# Patient Record
Sex: MALE | NOT HISPANIC OR LATINO | Employment: FULL TIME | URBAN - METROPOLITAN AREA
[De-identification: names, ages, dates, MRNs, and addresses within clinical notes are randomized per-mention and may not be internally consistent; named-entity substitution may affect disease eponyms.]

---

## 2018-01-15 NOTE — PROGRESS NOTES
Assessment    1  Encounter for preventive health examination (V70 0) (Z00 00)   2  Nocturia (788 43) (R35 1)   3  Urinary hesitancy (788 64) (R39 11)   4  Tinea cruris (110 3) (B35 6)    Plan  Health Maintenance    · Always use a seat belt and shoulder strap when riding or driving a motor vehicle ;  Status:Complete;   Done: [id number]   · Begin or continue regular aerobic exercise  Gradually work up to at least 3 sessions of 30  minutes of exercise a week ; Status:Complete;   Done: 59OBJ0734   · Brush your teeth freq1 and floss at least once a day ; Status:Complete;   Done:  [id number]   · Decreasing the stress in your life may help your condition improve ; Status:Complete;    Done: 07WHI8410   · Diets that are low in carbohydrates and high in protein are very popular for weight loss ;  Status:Complete;   Done: 81HFS0177   · Eat a low fat and low cholesterol diet ; Status:Complete;   Done: 67MGK9188   · Use a sun block product with an SPF of 15 or more ; Status:Complete;   Done:  32DIU4137   · Vitamins can help you get daily requirements that your diet may not be giving you ;  Status:Complete;   Done: 01PCR0967   · We recommend routine visits to a dentist ; Status:Complete;   Done: 57EXF8221   · Call [phone] if: You have any warning signs of skin cancer ; Status:Complete;    Done: 41HVO8920   · Call 911 if: You experience a new kind of chest pain (angina) or pressure ;  Status:Complete;   Done: 13IDJ5119   · Stop: Fluzone Intramuscular Suspension  Health Maintenance, Need for lipid screening    · (1) LIPID PANEL, FASTING; Status:Active; Requested for:31Mar2016; Health Maintenance, Screening for diabetes mellitus    · (1) COMPREHENSIVE METABOLIC PANEL; Status:Active; Requested for:31Mar2016; Health Maintenance, Screening for thyroid disorder    · (1) TSH; Status:Active;  Requested for:31Mar2016;   Nocturia, Urinary hesitancy    · *1 - [facility] Physician Referral  Consult  Status: Active Requested  for: [id number]  Care Summary provided  : Yes   · (1) PSA (SCREEN) (Dx V76 44 Screen for Prostate Cancer); Status:Active; Requested  for:31Mar2016;   Screening for thyroid disorder, Tinea cruris    · Clotrimazole 1 % External Cream; apply small amount to effected area BID for 10  days    Discussion/Summary  Impression: health maintenance visit  Currently, he eats a healthy diet and has an adequate exercise regimen  Prostate cancer screening: PSA was ordered  Colorectal cancer screening: the risks and benefits of colorectal cancer screening were discussed and colorectal cancer screening is current  Screening lab work includes glucose and lipid profile  The immunizations are up to date  He was advised to be evaluated by an optometrist and a dentist  Advice and education were given regarding nutrition, aerobic exercise, calcium supplements, vitamin D supplements, alcohol use, sunscreen use, self skin examination and seat belt use  Patient discussion: discussed with the patient  Chief Complaint  PHYSICAL      History of Present Illness  HM, Adult Male: The patient is being seen for a health maintenance evaluation  The last health maintenance visit was 2 year(s) ago  Social History: Household members include spouse  He is   Work status: working full time and occupation: [employer]  The patient has never smoked cigarettes  He reports never drinking alcohol  He has never used illicit drugs  General Health: The patient's health since the last visit is described as good  He has regular dental visits  Immunizations status: up to date  Lifestyle:  He consumes a diverse and healthy diet  He does not have any weight concerns  He exercises regularly  He does not use tobacco  He denies alcohol use  He denies drug use  Reproductive health:  the patient is sexually active  He denies erectile dysfunction  Screening: Prostate cancer screening includes no previous prostate-specific antigen testing   Testicular cancer screening includes no previous evaluation  Colorectal cancer screening includes a colonoscopy within the past ten years  Metabolic screening includes lipid profile performed 2014, glucose screening performed 2014, thyroid function test performed 2014 and no previous DEXA  Cardiovascular risk factors: family history of cardiovascular disease, but no hypertension, no diabetes, no high LDL cholesterol, no low HDL cholesterol, no stress, no obesity, no tobacco use, no illicit drug use and no sedentary lifestyle  General health risks: no elevated prostate-specific antigen, no undescended testis, no family history of prostate cancer, no previous colon polyps, no inflammatory bowel disease, no osteoporosis risk factors, no asbestos exposure, no radiation exposure and no occupational exposure to  Safety elements used: seat belt, safe driving habits, sunscreen, smoke detector and carbon monoxide detector  HPI: Patient is here for routine checkup Patient only concerns are a change in urination where he gets up 2 times per night and has a slightly decreased stream Pateint is also having a rash in his groin that is itchy and red Patient has no other issues Patient is up to date with colonoscopy      Review of Systems    Constitutional: no fever, not feeling poorly, no chills and not feeling tired  Eyes: no eye pain and no eyesight problems  ENT: no complaints of earache, no hearing loss, no nosebleeds, no nasal discharge, no sore throat, no hoarseness  Cardiovascular: no chest pain, no intermittent leg claudication, no palpitations and no extremity edema  Respiratory: No complaints of shortness of breath, no wheezing, no cough, no SOB on exertion, no orthopnea or PND  Gastrointestinal: No complaints of abdominal pain, no constipation, no nausea or vomiting, no diarrhea or bloody stools     Genitourinary: nocturia, but no dysuria and no incontinence    The patient presents with complaints of gradual onset of intermittent episodes of mild urinary hesitancy  Episodes started about 1 year ago  He is currently experiencing urinary hesitancy  Symptoms are improved by increased fluids, but not by medication, medication avoidance, urethral dilation, stress reduction and adequate rest  Symptoms are not made worse by decongestants, antihistamines, sedative/hypnotics, anti-parkinson medications, constipation and caffeine use  Symptoms are worsening  Risk Factors: no urethral stricture / stenosis, no recurrent UTIs, no STDs, no renal calculi, no pelvic fracture, no spinal cord injury and no previous bladder surgery  Pertinent Medical History: no stress urinary incontinence, no urge urinary incontinence, no renal insufficiency, no diabetes and no parkinson disease  Musculoskeletal: no arthralgias and no myalgias  Integumentary: no rashes  Neurological: No compliants of headache, no confusion, no convulsions, no numbness or tingling, no dizziness or fainting, no limb weakness, no difficulty walking  Psychiatric: no anxiety, no sleep disturbances and no depression  Active Problems    1  Colonoscopy (Fiberoptic) Screening   2  Need for influenza vaccination (V04 81) (Z23)    Past Medical History    · History of hyperglycemia (V12 29) (Z86 39)   · History of Right knee pain (719 46) (M25 561)    Surgical History    · History of Complete Colonoscopy    Family History    · Family history of dementia (V17 2) (Z81 8)    · Family history of Stroke Syndrome (V17 1)    · Family history of No Significant Family History   · Family history of No Significant Family History    · Family history of No Significant Family History   · Family history of No Significant Family History   · Family history of No Significant Family History   · Family history of No Significant Family History    Social History    · Being A Social Drinker   · Never A Smoker    Allergies    1   No Known Drug Allergies    Vitals   Recorded: 50PAQ6621 12:51PM Systolic 828   Diastolic 78   Height 5 ft 8 in   Weight 191 lb 8 oz   BMI Calculated 29 12   BSA Calculated 2 01     Physical Exam    Constitutional   General appearance: No acute distress, well appearing and well nourished  Eyes   Conjunctiva and lids: No erythema, swelling or discharge  Pupils and irises: Equal, round, reactive to light  Ears, Nose, Mouth, and Throat   External inspection of ears and nose: Normal     Otoscopic examination: Tympanic membranes translucent with normal light reflex  Canals patent without erythema  Hearing: Normal     Nasal mucosa, septum, and turbinates: Normal without edema or erythema  Lips, teeth, and gums: Normal, good dentition  Oropharynx: Normal with no erythema, edema, exudate or lesions  Neck   Neck: Supple, symmetric, trachea midline, no masses  Thyroid: Normal, no thyromegaly  Pulmonary   Respiratory effort: No increased work of breathing or signs of respiratory distress  Auscultation of lungs: Clear to auscultation  Cardiovascular   Palpation of heart: Normal PMI, no thrills  Auscultation of heart: Normal rate and rhythm, normal S1 and S2, no murmurs  Carotid pulses: 2+ bilaterally  Femoral pulses: 2+ bilaterally  Pedal pulses: 2+ bilaterally  Examination of extremities for edema and/or varicosities: Normal     Abdomen   Abdomen: Non-tender, no masses  Liver and spleen: No hepatomegaly or splenomegaly  Lymphatic   Palpation of lymph nodes in neck: No lymphadenopathy  Musculoskeletal   Gait and station: Normal     Muscle strength/tone: Normal     Skin   Skin and subcutaneous tissue: Abnormal   erythematous rash in the groin that has satellite lesions  Palpation of skin and subcutaneous tissue: Normal turgor  Neurologic   Cranial nerves: Cranial nerves 2-12 intact  Sensation: No sensory loss      Psychiatric   Judgment and insight: Normal     Orientation to person, place and time: Normal     Recent and remote memory: Intact      Mood and affect: Normal        Signatures   Electronically signed by : Michael Araya DO; Mar 31 2016  1:38PM EST                       (Author)

## 2018-01-16 NOTE — RESULT NOTES
Verified Results  (1) PSA (SCREEN) (Dx V76 44 Screen for Prostate Cancer) 02Apr2016 09:33AM Viraj Morgan   REPORT COMMENT:  FASTING:YES     Test Name Result Flag Reference   PSA, TOTAL 3 0 ng/mL  < OR = 4 0   This test was performed using the Siemens  chemiluminescent method  Values obtained from  different assay methods cannot be used  interchangeably  PSA levels, regardless of  value, should not be interpreted as absolute  evidence of the presence or absence of disease  (1) COMPREHENSIVE METABOLIC PANEL [id number] 67:07MK Viraj Morgan     Test Name Result Flag Reference   GLUCOSE 100 mg/dL H 65-99   Fasting reference interval   UREA NITROGEN (BUN) 16 mg/dL  7-25   CREATININE 1 21 mg/dL  0 70-1 33   For patients >52years of age, the reference limit  for Creatinine is approximately 13% higher for people  identified as -American  eGFR NON-AFR  AMERICAN 65 mL/min/1 73m2  > OR = 60   eGFR AFRICAN AMERICAN 75 mL/min/1 73m2  > OR = 60   BUN/CREATININE RATIO   7-10   NOT APPLICABLE (calc)   SODIUM 138 mmol/L  135-146   POTASSIUM 4 5 mmol/L  3 5-5 3   CHLORIDE 107 mmol/L     CARBON DIOXIDE 21 mmol/L  19-30   CALCIUM 8 8 mg/dL  8 6-10 3   PROTEIN, TOTAL 6 9 g/dL  6 1-8 1   ALBUMIN 3 8 g/dL  3 6-5 1   GLOBULIN 3 1 g/dL (calc)  1 9-3 7   ALBUMIN/GLOBULIN RATIO 1 2 (calc)  1 0-2 5   BILIRUBIN, TOTAL 0 6 mg/dL  0 2-1 2   ALKALINE PHOSPHATASE 51 U/L     AST 17 U/L  10-35   ALT 14 U/L  9-46     (1) LIPID PANEL, FASTING 02Apr2016 09:33AM Viraj Morgan     Test Name Result Flag Reference   CHOLESTEROL, TOTAL 173 mg/dL  125-200   HDL CHOLESTEROL 43 mg/dL  > OR = 40   TRIGLICERIDES 95 mg/dL  <615   LDL-CHOLESTEROL 111 mg/dL (calc)  <130   Desirable range <100 mg/dL for patients with CHD or  diabetes and <70 mg/dL for diabetic patients with  known heart disease     CHOL/HDLC RATIO 4 0 (calc)  < OR = 5 0   NON HDL CHOLESTEROL 130 mg/dL (calc)     Target for non-HDL cholesterol is 30 mg/dL higher than   LDL cholesterol target       (Q) TSH, 3RD GENERATION 02Apr2016 09:33AM Kofi Suazo     Test Name Result Flag Reference   TSH 1 18 mIU/L  0 40-4 50

## 2019-09-23 ENCOUNTER — OFFICE VISIT (OUTPATIENT)
Dept: FAMILY MEDICINE CLINIC | Facility: CLINIC | Age: 64
End: 2019-09-23
Payer: COMMERCIAL

## 2019-09-23 VITALS
SYSTOLIC BLOOD PRESSURE: 150 MMHG | DIASTOLIC BLOOD PRESSURE: 70 MMHG | WEIGHT: 191 LBS | BODY MASS INDEX: 30.7 KG/M2 | HEIGHT: 66 IN

## 2019-09-23 DIAGNOSIS — R35.1 NOCTURIA: ICD-10-CM

## 2019-09-23 DIAGNOSIS — E66.9 OBESITY (BMI 30.0-34.9): ICD-10-CM

## 2019-09-23 DIAGNOSIS — Z13.1 SCREENING FOR DIABETES MELLITUS: ICD-10-CM

## 2019-09-23 DIAGNOSIS — R03.0 ELEVATED BLOOD PRESSURE READING: ICD-10-CM

## 2019-09-23 DIAGNOSIS — Z13.220 SCREENING, LIPID: ICD-10-CM

## 2019-09-23 DIAGNOSIS — G47.8 UNREFRESHED BY SLEEP: ICD-10-CM

## 2019-09-23 DIAGNOSIS — Z00.00 ANNUAL PHYSICAL EXAM: Primary | ICD-10-CM

## 2019-09-23 DIAGNOSIS — Z12.5 SCREENING FOR PROSTATE CANCER: ICD-10-CM

## 2019-09-23 DIAGNOSIS — R73.01 FASTING HYPERGLYCEMIA: ICD-10-CM

## 2019-09-23 PROBLEM — E66.811 OBESITY (BMI 30.0-34.9): Status: ACTIVE | Noted: 2019-09-23

## 2019-09-23 PROCEDURE — 99396 PREV VISIT EST AGE 40-64: CPT | Performed by: FAMILY MEDICINE

## 2019-09-23 NOTE — PATIENT INSTRUCTIONS
Wellness Visit for Adults   AMBULATORY CARE:   A wellness visit  is when you see your healthcare provider to get screened for health problems  You can also get advice on how to stay healthy  Write down your questions so you remember to ask them  Ask your healthcare provider how often you should have a wellness visit  What happens at a wellness visit:  Your healthcare provider will ask about your health, and your family history of health problems  This includes high blood pressure, heart disease, and cancer  He or she will ask if you have symptoms that concern you, if you smoke, and about your mood  You may also be asked about your intake of medicines, supplements, food, and alcohol  Any of the following may be done:  · Your weight  will be checked  Your height may also be checked so your body mass index (BMI) can be calculated  Your BMI shows if you are at a healthy weight  · Your blood pressure  and heart rate will be checked  Your temperature may also be checked  · Blood and urine tests  may be done  Blood tests may be done to check your cholesterol levels  Abnormal cholesterol levels increase your risk for heart disease and stroke  You may also need a blood or urine test to check for diabetes if you are at increased risk  Urine tests may be done to look for signs of an infection or kidney disease  · A physical exam  includes checking your heartbeat and lungs with a stethoscope  Your healthcare provider may also check your skin to look for sun damage  · Screening tests  may be recommended  A screening test is done to check for diseases that may not cause symptoms  The screening tests you may need depend on your age, gender, family history, and lifestyle habits  For example, colorectal screening may be recommended if you are 48years old or older  Screening tests you need if you are a woman:   · A Pap smear  is used to screen for cervical cancer   Pap smears are usually done every 3 to 5 years depending on your age  You may need them more often if you have had abnormal Pap smear test results in the past  Ask your healthcare provider how often you should have a Pap smear  · A mammogram  is an x-ray of your breasts to screen for breast cancer  Experts recommend mammograms every 2 years starting at age 48 years  You may need a mammogram at age 52 years or younger if you have an increased risk for breast cancer  Talk to your healthcare provider about when you should start having mammograms and how often you need them  Vaccines you may need:   · Get an influenza vaccine  every year  The influenza vaccine protects you from the flu  Several types of viruses cause the flu  The viruses change over time, so new vaccines are made each year  · Get a tetanus-diphtheria (Td) booster vaccine  every 10 years  This vaccine protects you against tetanus and diphtheria  Tetanus is a severe infection that may cause painful muscle spasms and lockjaw  Diphtheria is a severe bacterial infection that causes a thick covering in the back of your mouth and throat  · Get a human papillomavirus (HPV) vaccine  if you are female and aged 23 to 32 or male 23 to 24 and never received it  This vaccine protects you from HPV infection  HPV is the most common infection spread by sexual contact  HPV may also cause vaginal, penile, and anal cancers  · Get a pneumococcal vaccine  if you are aged 72 years or older  The pneumococcal vaccine is an injection given to protect you from pneumococcal disease  Pneumococcal disease is an infection caused by pneumococcal bacteria  The infection may cause pneumonia, meningitis, or an ear infection  · Get a shingles vaccine  if you are aged 61 or older, even if you have had shingles before  The shingles vaccine is an injection to protect you from the varicella-zoster virus  This is the same virus that causes chickenpox   Shingles is a painful rash that develops in people who had chickenpox or have been exposed to the virus  How to eat healthy:  My Plate is a model for planning healthy meals  It shows the types and amounts of foods that should go on your plate  Fruits and vegetables make up about half of your plate, and grains and protein make up the other half  A serving of dairy is included on the side of your plate  The amount of calories and serving sizes you need depends on your age, gender, weight, and height  Examples of healthy foods are listed below:  · Eat a variety of vegetables  such as dark green, red, and orange vegetables  You can also include canned vegetables low in sodium (salt) and frozen vegetables without added butter or sauces  · Eat a variety of fresh fruits , canned fruit in 100% juice, frozen fruit, and dried fruit  · Include whole grains  At least half of the grains you eat should be whole grains  Examples include whole-wheat bread, wheat pasta, brown rice, and whole-grain cereals such as oatmeal     · Eat a variety of protein foods such as seafood (fish and shellfish), lean meat, and poultry without skin (turkey and chicken)  Examples of lean meats include pork leg, shoulder, or tenderloin, and beef round, sirloin, tenderloin, and extra lean ground beef  Other protein foods include eggs and egg substitutes, beans, peas, soy products, nuts, and seeds  · Choose low-fat dairy products such as skim or 1% milk or low-fat yogurt, cheese, and cottage cheese  · Limit unhealthy fats  such as butter, hard margarine, and shortening  Exercise:  Exercise at least 30 minutes per day on most days of the week  Some examples of exercise include walking, biking, dancing, and swimming  You can also fit in more physical activity by taking the stairs instead of the elevator or parking farther away from stores  Include muscle strengthening activities 2 days each week  Regular exercise provides many health benefits   It helps you manage your weight, and decreases your risk for type 2 diabetes, heart disease, stroke, and high blood pressure  Exercise can also help improve your mood  Ask your healthcare provider about the best exercise plan for you  General health and safety guidelines:   · Do not smoke  Nicotine and other chemicals in cigarettes and cigars can cause lung damage  Ask your healthcare provider for information if you currently smoke and need help to quit  E-cigarettes or smokeless tobacco still contain nicotine  Talk to your healthcare provider before you use these products  · Limit alcohol  A drink of alcohol is 12 ounces of beer, 5 ounces of wine, or 1½ ounces of liquor  · Lose weight, if needed  Being overweight increases your risk of certain health conditions  These include heart disease, high blood pressure, type 2 diabetes, and certain types of cancer  · Protect your skin  Do not sunbathe or use tanning beds  Use sunscreen with a SPF 15 or higher  Apply sunscreen at least 15 minutes before you go outside  Reapply sunscreen every 2 hours  Wear protective clothing, hats, and sunglasses when you are outside  · Drive safely  Always wear your seatbelt  Make sure everyone in your car wears a seatbelt  A seatbelt can save your life if you are in an accident  Do not use your cell phone when you are driving  This could distract you and cause an accident  Pull over if you need to make a call or send a text message  · Practice safe sex  Use latex condoms if are sexually active and have more than one partner  Your healthcare provider may recommend screening tests for sexually transmitted infections (STIs)  · Wear helmets, lifejackets, and protective gear  Always wear a helmet when you ride a bike or motorcycle, go skiing, or play sports that could cause a head injury  Wear protective equipment when you play sports  Wear a lifejacket when you are on a boat or doing water sports    © 2017 [address] [employer] Information is for End User's use only and may not be sold, redistributed or otherwise used for commercial purposes  All illustrations and images included in CareNotes® are the copyrighted property of [employer]  or Osiel Tomlinson  The above information is an  only  It is not intended as medical advice for individual conditions or treatments  Talk to your doctor, nurse or pharmacist before following any medical regimen to see if it is safe and effective for you  Obesity   AMBULATORY CARE:   Obesity  is when your body mass index (BMI) is greater than 30  Your healthcare provider will use your height and weight to measure your BMI  The risks of obesity include  many health problems, such as injuries or physical disability  You may need tests to check for the following:  · Diabetes     · High blood pressure or high cholesterol     · Heart disease     · Gallbladder or liver disease     · Cancer of the colon, breast, prostate, liver, or kidney     · Sleep apnea     · Arthritis or gout  Seek care immediately if:   · You have a severe headache, confusion, or difficulty speaking  · You have weakness on one side of your body  · You have chest pain, sweating, or shortness of breath  Contact your healthcare provider if:   · You have symptoms of gallbladder or liver disease, such as pain in your upper abdomen  · You have knee or hip pain and discomfort while walking  · You have symptoms of diabetes, such as intense hunger and thirst, and frequent urination  · You have symptoms of sleep apnea, such as snoring or daytime sleepiness  · You have questions or concerns about your condition or care  Treatment for obesity  focuses on helping you lose weight to improve your health  Even a small decrease in BMI can reduce the risk for many health problems  Your healthcare provider will help you set a weight-loss goal   · Lifestyle changes  are the first step in treating obesity   These include making healthy food choices and getting regular physical activity  Your healthcare provider may suggest a weight-loss program that involves coaching, education, and therapy  · Medicine  may help you lose weight when it is used with a healthy diet and physical activity  · Surgery  can help you lose weight if you are very obese and have other health problems  There are several types of weight-loss surgery  Ask your healthcare provider for more information  Be successful losing weight:   · Set small, realistic goals  An example of a small goal is to walk for 20 minutes 5 days a week  Anther goal is to lose 5% of your body weight  · Tell friends, family members, and coworkers about your goals  and ask for their support  Ask a friend to lose weight with you, or join a weight-loss support group  · Identify foods or triggers that may cause you to overeat , and find ways to avoid them  Remove tempting high-calorie foods from your home and workplace  Place a bowl of fresh fruit on your kitchen counter  If stress causes you to eat, then find other ways to cope with stress  · Keep a diary to track what you eat and drink  Also write down how many minutes of physical activity you do each day  Weigh yourself once a week and record it in your diary  Eating changes: You will need to eat 500 to 1,000 fewer calories each day than you currently eat to lose 1 to 2 pounds a week  The following changes will help you cut calories:  · Eat smaller portions  Use small plates, no larger than 9 inches in diameter  Fill your plate half full of fruits and vegetables  Measure your food using measuring cups until you know what a serving size looks like  · Eat 3 meals and 1 or 2 snacks each day  Plan your meals in advance  Yomaira More and eat at home most of the time  Eat slowly  · Eat fruits and vegetables at every meal   They are low in calories and high in fiber, which makes you feel full   Do not add butter, margarine, or cream sauce to vegetables  Use herbs to season steamed vegetables  · Eat less fat and fewer fried foods  Eat more baked or grilled chicken and fish  These protein sources are lower in calories and fat than red meat  Limit fast food  Dress your salads with olive oil and vinegar instead of bottled dressing  · Limit the amount of sugar you eat  Do not drink sugary beverages  Limit alcohol  Activity changes:  Physical activity is good for your body in many ways  It helps you burn calories and build strong muscles  It decreases stress and depression, and improves your mood  It can also help you sleep better  Talk to your healthcare provider before you begin an exercise program   · Exercise for at least 30 minutes 5 days a week  Start slowly  Set aside time each day for physical activity that you enjoy and that is convenient for you  It is best to do both weight training and an activity that increases your heart rate, such as walking, bicycling, or swimming  · Find ways to be more active  Do yard work and housecleaning  Walk up the stairs instead of using elevators  Spend your leisure time going to events that require walking, such as outdoor festivals or fairs  This extra physical activity can help you lose weight and keep it off  Follow up with your healthcare provider as directed: You may need to meet with a dietitian  Write down your questions so you remember to ask them during your visits  © 2017 [address] [employer] Information is for End User's use only and may not be sold, redistributed or otherwise used for commercial purposes  All illustrations and images included in CareNotes® are the copyrighted property of [employer]  or Osiel Tomlinson  The above information is an  only  It is not intended as medical advice for individual conditions or treatments   Talk to your doctor, nurse or pharmacist before following any medical regimen to see if it is safe and effective for you   Heart Healthy Diet   AMBULATORY CARE:   A heart healthy diet  is an eating plan low in total fat, unhealthy fats, and sodium (salt)  A heart healthy diet helps decrease your risk for heart disease and stroke  Limit the amount of fat you eat to 25% to 35% of your total daily calories  Limit sodium to less than 2,300 mg each day  Healthy fats:  Healthy fats can help improve cholesterol levels  The risk for heart disease is decreased when cholesterol levels are normal  Choose healthy fats, such as the following:  · Unsaturated fat  is found in foods such as soybean, canola, olive, corn, and safflower oils  It is also found in soft tub margarine that is made with liquid vegetable oil  · Omega-3 fat  is found in certain fish, such as salmon, tuna, and trout, and in walnuts and flaxseed  Unhealthy fats:  Unhealthy fats can cause unhealthy cholesterol levels in your blood and increase your risk of heart disease  Limit unhealthy fats, such as the following:  · Cholesterol  is found in animal foods, such as eggs and lobster, and in dairy products made from whole milk  Limit cholesterol to less than 300 milligrams (mg) each day  You may need to limit cholesterol to 200 mg each day if you have heart disease  · Saturated fat  is found in meats, such as henderson and hamburger  It is also found in chicken or turkey skin, whole milk, and butter  Limit saturated fat to less than 7% of your total daily calories  Limit saturated fat to less than 6% if you have heart disease or are at increased risk for it  · Trans fat  is found in packaged foods, such as potato chips and cookies  It is also in hard margarine, some fried foods, and shortening  Avoid trans fats as much as possible    Heart healthy foods and drinks to include:  Ask your dietitian or healthcare provider how many servings to have from each of the following food groups:  · Grains:      ¨ Whole-wheat breads, cereals, and pastas, and brown rice    ¨ Low-fat, low-sodium crackers and chips    · Vegetables:      ¨ Broccoli, green beans, green peas, and spinach    ¨ Collards, kale, and lima beans    ¨ Carrots, sweet potatoes, tomatoes, and peppers    ¨ Canned vegetables with no salt added    · Fruits:      ¨ Bananas, peaches, pears, and pineapple    ¨ Grapes, raisins, and dates    ¨ Oranges, tangerines, grapefruit, orange juice, and grapefruit juice    ¨ Apricots, mangoes, melons, and papaya    ¨ Raspberries and strawberries    ¨ Canned fruit with no added sugar    · Low-fat dairy products:      ¨ Nonfat (skim) milk, 1% milk, and low-fat almond, cashew, or soy milks fortified with calcium    ¨ Low-fat cheese, regular or frozen yogurt, and cottage cheese    · Meats and proteins , such as lean cuts of beef and pork (loin, leg, round), skinless chicken and turkey, legumes, soy products, egg whites, and nuts  Foods and drinks to limit or avoid:  Ask your dietitian or healthcare provider about these and other foods that are high in unhealthy fat, sodium, and sugar:  · Snack or packaged foods , such as frozen dinners, cookies, macaroni and cheese, and cereals with more than 300 mg of sodium per serving    · Canned or dry mixes  for cakes, soups, sauces, or gravies    · Vegetables with added sodium , such as instant potatoes, vegetables with added sauces, or regular canned vegetables    · Other foods high in sodium , such as ketchup, barbecue sauce, salad dressing, pickles, olives, soy sauce, and miso    · High-fat dairy foods  such as whole or 2% milk, cream cheese, or sour cream, and cheeses     · High-fat protein foods  such as high-fat cuts of beef (T-bone steaks, ribs), chicken or turkey with skin, and organ meats, such as liver    · Cured or smoked meats , such as hot dogs, henderson, and sausage    · Unhealthy fats and oils , such as butter, stick margarine, shortening, and cooking oils such as coconut or palm oil    · Food and drinks high in sugar , such as soft drinks (soda), sports drinks, sweetened tea, candy, cake, cookies, pies, and doughnuts  Other diet guidelines to follow:   · Eat more foods containing omega-3 fats  Eat fish high in omega-3 fats at least 2 times a week  · Limit alcohol  Too much alcohol can damage your heart and raise your blood pressure  Women should limit alcohol to 1 drink a day  Men should limit alcohol to 2 drinks a day  A drink of alcohol is 12 ounces of beer, 5 ounces of wine, or 1½ ounces of liquor  · Choose low-sodium foods  High-sodium foods can lead to high blood pressure  Add little or no salt to food you prepare  Use herbs and spices in place of salt  · Eat more fiber  to help lower cholesterol levels  Eat at least 5 servings of fruits and vegetables each day  Eat 3 ounces of whole-grain foods each day  Legumes (beans) are also a good source of fiber  Lifestyle guidelines:   · Do not smoke  Nicotine and other chemicals in cigarettes and cigars can cause lung and heart damage  Ask your healthcare provider for information if you currently smoke and need help to quit  E-cigarettes or smokeless tobacco still contain nicotine  Talk to your healthcare provider before you use these products  · Exercise regularly  to help you maintain a healthy weight and improve your blood pressure and cholesterol levels  Ask your healthcare provider about the best exercise plan for you  Do not start an exercise program without asking your healthcare provider  Follow up with your healthcare provider as directed:  Write down your questions so you remember to ask them during your visits  © 2017 [address] [employer] Information is for End User's use only and may not be sold, redistributed or otherwise used for commercial purposes  All illustrations and images included in CareNotes® are the copyrighted property of [employer]  or Osiel Tomlinson  The above information is an  only   It is not intended as medical advice for individual conditions or treatments  Talk to your doctor, nurse or pharmacist before following any medical regimen to see if it is safe and effective for you

## 2019-09-23 NOTE — PROGRESS NOTES
[address]    NAME: Luisa Pichardo  AGE: 62 y o  SEX: male  : 1955     DATE: 2019     Assessment and Plan:     Problem List Items Addressed This Visit        Nervous and Auditory    Unrefreshed by sleep     With the elevated BP obesity and fatigue I will check sleep study         Relevant Orders    Home Study       Other    Elevated blood pressure reading     dsicussed hear health diet no caffeine and will recheck in 6 weeks         Relevant Orders    Home Study    Nocturia     Decrease cafeeine refer to urology check PSA         Relevant Orders    Ambulatory referral to Urology    Fasting hyperglycemia     Last fasting sugar was borderline and with weight in obesity category will check A1c         Relevant Orders    Hemoglobin A1C    Screening for prostate cancer     Check PSA refer to urology         Relevant Orders    PSA, Total Screen    Ambulatory referral to Urology    Obesity (BMI 30 0-34 9)     disucssed diet and exercise at length with patient          Relevant Orders    Lipid panel    Comprehensive metabolic panel    Hemoglobin A1C    Home Study    Annual physical exam - Primary     Patient to get me copy of immunizations from his job He gets tetanus and flu there and knows he is up to date Discussed screening for prostate cancer We also discussed weight loss and exercise            Other Visit Diagnoses     Screening for diabetes mellitus        Relevant Orders    Comprehensive metabolic panel    Screening, lipid        Relevant Orders    Lipid panel          Immunizations and preventive care screenings were discussed with patient today  Appropriate education was printed on patient's after visit summary  Counseling:  Alcohol/drug use: discussed moderation in alcohol intake, the recommendations for healthy alcohol use, and avoidance of illicit drug use    Dental Health: discussed importance of regular tooth brushing, flossing, and dental visits  Sexual health: discussed sexually transmitted diseases, partner selection, use of condoms, avoidance of unintended pregnancy, and contraceptive alternatives  · Exercise: the importance of regular exercise/physical activity was discussed  Recommend exercise 3-5 times per week for at least 30 minutes  BMI Counseling: Body mass index is 30 83 kg/m²  The BMI is above normal  Nutrition recommendations include decreasing portion sizes and increasing intake of lean protein  Exercise recommendations include exercising 3-5 times per week  Return in 6 weeks (on 11/4/2019) for Recheck blood pressure  Chief Complaint:     Chief Complaint   Patient presents with    Physical Exam      History of Present Illness:     Adult Annual Physical   Patient here for a comprehensive physical exam  The patient reports no problems  Diet and Physical Activity  · Diet/Nutrition: well balanced diet  · Exercise: 3-4 times a week on average  Depression Screening  PHQ-9 Depression Screening    PHQ-9:    Frequency of the following problems over the past two weeks:            General Health  · Sleep: gets 7-8 hours of sleep on average  · Hearing: normal - bilateral   · Vision: most recent eye exam <1 year ago and wears glasses  · Dental: regular dental visits, brushes teeth three times daily and flosses teeth occasionally   Health  · Symptoms include: nocturia     Review of Systems:     Review of Systems   Constitutional: Positive for fatigue  Negative for fever and unexpected weight change  HENT: Negative for congestion, sinus pain and trouble swallowing  Eyes: Negative for discharge and visual disturbance  Respiratory: Negative for cough, chest tightness, shortness of breath and wheezing  Cardiovascular: Negative for chest pain, palpitations and leg swelling  Gastrointestinal: Negative for abdominal pain, blood in stool, constipation, diarrhea, nausea and vomiting  Genitourinary: Negative for difficulty urinating, dysuria, frequency and hematuria  Nocutira 2-3 times per night   Musculoskeletal: Negative for arthralgias, gait problem and joint swelling  Skin: Negative for rash and wound  Allergic/Immunologic: Negative for environmental allergies and food allergies  Neurological: Negative for dizziness, syncope, weakness, numbness and headaches  Hematological: Negative for adenopathy  Does not bruise/bleed easily  Psychiatric/Behavioral: Positive for sleep disturbance  Negative for confusion and decreased concentration  The patient is not nervous/anxious  Frequent wakenings restlessness unrefereshed by sleep Snores a lot per wife      Past Medical History:     Past Medical History:   Diagnosis Date    Hyperglycemia     Resolved 3/31/2016       Past Surgical History:     Past Surgical History:   Procedure Laterality Date    COLONOSCOPY      Proctitis, otherwise nml  Dr Kraft Cargo   Resolved 12/3/2008       Family History:     Family History   Problem Relation Age of Onset    Dementia Mother     Stroke Father     Hypertension Father     No Known Problems Sister     No Known Problems Daughter     No Known Problems Daughter     No Known Problems Sister     No Known Problems Sister     No Known Problems Sister       Social History:     Social History     Socioeconomic History    Marital status: /Civil Union     Spouse name: None    Number of children: None    Years of education: None    Highest education level: None   Occupational History    None   Social Needs    Financial resource strain: None    Food insecurity:     Worry: None     Inability: None    Transportation needs:     Medical: None     Non-medical: None   Tobacco Use    Smoking status: Never Smoker    Smokeless tobacco: Never Used   Substance and Sexual Activity    Alcohol use: Yes     Comment: Social     Drug use: Never    Sexual activity: Yes     Partners: Female Birth control/protection: Post-menopausal     Comment: 2 kids 35 and 32   Lifestyle    Physical activity:     Days per week: None     Minutes per session: None    Stress: None   Relationships    Social connections:     Talks on phone: None     Gets together: None     Attends [employer] service: None     Active member of club or organization: None     Attends meetings of clubs or organizations: None     Relationship status: None    Intimate partner violence:     Fear of current or ex partner: None     Emotionally abused: None     Physically abused: None     Forced sexual activity: None   Other Topics Concern    None   Social History Narrative    None      Current Medications:     No current outpatient medications on file  No current facility-administered medications for this visit  Allergies:     No Known Allergies   Physical Exam:     /70   Ht 5' 6" (1 676 m)   Wt 86 6 kg (191 lb)   BMI 30 83 kg/m²     Physical Exam   Constitutional: He is oriented to person, place, and time  He appears well-developed and well-nourished  HENT:   Head: Normocephalic and atraumatic  Right Ear: Hearing, tympanic membrane and external ear normal    Left Ear: Hearing, tympanic membrane and external ear normal    Eyes: Pupils are equal, round, and reactive to light  Conjunctivae and EOM are normal    Neck: Neck supple  No thyromegaly present  Cardiovascular: Normal rate and normal heart sounds  Pulmonary/Chest: Effort normal and breath sounds normal  He has no wheezes  He has no rales  Abdominal: Soft  Bowel sounds are normal  He exhibits no distension  There is no tenderness  Musculoskeletal: He exhibits no edema or tenderness  Lymphadenopathy:     He has no cervical adenopathy  Neurological: He is alert and oriented to person, place, and time  No cranial nerve deficit  Coordination normal    Skin: Skin is warm and dry  No rash noted  Psychiatric: He has a normal mood and affect   His behavior is normal  Judgment and thought content normal    Nursing note [place]  [id number] [address]

## 2019-09-23 NOTE — ASSESSMENT & PLAN NOTE
Patient to get me copy of immunizations from his job He gets tetanus and flu there and knows he is up to date Discussed screening for prostate cancer We also discussed weight loss and exercise

## 2019-09-23 NOTE — PROGRESS NOTES
Assessment/Plan:    No problem-specific Assessment & Plan notes found for this encounter  There are no diagnoses linked to this encounter  Subjective:   Chief Complaint   Patient presents with    Physical Exam        Patient ID: Carla Stovall is a 62 y o  male      HPI    The following portions of the patient's history were reviewed and updated as appropriate: allergies, current medications, past family history, past medical history, past social history, past surgical history and problem list     Review of Systems      Objective:      /70   Ht 5' 6" (1 676 m)   Wt 86 6 kg (191 lb)   BMI 30 83 kg/m²          Physical Exam

## 2019-10-01 LAB
ALBUMIN SERPL-MCNC: 4 G/DL (ref 3.6–5.1)
ALBUMIN/GLOB SERPL: 1.6 (CALC) (ref 1–2.5)
ALP SERPL-CCNC: 50 U/L (ref 40–115)
ALT SERPL-CCNC: 15 U/L (ref 9–46)
AST SERPL-CCNC: 16 U/L (ref 10–35)
BILIRUB SERPL-MCNC: 0.6 MG/DL (ref 0.2–1.2)
BUN SERPL-MCNC: 15 MG/DL (ref 7–25)
BUN/CREAT SERPL: 11 (CALC) (ref 6–22)
CALCIUM SERPL-MCNC: 8.8 MG/DL (ref 8.6–10.3)
CHLORIDE SERPL-SCNC: 107 MMOL/L (ref 98–110)
CHOLEST SERPL-MCNC: 155 MG/DL
CHOLEST/HDLC SERPL: 3.2 (CALC)
CO2 SERPL-SCNC: 26 MMOL/L (ref 20–32)
CREAT SERPL-MCNC: 1.33 MG/DL (ref 0.7–1.25)
GLOBULIN SER CALC-MCNC: 2.5 G/DL (CALC) (ref 1.9–3.7)
GLUCOSE SERPL-MCNC: 106 MG/DL (ref 65–99)
HBA1C MFR BLD: 5.6 % OF TOTAL HGB
HDLC SERPL-MCNC: 48 MG/DL
LDLC SERPL CALC-MCNC: 91 MG/DL (CALC)
NONHDLC SERPL-MCNC: 107 MG/DL (CALC)
POTASSIUM SERPL-SCNC: 4.2 MMOL/L (ref 3.5–5.3)
PROT SERPL-MCNC: 6.5 G/DL (ref 6.1–8.1)
PSA SERPL-MCNC: 4.5 NG/ML
SL AMB EGFR AFRICAN AMERICAN: 65 ML/MIN/1.73M2
SL AMB EGFR NON AFRICAN AMERICAN: 56 ML/MIN/1.73M2
SODIUM SERPL-SCNC: 140 MMOL/L (ref 135–146)
TRIGL SERPL-MCNC: 75 MG/DL

## 2019-10-15 ENCOUNTER — TELEPHONE (OUTPATIENT)
Dept: SLEEP CENTER | Facility: CLINIC | Age: 64
End: 2019-10-15

## 2019-10-15 NOTE — TELEPHONE ENCOUNTER
----- Message from Kendell Roberts DO sent at 10/14/2019  4:30 PM EDT -----   Chart reviewed  Study approved  Schedule HST   ----- Message -----  From: Rigoberto Daly MA  Sent: 10/14/2019   2:14 PM EDT  To: [facility] Provider    This sleep study needs approval      If approved please sign and return to clerical pool  If denied please include reasons why  Also provide alternative testing if warranted  Please sign and return to clerical pool

## 2019-10-25 ENCOUNTER — OFFICE VISIT (OUTPATIENT)
Dept: UROLOGY | Facility: MEDICAL CENTER | Age: 64
End: 2019-10-25
Payer: COMMERCIAL

## 2019-10-25 VITALS
HEART RATE: 59 BPM | DIASTOLIC BLOOD PRESSURE: 80 MMHG | SYSTOLIC BLOOD PRESSURE: 140 MMHG | HEIGHT: 66 IN | BODY MASS INDEX: 31.53 KG/M2 | WEIGHT: 196.2 LBS

## 2019-10-25 DIAGNOSIS — R35.1 NOCTURIA: ICD-10-CM

## 2019-10-25 DIAGNOSIS — N40.0 ENLARGED PROSTATE ON RECTAL EXAMINATION: ICD-10-CM

## 2019-10-25 DIAGNOSIS — R97.20 ELEVATED PSA: Primary | ICD-10-CM

## 2019-10-25 DIAGNOSIS — Z12.5 SCREENING FOR PROSTATE CANCER: ICD-10-CM

## 2019-10-25 LAB
SL AMB  POCT GLUCOSE, UA: NORMAL
SL AMB LEUKOCYTE ESTERASE,UA: NORMAL
SL AMB POCT BILIRUBIN,UA: NORMAL
SL AMB POCT BLOOD,UA: NORMAL
SL AMB POCT CLARITY,UA: CLEAR
SL AMB POCT COLOR,UA: YELLOW
SL AMB POCT KETONES,UA: NORMAL
SL AMB POCT NITRITE,UA: NORMAL
SL AMB POCT PH,UA: 6.5
SL AMB POCT SPECIFIC GRAVITY,UA: 1.02
SL AMB POCT URINE PROTEIN: NORMAL
SL AMB POCT UROBILINOGEN: 0.2

## 2019-10-25 PROCEDURE — 81003 URINALYSIS AUTO W/O SCOPE: CPT | Performed by: UROLOGY

## 2019-10-25 PROCEDURE — 99244 OFF/OP CNSLTJ NEW/EST MOD 40: CPT | Performed by: UROLOGY

## 2019-10-25 NOTE — PROGRESS NOTES
Assessment/Plan:      Diagnoses and all orders for this visit:    Elevated PSA  -     POCT urine dip auto non-scope  -     PSA, total and free; Future    Screening for prostate cancer  -     Ambulatory referral to Urology    Nocturia  -     Ambulatory referral to Urology    Enlarged prostate on rectal examination        Assessment/Plan:  Not really symptomatic enough for considering starting him on BPH meds at this point  Will repeat his PSA to ensure its truly elevated before suggesting intervention  Subjective:  Elevated PSA     Patient ID: Carlisle Siemens is a 62 y o  male  HPI  27-year-old gentleman now referred to see us for PSA elevation of 4 5, though he does not recall him having a level prior to this 1  He was seen a few years ago by our service for evaluation of urinary symptoms  He had complaints of nocturia one to 2 times per night as well as increasing urinary hesitancy  Interestingly enough, when he does take a Tylenol p m, that allows him to avoid getting up at night to void  That has not changed since then  Occasionally he feels the urge to double void  He does not have trouble emptying his bladder  He does notice a slight diminished stream       No history of kidney stones, urinary infections, family history of prostate problems  He does not take any other medications  Review of Systems   Constitutional: Negative  HENT: Negative  Eyes: Negative  Respiratory: Negative  Cardiovascular: Negative  Gastrointestinal: Negative  Endocrine: Negative  Genitourinary: Negative  Nocturia   Musculoskeletal: Negative  Skin: Negative  Allergic/Immunologic: Negative  Neurological: Negative  Hematological: Negative  Psychiatric/Behavioral: Negative  Objective:     Physical Exam   Constitutional: He is oriented to person, place, and time  He appears well-developed and well-nourished  No distress  HENT:   Head: Normocephalic and atraumatic     Nose: Nose normal    Mouth/Throat: Oropharynx is clear and moist    Eyes: Pupils are equal, round, and reactive to light  Conjunctivae and EOM are normal  No scleral icterus  Neck: Normal range of motion  Neck supple  Cardiovascular: Normal rate, regular rhythm, normal heart sounds and intact distal pulses  No murmur heard  Pulmonary/Chest: Effort normal and breath sounds normal  No respiratory distress  He has no wheezes  He has no rales  Abdominal: Soft  Bowel sounds are normal  He exhibits no distension and no mass  There is no tenderness  Musculoskeletal: Normal range of motion  He exhibits no edema or tenderness  Lymphadenopathy:     He has no cervical adenopathy  Neurological: He is alert and oriented to person, place, and time  No cranial nerve deficit  Skin: Skin is warm and dry  No rash noted  No erythema  No pallor  Psychiatric: He has a normal mood and affect  His behavior is normal  Judgment and thought content normal    Nursing note and vitals reviewed        PSA, Total Screen    Ref Range & Units 9/30/19  7:46 AM   Prostate Specific Antigen Total < OR = 4 0 ng/mL 4 5High

## 2019-11-13 ENCOUNTER — HOSPITAL ENCOUNTER (OUTPATIENT)
Dept: SLEEP CENTER | Facility: CLINIC | Age: 64
Discharge: HOME | End: 2019-11-13
Payer: COMMERCIAL

## 2019-11-13 DIAGNOSIS — G47.8 UNREFRESHED BY SLEEP: ICD-10-CM

## 2019-11-13 DIAGNOSIS — R03.0 ELEVATED BLOOD PRESSURE READING: ICD-10-CM

## 2019-11-13 DIAGNOSIS — E66.9 OBESITY (BMI 30.0-34.9): ICD-10-CM

## 2019-11-13 PROCEDURE — G0399 HOME SLEEP TEST/TYPE 3 PORTA: HCPCS

## 2019-11-14 NOTE — PROGRESS NOTES
Home Sleep Study Documentation    Pre-Sleep Home Study:    Set-up and instructions performed by: SANDEEP [facility]    Technician performed demonstration for Patient: yes    Return demonstration performed by Patient: yes    Written instructions provided to Patient: yes    Patient signed consent form: yes        Post-Sleep Home Study:    Additional comments by Patient: None    Home Sleep Study Failed:no:    Failure reason: N/A    Reported or Detected: N/A    Scored by: JOSÉ MIGUEL Cross

## 2019-11-15 DIAGNOSIS — G47.33 OBSTRUCTIVE SLEEP APNEA SYNDROME: Primary | ICD-10-CM

## 2019-11-15 DIAGNOSIS — G47.8 UNREFRESHED BY SLEEP: ICD-10-CM

## 2019-11-22 ENCOUNTER — OFFICE VISIT (OUTPATIENT)
Dept: UROLOGY | Facility: MEDICAL CENTER | Age: 64
End: 2019-11-22
Payer: COMMERCIAL

## 2019-11-22 VITALS
DIASTOLIC BLOOD PRESSURE: 88 MMHG | SYSTOLIC BLOOD PRESSURE: 158 MMHG | WEIGHT: 191 LBS | BODY MASS INDEX: 29.98 KG/M2 | HEIGHT: 67 IN | HEART RATE: 59 BPM

## 2019-11-22 DIAGNOSIS — IMO0001: Primary | ICD-10-CM

## 2019-11-22 DIAGNOSIS — R35.1 NOCTURIA: ICD-10-CM

## 2019-11-22 DIAGNOSIS — N40.0 ENLARGED PROSTATE ON RECTAL EXAMINATION: ICD-10-CM

## 2019-11-22 LAB
SL AMB  POCT GLUCOSE, UA: NORMAL
SL AMB LEUKOCYTE ESTERASE,UA: NORMAL
SL AMB POCT BILIRUBIN,UA: NORMAL
SL AMB POCT BLOOD,UA: NORMAL
SL AMB POCT CLARITY,UA: CLEAR
SL AMB POCT COLOR,UA: YELLOW
SL AMB POCT KETONES,UA: NORMAL
SL AMB POCT NITRITE,UA: NORMAL
SL AMB POCT PH,UA: 7
SL AMB POCT SPECIFIC GRAVITY,UA: 1.02
SL AMB POCT URINE PROTEIN: NORMAL
SL AMB POCT UROBILINOGEN: 0.2

## 2019-11-22 PROCEDURE — 81003 URINALYSIS AUTO W/O SCOPE: CPT | Performed by: UROLOGY

## 2019-11-22 PROCEDURE — 99213 OFFICE O/P EST LOW 20 MIN: CPT | Performed by: UROLOGY

## 2019-11-22 NOTE — PROGRESS NOTES
Assessment/Plan:      Diagnoses and all orders for this visit:    Normal prostate specific antigen (PSA) level  -     PSA, total and free; Future    Enlarged prostate on rectal examination  -     PSA, total and free; Future    Nocturia  -     POCT urine dip auto non-scope        Assessment/Plan:  Repeat PSA normalized  Follow-up and PSA in 6 months  Subjective:  No complaints     Patient ID: Sheeba Sykes is a 62 y o  male  HPI  29-year-old gentleman was initially referred to see us for PSA elevation of 4 5, though he did not recall him having a level prior  Leidy Richards He was seen a few years ago by our service for evaluation of urinary symptoms  He had complaints of nocturia one to 2 times per night as well as increasing urinary hesitancy  Interestingly enough, when he does take a Tylenol p m, that allows him to avoid getting up at night to void  That has not changed since then  Occasionally he feels the urge to double void  He does not have trouble emptying his bladder  He does notice a slight diminished stream      Repeat PSA earlier this month with 3 3      No history of kidney stones, urinary infections, family history of prostate problems  He does not take any other medications  Review of Systems   Constitutional: Negative  HENT: Negative  Eyes: Negative  Respiratory: Negative  Cardiovascular: Negative  Gastrointestinal: Negative  Endocrine: Negative  Genitourinary: Negative  Musculoskeletal: Negative  Skin: Negative  Allergic/Immunologic: Negative  Neurological: Negative  Hematological: Negative  Psychiatric/Behavioral: Negative  Objective:     Physical Exam   Constitutional: He is oriented to person, place, and time  He appears well-developed and well-nourished  No distress  HENT:   Head: Normocephalic and atraumatic  Nose: Nose normal    Mouth/Throat: Oropharynx is clear and moist    Eyes: Pupils are equal, round, and reactive to light   Conjunctivae and EOM are normal  No scleral icterus  Neck: Normal range of motion  Neck supple  Cardiovascular: Normal rate, regular rhythm, normal heart sounds and intact distal pulses  No murmur heard  Pulmonary/Chest: Effort normal and breath sounds normal  No respiratory distress  He has no wheezes  He has no rales  Abdominal: Soft  Bowel sounds are normal  He exhibits no distension and no mass  There is no tenderness  Musculoskeletal: Normal range of motion  He exhibits no edema or tenderness  Lymphadenopathy:     He has no cervical adenopathy  Neurological: He is alert and oriented to person, place, and time  No cranial nerve deficit  Skin: Skin is warm and dry  No rash noted  No erythema  No pallor  Psychiatric: He has a normal mood and affect  His behavior is normal  Judgment and thought content normal    Nursing note and vitals reviewed          PSA from 11/04/2019 was 3 3, with a% free of 24%

## 2019-11-26 ENCOUNTER — OFFICE VISIT (OUTPATIENT)
Dept: FAMILY MEDICINE CLINIC | Facility: CLINIC | Age: 64
End: 2019-11-26
Payer: COMMERCIAL

## 2019-11-26 VITALS
HEIGHT: 67 IN | BODY MASS INDEX: 30.61 KG/M2 | TEMPERATURE: 98.5 F | SYSTOLIC BLOOD PRESSURE: 180 MMHG | DIASTOLIC BLOOD PRESSURE: 100 MMHG | WEIGHT: 195 LBS

## 2019-11-26 DIAGNOSIS — I10 ESSENTIAL HYPERTENSION: Primary | ICD-10-CM

## 2019-11-26 DIAGNOSIS — E66.9 OBESITY (BMI 30.0-34.9): ICD-10-CM

## 2019-11-26 PROCEDURE — 1036F TOBACCO NON-USER: CPT | Performed by: FAMILY MEDICINE

## 2019-11-26 PROCEDURE — 99213 OFFICE O/P EST LOW 20 MIN: CPT | Performed by: FAMILY MEDICINE

## 2019-11-26 RX ORDER — LISINOPRIL 5 MG/1
5 TABLET ORAL DAILY
Qty: 30 TABLET | Refills: 1 | Status: SHIPPED | OUTPATIENT
Start: 2019-11-26 | End: 2020-03-26 | Stop reason: SDUPTHER

## 2019-11-26 NOTE — PROGRESS NOTES
Assessment/Plan:    Essential hypertension  Discussed low salt diet with patient as well as wiehgt loss and stress management With family hisotry and his home BP elevated I will start medication lisinopril 5 mg daily and will have nurse BP check in 2 weeks       Diagnoses and all orders for this visit:    Essential hypertension    Obesity (BMI 30 0-34  9)          Subjective:   Chief Complaint   Patient presents with    Follow-up    Hypertension          Patient ID: Holly [facility] is a 62 y o  male  Patient is here for folwloup of hypertension His blood pressure has been elevated for last 6 months he has had BP readings at home and are between 140-150 patient has no headahce chest pain or dizziness Patient has family history of dad at age 46 stroke due to hypertenison his last labs were good He feels stress is part of issue he is planning to retire in June [place] will then pay  More attention to diet and try to exercise      The following portions of the patient's history were reviewed and updated as appropriate: allergies, current medications, past medical history, past social history and problem list     Review of Systems   Constitutional: Negative for fatigue, fever and unexpected weight change  HENT: Negative for congestion, sinus pain and trouble swallowing  Eyes: Negative for discharge and visual disturbance  Respiratory: Negative for cough, chest tightness, shortness of breath and wheezing  Cardiovascular: Negative for chest pain, palpitations and leg swelling  Gastrointestinal: Negative for abdominal pain, blood in stool, constipation, diarrhea, nausea and vomiting  Genitourinary: Negative for difficulty urinating, dysuria, frequency and hematuria  Musculoskeletal: Negative for arthralgias, gait problem and joint swelling  Skin: Negative for rash and wound  Allergic/Immunologic: Negative for environmental allergies and food allergies     Neurological: Negative for dizziness, syncope, weakness, numbness and headaches  Hematological: Negative for adenopathy  Does not bruise/bleed easily  Psychiatric/Behavioral: Negative for confusion, decreased concentration and sleep disturbance  The patient is not nervous/anxious  Objective:      BP (!) 180/100   Temp 98 5 °F (36 9 °C)   Ht 5' 7" (1 702 m)   Wt 88 5 kg (195 lb)   BMI 30 54 kg/m²          Physical Exam   Constitutional: He is oriented to person, place, and time  He appears well-developed and well-nourished  HENT:   Head: Normocephalic and atraumatic  Right Ear: External ear normal    Left Ear: External ear normal    Nose: Nose normal    Mouth/Throat: Oropharynx is clear and moist    Neck: Normal range of motion  Neck supple  Cardiovascular: Normal rate and regular rhythm  Pulmonary/Chest: Effort normal and breath sounds normal    Neurological: He is alert and oriented to person, place, and time  Skin: Skin is dry  Psychiatric: He has a normal mood and affect  His behavior is normal  Judgment and thought content normal    Nursing note and vitals reviewed

## 2019-11-26 NOTE — PATIENT INSTRUCTIONS

## 2019-11-26 NOTE — ASSESSMENT & PLAN NOTE
Discussed low salt diet with patient as well as wiehgt loss and stress management With family hisotry and his home BP elevated I will start medication lisinopril 5 mg daily and will have nurse BP check in 2 weeks

## 2019-12-10 ENCOUNTER — CLINICAL SUPPORT (OUTPATIENT)
Dept: FAMILY MEDICINE CLINIC | Facility: CLINIC | Age: 64
End: 2019-12-10

## 2019-12-10 VITALS — SYSTOLIC BLOOD PRESSURE: 130 MMHG | DIASTOLIC BLOOD PRESSURE: 84 MMHG

## 2019-12-10 DIAGNOSIS — I10 ESSENTIAL HYPERTENSION: Primary | ICD-10-CM

## 2019-12-10 NOTE — PROGRESS NOTES
Assessment/Plan:      There are no diagnoses linked to this encounter  Subjective:  Chief Complaint   Patient presents with    Hypertension          Patient ID: Fredrick Livingston is a 62 y o  male      HPI    Review of Systems      Objective:     Physical Exam

## 2020-02-25 ENCOUNTER — OFFICE VISIT (OUTPATIENT)
Dept: SLEEP CENTER | Facility: CLINIC | Age: 65
End: 2020-02-25
Payer: COMMERCIAL

## 2020-02-25 VITALS
HEART RATE: 57 BPM | BODY MASS INDEX: 30.61 KG/M2 | SYSTOLIC BLOOD PRESSURE: 132 MMHG | HEIGHT: 67 IN | WEIGHT: 195 LBS | DIASTOLIC BLOOD PRESSURE: 84 MMHG

## 2020-02-25 DIAGNOSIS — I10 ESSENTIAL HYPERTENSION: ICD-10-CM

## 2020-02-25 DIAGNOSIS — G47.8 UNREFRESHED BY SLEEP: ICD-10-CM

## 2020-02-25 DIAGNOSIS — G47.33 OBSTRUCTIVE SLEEP APNEA SYNDROME: Primary | ICD-10-CM

## 2020-02-25 DIAGNOSIS — E66.9 OBESITY (BMI 30.0-34.9): ICD-10-CM

## 2020-02-25 PROCEDURE — 99204 OFFICE O/P NEW MOD 45 MIN: CPT | Performed by: INTERNAL MEDICINE

## 2020-02-25 NOTE — PATIENT INSTRUCTIONS
What is HAROON? Obstructive sleep apnea is a common and serious sleep disorder that causes you to stop breathing during sleep  The airway repeatedly becomes blocked, limiting the amount of air that reaches your lungs  When this happens, you may snore loudly or making choking noises as you try to breathe  Your brain and body becomes oxygen deprived and you may wake up  This may happen a few times a night, or in more severe cases, several hundred times a night  Sleep apnea can make you wake up in the morning feeling tired or unrefreshed even though you have had a full night of sleep  During the day, you may feel fatigued, have difficulty concentrating or you may even unintentionally fall asleep  This is because your body is waking up numerous times throughout the night, even though you might not be conscious of each awakening  The lack of oxygen your body receives can have negative long-term consequences for your health  This includes:  High blood pressure  Heart disease  Irregular heart rhythms  Stroke  Pre-diabetes and diabetes  Depression    Testing  An objective evaluation of your sleep may be needed before your board certified sleep physician can make a diagnosis  Options include:   In-lab overnight sleep study  This type of sleep study requires you to stay overnight at a sleep center, in a bed that may resemble a hotel room  You will sleep with sensors hooked up to various parts of your body  These sensors record your brain waves, heartbeat, breathing and movement  An overnight sleep study also provides your doctor with the most complete information about your sleep  Learn more about an overnight sleep study      Home sleep apnea test  Some patients with high risk factors for obstructive sleep apnea and no other medical disorders may be candidates for a home sleep apnea test  The testing equipment differs in that it is less complicated than what is used in an overnight sleep study   As such, does not give all the data an in-lab will and if negative, may not mean you do not have the problem  Treatment for sleep apnea  includes using a continuous positive airway pressure (CPAP) machine to keep your airway open during sleep  A mask is placed over your nose and mouth, or just your nose  The mask is hooked to the CPAP machine that blows a gentle stream of air into the mask when you breathe  This helps keep your airway open so you can breathe more regularly  Extra oxygen may be given to you through the machine  You may be given a mouth device  It looks like a mouth guard or dental retainer and stops your tongue and mouth tissues from blocking your throat while you sleep  Surgery may be needed to remove extra tissues that block your mouth, throat, or nose  Manage sleep apnea:   Do not smoke  Nicotine and other chemicals in cigarettes and cigars can cause lung damage  Ask your healthcare provider for information if you currently smoke and need help to quit  E-cigarettes or smokeless tobacco still contain nicotine  Talk to your healthcare provider before you use these products  Do not drink alcohol or take sedative medicine before you go to sleep  Alcohol and sedatives can relax the muscles and tissues around your throat  This can block the airflow to your lungs  Maintain a healthy weight  Excess tissue around your throat may restrict your breathing  Ask your healthcare provider for information if you need to lose weight  Sleep on your side or use pillows designed to prevent sleep apnea  This prevents your tongue or other tissues from blocking your throat  You can also raise the head of your bed  Driving Safety  Refrain from driving when drowsy  Follow up with your healthcare provider as directed:  Write down your questions so you remember to ask them during your visits  Go to [employer] website for more information: Sleepeducation  org     What is HAROON?    Obstructive sleep apnea is a common and serious sleep disorder that causes you to stop breathing during sleep  The airway repeatedly becomes blocked, limiting the amount of air that reaches your lungs  When this happens, you may snore loudly or making choking noises as you try to breathe  Your brain and body becomes oxygen deprived and you may wake up  This may happen a few times a night, or in more severe cases, several hundred times a night  Sleep apnea can make you wake up in the morning feeling tired or unrefreshed even though you have had a full night of sleep  During the day, you may feel fatigued, have difficulty concentrating or you may even unintentionally fall asleep  This is because your body is waking up numerous times throughout the night, even though you might not be conscious of each awakening  The lack of oxygen your body receives can have negative long-term consequences for your health  This includes:  High blood pressure  Heart disease  Irregular heart rhythms  Stroke  Pre-diabetes and diabetes  Depression    Testing  An objective evaluation of your sleep may be needed before your board certified sleep physician can make a diagnosis  Options include:   In-lab overnight sleep study  This type of sleep study requires you to stay overnight at a sleep center, in a bed that may resemble a hotel room  You will sleep with sensors hooked up to various parts of your body  These sensors record your brain waves, heartbeat, breathing and movement  An overnight sleep study also provides your doctor with the most complete information about your sleep  Learn more about an overnight sleep study      Home sleep apnea test  Some patients with high risk factors for obstructive sleep apnea and no other medical disorders may be candidates for a home sleep apnea test  The testing equipment differs in that it is less complicated than what is used in an overnight sleep study   As such, does not give all the data an in-lab will and if negative, may not mean you do not have the problem  Treatment for sleep apnea  includes using a continuous positive airway pressure (CPAP) machine to keep your airway open during sleep  A mask is placed over your nose and mouth, or just your nose  The mask is hooked to the CPAP machine that blows a gentle stream of air into the mask when you breathe  This helps keep your airway open so you can breathe more regularly  Extra oxygen may be given to you through the machine  You may be given a mouth device  It looks like a mouth guard or dental retainer and stops your tongue and mouth tissues from blocking your throat while you sleep  Surgery may be needed to remove extra tissues that block your mouth, throat, or nose  Manage sleep apnea:   Do not smoke  Nicotine and other chemicals in cigarettes and cigars can cause lung damage  Ask your healthcare provider for information if you currently smoke and need help to quit  E-cigarettes or smokeless tobacco still contain nicotine  Talk to your healthcare provider before you use these products  Do not drink alcohol or take sedative medicine before you go to sleep  Alcohol and sedatives can relax the muscles and tissues around your throat  This can block the airflow to your lungs  Maintain a healthy weight  Excess tissue around your throat may restrict your breathing  Ask your healthcare provider for information if you need to lose weight  Sleep on your side or use pillows designed to prevent sleep apnea  This prevents your tongue or other tissues from blocking your throat  You can also raise the head of your bed  Driving Safety  Refrain from driving when drowsy  Follow up with your healthcare provider as directed:  Write down your questions so you remember to ask them during your visits  Go to AAS website for more information: Sleepeducation  org       Nursing Support:  When: Monday through Friday 7A-5PM except holidays  Where: Our direct line is [phone]      If you are having a true emergency please call 911  In the event that the line is busy or it is after hours please leave a voice message and we will return your call  Please speak clearly, leaving your full name, birth date, best number to reach you and the reason for your call  Medication refills: We will need the name of the medication, the dosage, the ordering provider, whether you get a 30 or 90 day refill, and the pharmacy name and address  Medications will be ordered by the provider only  Nurses cannot call in prescriptions  Please allow 7 days for medication refills  Physician requested updates: If your provider requested that you call with an update after starting medication, please be ready to provide us the medication and dosage, what time you take your medication, the time you attempt to fall asleep, time you fall asleep, when you wake up, and what time you get out of bed  Sleep Study Results: We will contact you with sleep study results and/or next steps after the physician has reviewed your testing

## 2020-02-25 NOTE — PROGRESS NOTES
Consultation - [address]  [facility]  62 y o  male  [id number]  GYX:[id number]    Physician Requesting Consult: Billy Faust DO     Reason for Consult : At your kind request I saw this patient for initial sleep evaluation today  Home sleep testing was undertaken to evaluate for sleep disordered breathing and patient is here to review results and further options  The study demonstrated : JARROD (respiratory event index of) 24 /hour  Minimum oxygen saturation was 74%  3 1% of the study was spent with saturations less than 90%  The snore index was 20 4%  PFSH, Problem List, Medications & Allergies were reviewed in EMR  He  has a past medical history of Hyperglycemia and Hypertension  He has a current medication list which includes the following prescription(s): lisinopril  HPI:  Study was undertaken because of his wife's complaints of loud snoring and witnessed apneas of several years duration  He is not aware of snoring or breathing difficulties during sleep  Symptoms are worse when he is extremely tired  Other Complaints: none  Restless Leg Syndrome: reports no suggestive symptoms    Parasomnia: no features reported    Sleep Routine:   Typical Bedtime:  Midnight Gets OOB:  7:00 a m  TIB:7 hrs  Sleep latency:< 15 minutes Sleep Interruptions:1-2/nite because of nocturia and is able to fall back asleep  Awakens: spontaneously  Upon awakening: usually feels refreshed  He denied Excessive Daytime Sleepiness but tends to doze off unintentionally after meals, even breakfast  [place] Sleepiness Scale rated at Total score: 2 /24  Habits: reports that he has never smoked  He has never used smokeless tobacco  , reports that he drinks alcohol  ,  reports that he does not use drugs  ,Caffeine use:limited , Exercise routine: regular    Family History: Mother had excessive daytime sleepiness  ROS: reviewed & as attached  Significant for weight has been stable    He reports no nasal, respiratory, cardiac or gastrointestinal symptoms  EXAM:  /84   Pulse 57   Ht 5' 7" (1 702 m)   Wt 88 5 kg (195 lb)   BMI 30 54 kg/m²    General: Well groomed male, well appearing, in no apparent distress  Psychiatric: Alert and orientated; Cooperative; Speech:clear and coherent; Normal mood, affect & thought   HEENT:  Craniofacial anatomy: normal Sinuses: non- tender  TMJ: Normal    Eyes: EOM's intact;  conjunctiva/corneas clear   Ears: Externallyappear normal     Nasal Airway: is patent Septum:intact; Mucosa: normal; Turbinates: normal; Rhinorrhea: None   Mouth: Lips: normal posture; Dentition: irregular  Mucosa:moist  ; Hard Palate:narrow and high arched   Oropharryx: crowded and AP narrowing Tongue: Mallampati:Class IV and MobileSoft Palate:  redundant  Tonsils: no hypertrophy  Neck: is thick; Neck Circumference: 17 "; Supple; no abnormal masses; Thyroid:normal  Trachea:central     Lymph: No Cervical or Submandibular Lymhadenopathy  Heart: S1,S2 normal; RRR; no gallop; nomurmurs   Lungs: Respiratory Effort:normal  Air entry good bilaterally  No wheezes  No rales  Abdomen: Obese, Soft & non-tender    Extremities: No pedal edema  No clubbing or cyanosis  Skin: warm and dry; Color& Hydration good; no facial rashes or lesions   Neurological: CNII-XII intact; Motor normal; Sensation normal  Musculoskeletal: Muscle bulk, tone and power WNL Gait:normal        IMPRESSION: Primary, Secondary Sleep Diagnoses (to Medical or Psych conditions) & Comorbidities   1  Obstructive sleep apnea syndrome  Ambulatory referral to Sleep Medicine   2  Unrefreshed by sleep  Ambulatory referral to Sleep Medicine   3  Essential hypertension     4  Obesity (BMI 30 0-34  9)        PLAN:  1  I reviewed results of the Sleep study with the patient      2  With respect to above conditions, I counseled on pathophysiology, diagnosis, treatment options, risks and benefits; inter-relationship and effects on symptoms and comorbidities; risks of no treatment; costs and insurance aspects  3 Patient elected positive airway pressure therapy and is to be scheduled for a titration study  4   I also advised on weight reduction  5  Follow-up to be scheduled after the study to review results and to initiate therapy           Sincerely,     Authenticated electronically by Renee Germain MD   on 93/86/81   Board Certified Specialist

## 2020-03-26 ENCOUNTER — OFFICE VISIT (OUTPATIENT)
Dept: FAMILY MEDICINE CLINIC | Facility: CLINIC | Age: 65
End: 2020-03-26
Payer: COMMERCIAL

## 2020-03-26 ENCOUNTER — TELEPHONE (OUTPATIENT)
Dept: OTHER | Facility: OTHER | Age: 65
End: 2020-03-26

## 2020-03-26 VITALS
WEIGHT: 195.8 LBS | DIASTOLIC BLOOD PRESSURE: 92 MMHG | HEIGHT: 67 IN | SYSTOLIC BLOOD PRESSURE: 140 MMHG | BODY MASS INDEX: 30.73 KG/M2

## 2020-03-26 DIAGNOSIS — I10 ESSENTIAL HYPERTENSION: Primary | ICD-10-CM

## 2020-03-26 DIAGNOSIS — G47.33 OBSTRUCTIVE SLEEP APNEA SYNDROME: ICD-10-CM

## 2020-03-26 DIAGNOSIS — E66.9 OBESITY (BMI 30.0-34.9): ICD-10-CM

## 2020-03-26 DIAGNOSIS — Z11.59 ENCOUNTER FOR HEPATITIS C SCREENING TEST FOR LOW RISK PATIENT: ICD-10-CM

## 2020-03-26 PROCEDURE — 3008F BODY MASS INDEX DOCD: CPT | Performed by: FAMILY MEDICINE

## 2020-03-26 PROCEDURE — 1036F TOBACCO NON-USER: CPT | Performed by: FAMILY MEDICINE

## 2020-03-26 PROCEDURE — 99214 OFFICE O/P EST MOD 30 MIN: CPT | Performed by: FAMILY MEDICINE

## 2020-03-26 PROCEDURE — 3077F SYST BP >= 140 MM HG: CPT | Performed by: FAMILY MEDICINE

## 2020-03-26 PROCEDURE — 3080F DIAST BP >= 90 MM HG: CPT | Performed by: FAMILY MEDICINE

## 2020-03-26 RX ORDER — LISINOPRIL 5 MG/1
5 TABLET ORAL DAILY
Qty: 90 TABLET | Refills: 1 | Status: SHIPPED | OUTPATIENT
Start: 2020-03-26 | End: 2020-09-21

## 2020-03-26 NOTE — PATIENT INSTRUCTIONS

## 2020-03-26 NOTE — PROGRESS NOTES
Assessment/Plan:    Obstructive sleep apnea syndrome  Patient for CPAP titration study in May Patient will continue current care     Obesity (BMI 30 0-34  9)  Patient needs to watch diet and try to exercise more     Essential hypertension  Blood pressure continue with current care and follwoup in 3months       Diagnoses and all orders for this visit:    Essential hypertension  -     lisinopril (ZESTRIL) 5 mg tablet; Take 1 tablet (5 mg total) by mouth daily  -     Comprehensive metabolic panel; Future    Obesity (BMI 30 0-34 9)  -     Comprehensive metabolic panel; Future    Obstructive sleep apnea syndrome    Encounter for hepatitis C screening test for low risk patient  -     Hepatitis C antibody; Future          Subjective:   Chief Complaint   Patient presents with    Hypertension     med refill  pt hasnt taken med since Jan           Patient ID: Pricilla Downey is a 62 y o  male  Patient is here for followup of hypertension obesity and sleep apnea Patient stopped the medication in January when he ran out Patient blood pressure within one week started to go up to 140-150 Patient has sleep apnea He has appt for titration study in May patient is trying to watch his diet last labs were in 11/2019 Patient has no new concerns at this time       The following portions of the patient's history were reviewed and updated as appropriate: allergies, current medications, past medical history, past social history and problem list     Review of Systems   Constitutional: Negative for fatigue, fever and unexpected weight change  HENT: Negative for congestion, sinus pain and trouble swallowing  Eyes: Negative for discharge and visual disturbance  Respiratory: Negative for cough, chest tightness, shortness of breath and wheezing  Cardiovascular: Negative for chest pain, palpitations and leg swelling  Gastrointestinal: Negative for abdominal pain, blood in stool, constipation, diarrhea, nausea and vomiting  Genitourinary: Negative for difficulty urinating, dysuria, frequency and hematuria  Musculoskeletal: Negative for arthralgias, gait problem and joint swelling  Skin: Negative for rash and wound  Allergic/Immunologic: Negative for environmental allergies and food allergies  Neurological: Negative for dizziness, syncope, weakness, numbness and headaches  Hematological: Negative for adenopathy  Does not bruise/bleed easily  Psychiatric/Behavioral: Negative for confusion, decreased concentration and sleep disturbance  The patient is not nervous/anxious  Objective:      /92   Ht 5' 7" (1 702 m)   Wt 88 8 kg (195 lb 12 8 oz)   BMI 30 67 kg/m²          Physical Exam   Constitutional: He is oriented to person, place, and time  He appears well-developed and well-nourished  HENT:   Head: Normocephalic and atraumatic  Right Ear: Hearing, tympanic membrane and external ear normal    Left Ear: Hearing, tympanic membrane and external ear normal    Eyes: Pupils are equal, round, and reactive to light  Conjunctivae and EOM are normal    Neck: Neck supple  No thyromegaly present  Cardiovascular: Normal rate and normal heart sounds  Pulmonary/Chest: Effort normal and breath sounds normal  He has no wheezes  He has no rales  Abdominal: Soft  Bowel sounds are normal  He exhibits no distension  There is no tenderness  Musculoskeletal: He exhibits no edema or tenderness  Lymphadenopathy:     He has no cervical adenopathy  Neurological: He is alert and oriented to person, place, and time  No cranial nerve deficit  Coordination normal    Skin: Skin is warm and dry  No rash noted  Psychiatric: He has a normal mood and affect  His behavior is normal  Judgment and thought content normal    Nursing note and vitals reviewed

## 2020-05-01 LAB
ALBUMIN SERPL-MCNC: 4 G/DL (ref 3.6–5.1)
ALBUMIN/GLOB SERPL: 1.4 (CALC) (ref 1–2.5)
ALP SERPL-CCNC: 55 U/L (ref 35–144)
ALT SERPL-CCNC: 22 U/L (ref 9–46)
AST SERPL-CCNC: 17 U/L (ref 10–35)
BILIRUB SERPL-MCNC: 0.6 MG/DL (ref 0.2–1.2)
BUN SERPL-MCNC: 18 MG/DL (ref 7–25)
BUN/CREAT SERPL: 13 (CALC) (ref 6–22)
CALCIUM SERPL-MCNC: 9.2 MG/DL (ref 8.6–10.3)
CHLORIDE SERPL-SCNC: 107 MMOL/L (ref 98–110)
CO2 SERPL-SCNC: 30 MMOL/L (ref 20–32)
CREAT SERPL-MCNC: 1.35 MG/DL (ref 0.7–1.25)
GLOBULIN SER CALC-MCNC: 2.9 G/DL (CALC) (ref 1.9–3.7)
GLUCOSE SERPL-MCNC: 98 MG/DL (ref 65–99)
HCV AB S/CO SERPL IA: 0.03
HCV AB SERPL QL IA: NORMAL
POTASSIUM SERPL-SCNC: 4.8 MMOL/L (ref 3.5–5.3)
PROT SERPL-MCNC: 6.9 G/DL (ref 6.1–8.1)
SL AMB EGFR AFRICAN AMERICAN: 64 ML/MIN/1.73M2
SL AMB EGFR NON AFRICAN AMERICAN: 55 ML/MIN/1.73M2
SODIUM SERPL-SCNC: 143 MMOL/L (ref 135–146)

## 2020-05-05 ENCOUNTER — TELEPHONE (OUTPATIENT)
Dept: SLEEP CENTER | Facility: CLINIC | Age: 65
End: 2020-05-05

## 2020-05-07 ENCOUNTER — TELEPHONE (OUTPATIENT)
Dept: SLEEP CENTER | Facility: CLINIC | Age: 65
End: 2020-05-07

## 2020-05-07 DIAGNOSIS — G47.33 OSA (OBSTRUCTIVE SLEEP APNEA): Primary | ICD-10-CM

## 2020-05-21 ENCOUNTER — TELEPHONE (OUTPATIENT)
Dept: SLEEP CENTER | Facility: CLINIC | Age: 65
End: 2020-05-21

## 2020-06-01 ENCOUNTER — TELEPHONE (OUTPATIENT)
Dept: SLEEP CENTER | Facility: CLINIC | Age: 65
End: 2020-06-01

## 2020-06-26 ENCOUNTER — OFFICE VISIT (OUTPATIENT)
Dept: FAMILY MEDICINE CLINIC | Facility: CLINIC | Age: 65
End: 2020-06-26
Payer: COMMERCIAL

## 2020-06-26 VITALS
SYSTOLIC BLOOD PRESSURE: 140 MMHG | BODY MASS INDEX: 29.66 KG/M2 | TEMPERATURE: 98.2 F | HEIGHT: 67 IN | DIASTOLIC BLOOD PRESSURE: 84 MMHG | WEIGHT: 189 LBS

## 2020-06-26 DIAGNOSIS — I10 ESSENTIAL HYPERTENSION: Primary | ICD-10-CM

## 2020-06-26 DIAGNOSIS — E66.3 OVERWEIGHT: ICD-10-CM

## 2020-06-26 DIAGNOSIS — G47.33 OBSTRUCTIVE SLEEP APNEA SYNDROME: ICD-10-CM

## 2020-06-26 DIAGNOSIS — R35.1 NOCTURIA: ICD-10-CM

## 2020-06-26 PROBLEM — R73.01 FASTING HYPERGLYCEMIA: Status: RESOLVED | Noted: 2019-09-23 | Resolved: 2020-06-26

## 2020-06-26 PROCEDURE — 3008F BODY MASS INDEX DOCD: CPT | Performed by: FAMILY MEDICINE

## 2020-06-26 PROCEDURE — 3079F DIAST BP 80-89 MM HG: CPT | Performed by: FAMILY MEDICINE

## 2020-06-26 PROCEDURE — 3077F SYST BP >= 140 MM HG: CPT | Performed by: FAMILY MEDICINE

## 2020-06-26 PROCEDURE — 99214 OFFICE O/P EST MOD 30 MIN: CPT | Performed by: FAMILY MEDICINE

## 2020-06-26 PROCEDURE — 1036F TOBACCO NON-USER: CPT | Performed by: FAMILY MEDICINE

## 2020-07-02 ENCOUNTER — TRANSCRIBE ORDERS (OUTPATIENT)
Dept: FAMILY MEDICINE CLINIC | Facility: CLINIC | Age: 65
End: 2020-07-02

## 2020-07-02 ENCOUNTER — TELEPHONE (OUTPATIENT)
Dept: FAMILY MEDICINE CLINIC | Facility: CLINIC | Age: 65
End: 2020-07-02

## 2020-07-02 DIAGNOSIS — Z23 NEED FOR SHINGLES VACCINE: Primary | ICD-10-CM

## 2020-07-24 ENCOUNTER — OFFICE VISIT (OUTPATIENT)
Dept: SLEEP CENTER | Facility: CLINIC | Age: 65
End: 2020-07-24
Payer: COMMERCIAL

## 2020-07-24 ENCOUNTER — TELEPHONE (OUTPATIENT)
Dept: SLEEP CENTER | Facility: CLINIC | Age: 65
End: 2020-07-24

## 2020-07-24 VITALS
SYSTOLIC BLOOD PRESSURE: 120 MMHG | HEART RATE: 55 BPM | WEIGHT: 185.2 LBS | HEIGHT: 67 IN | DIASTOLIC BLOOD PRESSURE: 64 MMHG | OXYGEN SATURATION: 96 % | BODY MASS INDEX: 29.07 KG/M2

## 2020-07-24 DIAGNOSIS — I10 ESSENTIAL HYPERTENSION: ICD-10-CM

## 2020-07-24 DIAGNOSIS — E66.3 OVERWEIGHT WITH BODY MASS INDEX (BMI) OF 29 TO 29.9 IN ADULT: ICD-10-CM

## 2020-07-24 DIAGNOSIS — G47.33 OSA (OBSTRUCTIVE SLEEP APNEA): Primary | ICD-10-CM

## 2020-07-24 DIAGNOSIS — R40.0 DAYTIME SLEEPINESS: ICD-10-CM

## 2020-07-24 PROCEDURE — 1036F TOBACCO NON-USER: CPT | Performed by: INTERNAL MEDICINE

## 2020-07-24 PROCEDURE — 99214 OFFICE O/P EST MOD 30 MIN: CPT | Performed by: INTERNAL MEDICINE

## 2020-07-24 PROCEDURE — 3008F BODY MASS INDEX DOCD: CPT | Performed by: INTERNAL MEDICINE

## 2020-07-24 PROCEDURE — 3078F DIAST BP <80 MM HG: CPT | Performed by: INTERNAL MEDICINE

## 2020-07-24 PROCEDURE — 3074F SYST BP LT 130 MM HG: CPT | Performed by: INTERNAL MEDICINE

## 2020-07-24 NOTE — PATIENT INSTRUCTIONS

## 2020-07-24 NOTE — PROGRESS NOTES
Follow-Up Note - Sleep Center   Shira Pichardo  60 y o  male  OGL:[id number]  EBD:[id number]    CC: I saw this patient for follow-up in clinic today for Sleep disordered breathing, Coexisting Sleep and Medical Problems  Results of home sleep testing in November of 2019 demonstrated JARROD (respiratory event index of) 24 /hour  Minimum oxygen saturation was 74%  3 1% of the study was spent with saturations less than 90%  The snore index was 20 4%  Insurance declined a titration study and auto titrating Pap was initiated  PFSH, Problem List, Medications & Allergies were reviewed in EMR  Interval changes: none reported  He  has a past medical history of Hyperglycemia and Hypertension  He has a current medication list which includes the following prescription(s): lisinopril  ROS: constitutional, psychiatric, ENT, respiratory,CVS, GI, UGS, CNS, MSK, integumentary, endocrine, hematological reviewed  He has had some weight reduction  DATA REVIEWED:  using PAP > 4 hours/night 93% of the time  Estimated JARROD 20 3/hour at pressure of 17 7cm H2O @90th percentile  In the past week, he notes respiratory event index has decreased somewhat since he has taken to sleeping on his side  SUBJECTIVE: Regarding use of PAP, Yunier reports:   · He is experiencing some adverse effects: dry mouth/throat  · He is   benefiting from use: Unsure  Wife notes Singh Salgado still snores when he puts the mask on initially    Sleep Routine: He reports getting less sleep than before at 6 5 hrs sleep  ; he has no difficulty initiating or maintaining sleep   He awakens spontaneously and feels refreshed  He somewhat improved excessive drowsiness but is still dozing off in the afternoons according to his wife  He rated himself at 1 /24 on the [place] sleepiness scale  Habits: reports that he has never smoked  He has never used smokeless tobacco ,  reports that he drinks alcohol ,  reports that he does not use drugs  , Caffeine use: limited , Exercise routine: regular    EXAM: /64   Pulse 55   Ht 5' 7" (1 702 m)   Wt 84 kg (185 lb 3 2 oz)   SpO2 96%   BMI 29 01 kg/m²     Patient is well groomed; well appearing  Skin/Extrem: warm & dry; col & hydration normal; no edema  Psych: cooperativeand in no distress  Mental state anxious  CNS: Alert, orientated, clear & coherent speech  H&N: EOMI; NC/AT:no facial pressure marks, no rashes  Neck Circumference: 15 5 cm  ENMT Mucus membranes appear normal Nasal airway:patent  Oral airway:  crowded  Resp:effort is normal CVS: RRR ABD:truncal obesity MSK:Gait normal     IMPRESSION: Primary Sleep/Secondary(to Medical or Psych conditions) & comorbidities   1  HAROON (obstructive sleep apnea)  PAP DME Pressure Change    PAP DME Resupply/Reorder   2  Daytime sleepiness     3  Essential hypertension     4  Overweight with body mass index (BMI) of 29 to 29 9 in adult         PLAN:  1  I reviewed results of the Sleep studies with the patient  2  I discussed options and answered all of their questions  3  Treatment with  PAP is medically necessary and Meenu Pierson is agreable to continue use  4  Care of equipment, methods to improve comfort using PAP and importance of compliance with therapy were discussed  5  Pressure setting: change 14-20 cmH2O  I recommended a titration study but he declined  He plans to adjust his sleep position and will monitor his progress  6  Rx provided to replace supplies and Care coordinated with DME provider  7  Strategies for weight reduction were discussed  8  Follow-up is advised in 2 months or sooner if needed to monitor progress, compliance and to adjust therapy  Thank you for allowing me to participate in the care of this patient      Sincerely,    Authenticated electronically by Yesenia Larsen MD on 66/28/46   Board Certified Specialist

## 2020-07-27 ENCOUNTER — TELEPHONE (OUTPATIENT)
Dept: SLEEP CENTER | Facility: CLINIC | Age: 65
End: 2020-07-27

## 2020-09-20 DIAGNOSIS — I10 ESSENTIAL HYPERTENSION: ICD-10-CM

## 2020-09-21 RX ORDER — LISINOPRIL 5 MG/1
TABLET ORAL
Qty: 90 TABLET | Refills: 1 | Status: SHIPPED | OUTPATIENT
Start: 2020-09-21 | End: 2021-03-09 | Stop reason: SDUPTHER

## 2020-09-25 ENCOUNTER — OFFICE VISIT (OUTPATIENT)
Dept: SLEEP CENTER | Facility: CLINIC | Age: 65
End: 2020-09-25
Payer: COMMERCIAL

## 2020-09-25 VITALS
SYSTOLIC BLOOD PRESSURE: 122 MMHG | OXYGEN SATURATION: 97 % | HEIGHT: 67 IN | WEIGHT: 182.2 LBS | DIASTOLIC BLOOD PRESSURE: 70 MMHG | BODY MASS INDEX: 28.6 KG/M2 | HEART RATE: 52 BPM

## 2020-09-25 DIAGNOSIS — E66.3 OVERWEIGHT WITH BODY MASS INDEX (BMI) OF 29 TO 29.9 IN ADULT: ICD-10-CM

## 2020-09-25 DIAGNOSIS — I10 ESSENTIAL HYPERTENSION: ICD-10-CM

## 2020-09-25 DIAGNOSIS — G47.33 OSA (OBSTRUCTIVE SLEEP APNEA): Primary | ICD-10-CM

## 2020-09-25 DIAGNOSIS — R40.0 DAYTIME SLEEPINESS: ICD-10-CM

## 2020-09-25 PROCEDURE — 1036F TOBACCO NON-USER: CPT | Performed by: INTERNAL MEDICINE

## 2020-09-25 PROCEDURE — 99214 OFFICE O/P EST MOD 30 MIN: CPT | Performed by: INTERNAL MEDICINE

## 2020-09-25 PROCEDURE — 3078F DIAST BP <80 MM HG: CPT | Performed by: INTERNAL MEDICINE

## 2020-09-25 NOTE — PATIENT INSTRUCTIONS

## 2020-09-25 NOTE — PROGRESS NOTES
Follow-Up Note - Sleep Center   Michaela Pichardo  60 y o  male  MXF:[birth year]  [facility]:[id number]    CC: I saw this patient for follow-up in clinic today for Sleep disordered breathing, Coexisting Sleep and Medical Problems  Results of home sleep testing in November of 2019 demonstrated JARROD (respiratory event index of) 24 /hour  Minimum oxygen saturation was 74%  3 1% of the study was spent with saturations less than 90%  The snore index was 20 4%  Insurance declined a titration study and auto titrating Pap was initiated  PFSH, Problem List, Medications & Allergies were reviewed in EMR  Interval changes: none reported  He  has a past medical history of Hyperglycemia and Hypertension  He has a current medication list which includes the following prescription(s): lisinopril  ROS: constitutional, psychiatric, ENT, respiratory,CVS, GI, UGS, CNS, MSK, integumentary, endocrine, hematological reviewed  Significant for some weight reduction since his last visit  Dara Soulier DATA REVIEWED:  using PAP > 4 hours/night 87% of the time  Estimated JARROD 9 5/hour at pressure of 17 5cm H2O @90th percentile  SUBJECTIVE: Regarding use of PAP, Yunier reports:   · He is experiencing some adverse effects: mask leaks   · He is   benefiting from use: sleeping better and no longer snoring / having breathing difficulties   Sleep Routine: He reports getting 7 hrs sleep; he has no difficulty initiating or maintaining sleep   He awakens spontaneously and usually feels refreshed  He denied excessive drowsiness but at times does feel like he could nap in the afternoons  He rated himself at Total score: 3 /24 on the Morral sleepiness scale  Habits: reports that he has never smoked  He has never used smokeless tobacco ,  reports current alcohol use ,  reports no history of drug use , Caffeine use: limited , Exercise routine: regular          EXAM: /70   Pulse (!) 52   Ht 5' 7" (1 702 m)   Wt 82 6 kg (182 lb 3 2 oz)   SpO2 97%   BMI 28 54 kg/m²     Patient is well groomed; well appearing  Skin/Extrem: warm & dry; col & hydration normal; no edema  Psych: cooperativeand in no distress  Mental state appears normal   CNS: Alert, orientated, clear & coherent speech  H&N: EOMI; NC/AT:no facial pressure marks, no rashes  Neck Circumference: 15 cm  ENMT Mucus membranes appear normal Nasal airway:patent  Oral airway:  crowded  Resp:effort is normal CVS: RRR ABD:truncal obesity MSK:Gait normal     IMPRESSION: Primary Sleep/Secondary(to Medical or Psych conditions) & comorbidities   1  HAROON (obstructive sleep apnea)  PAP DME Resupply/Reorder    PAP DME Pressure Change   2  Daytime sleepiness     3  Essential hypertension     4  Overweight with body mass index (BMI) of 29 to 29 9 in adult         PLAN:  1  Treatment with  PAP is medically necessary and Yuki Blend is agreable to continue use  2  Care of equipment, methods to improve comfort using PAP and importance of compliance with therapy were discussed  3  Pressure setting: change 15-20 cmH2O     4  Rx provided to replace supplies and Care coordinated with DME provider  5  He was encouraged to persist with efforts at weight reduction  6  I also advised allowing sufficient opportunity for sleep  7  Follow-up is advised in 4 months to monitor progress, compliance and to adjust therapy  Thank you for allowing me to participate in the care of this patient      Sincerely,    Authenticated electronically by Odilon Doherty MD on 46/85/82   Board Certified Specialist

## 2020-09-28 ENCOUNTER — TELEPHONE (OUTPATIENT)
Dept: SLEEP CENTER | Facility: CLINIC | Age: 65
End: 2020-09-28

## 2020-10-15 ENCOUNTER — CLINICAL SUPPORT (OUTPATIENT)
Dept: FAMILY MEDICINE CLINIC | Facility: CLINIC | Age: 65
End: 2020-10-15
Payer: COMMERCIAL

## 2020-10-15 DIAGNOSIS — Z23 NEED FOR SHINGLES VACCINE: Primary | ICD-10-CM

## 2020-10-15 PROCEDURE — 90471 IMMUNIZATION ADMIN: CPT | Performed by: FAMILY MEDICINE

## 2020-10-15 PROCEDURE — 90750 HZV VACC RECOMBINANT IM: CPT | Performed by: FAMILY MEDICINE

## 2020-11-24 ENCOUNTER — VBI (OUTPATIENT)
Dept: ADMINISTRATIVE | Facility: OTHER | Age: 65
End: 2020-11-24

## 2020-12-24 ENCOUNTER — OFFICE VISIT (OUTPATIENT)
Dept: FAMILY MEDICINE CLINIC | Facility: CLINIC | Age: 65
End: 2020-12-24
Payer: COMMERCIAL

## 2020-12-24 VITALS
WEIGHT: 184 LBS | BODY MASS INDEX: 28.88 KG/M2 | DIASTOLIC BLOOD PRESSURE: 68 MMHG | TEMPERATURE: 96.3 F | HEIGHT: 67 IN | SYSTOLIC BLOOD PRESSURE: 124 MMHG

## 2020-12-24 DIAGNOSIS — Z12.5 SCREENING FOR PROSTATE CANCER: ICD-10-CM

## 2020-12-24 DIAGNOSIS — I10 ESSENTIAL HYPERTENSION: Primary | ICD-10-CM

## 2020-12-24 DIAGNOSIS — E66.3 OVERWEIGHT: ICD-10-CM

## 2020-12-24 DIAGNOSIS — Z23 NEED FOR VACCINATION: ICD-10-CM

## 2020-12-24 DIAGNOSIS — G47.33 OBSTRUCTIVE SLEEP APNEA SYNDROME: ICD-10-CM

## 2020-12-24 PROCEDURE — 3078F DIAST BP <80 MM HG: CPT | Performed by: FAMILY MEDICINE

## 2020-12-24 PROCEDURE — 90471 IMMUNIZATION ADMIN: CPT | Performed by: FAMILY MEDICINE

## 2020-12-24 PROCEDURE — 1036F TOBACCO NON-USER: CPT | Performed by: FAMILY MEDICINE

## 2020-12-24 PROCEDURE — 3074F SYST BP LT 130 MM HG: CPT | Performed by: FAMILY MEDICINE

## 2020-12-24 PROCEDURE — 99214 OFFICE O/P EST MOD 30 MIN: CPT | Performed by: FAMILY MEDICINE

## 2020-12-24 PROCEDURE — 3725F SCREEN DEPRESSION PERFORMED: CPT | Performed by: FAMILY MEDICINE

## 2020-12-24 PROCEDURE — 3008F BODY MASS INDEX DOCD: CPT | Performed by: FAMILY MEDICINE

## 2020-12-24 PROCEDURE — 90750 HZV VACC RECOMBINANT IM: CPT | Performed by: FAMILY MEDICINE

## 2020-12-25 LAB
ALBUMIN SERPL-MCNC: 3.9 G/DL (ref 3.6–5.1)
ALBUMIN/GLOB SERPL: 1.4 (CALC) (ref 1–2.5)
ALP SERPL-CCNC: 49 U/L (ref 35–144)
ALT SERPL-CCNC: 15 U/L (ref 9–46)
AST SERPL-CCNC: 15 U/L (ref 10–35)
BILIRUB SERPL-MCNC: 0.7 MG/DL (ref 0.2–1.2)
BUN SERPL-MCNC: 16 MG/DL (ref 7–25)
BUN/CREAT SERPL: ABNORMAL (CALC) (ref 6–22)
CALCIUM SERPL-MCNC: 8.8 MG/DL (ref 8.6–10.3)
CHLORIDE SERPL-SCNC: 109 MMOL/L (ref 98–110)
CHOLEST SERPL-MCNC: 147 MG/DL
CHOLEST/HDLC SERPL: 3.3 (CALC)
CO2 SERPL-SCNC: 24 MMOL/L (ref 20–32)
CREAT SERPL-MCNC: 1.1 MG/DL (ref 0.7–1.25)
GLOBULIN SER CALC-MCNC: 2.8 G/DL (CALC) (ref 1.9–3.7)
GLUCOSE SERPL-MCNC: 100 MG/DL (ref 65–99)
HDLC SERPL-MCNC: 45 MG/DL
LDLC SERPL CALC-MCNC: 87 MG/DL (CALC)
NONHDLC SERPL-MCNC: 102 MG/DL (CALC)
POTASSIUM SERPL-SCNC: 4.6 MMOL/L (ref 3.5–5.3)
PROT SERPL-MCNC: 6.7 G/DL (ref 6.1–8.1)
PSA SERPL-MCNC: 3.6 NG/ML
SL AMB EGFR AFRICAN AMERICAN: 82 ML/MIN/1.73M2
SL AMB EGFR NON AFRICAN AMERICAN: 71 ML/MIN/1.73M2
SODIUM SERPL-SCNC: 141 MMOL/L (ref 135–146)
TRIGL SERPL-MCNC: 64 MG/DL

## 2021-02-03 ENCOUNTER — OFFICE VISIT (OUTPATIENT)
Dept: FAMILY MEDICINE CLINIC | Facility: CLINIC | Age: 66
End: 2021-02-03
Payer: COMMERCIAL

## 2021-02-03 VITALS
HEIGHT: 67 IN | WEIGHT: 188.2 LBS | SYSTOLIC BLOOD PRESSURE: 120 MMHG | BODY MASS INDEX: 29.54 KG/M2 | TEMPERATURE: 96 F | DIASTOLIC BLOOD PRESSURE: 68 MMHG

## 2021-02-03 DIAGNOSIS — R31.0 GROSS HEMATURIA: Primary | ICD-10-CM

## 2021-02-03 PROCEDURE — 1036F TOBACCO NON-USER: CPT | Performed by: FAMILY MEDICINE

## 2021-02-03 PROCEDURE — 3074F SYST BP LT 130 MM HG: CPT | Performed by: FAMILY MEDICINE

## 2021-02-03 PROCEDURE — 3008F BODY MASS INDEX DOCD: CPT | Performed by: FAMILY MEDICINE

## 2021-02-03 PROCEDURE — 81002 URINALYSIS NONAUTO W/O SCOPE: CPT | Performed by: FAMILY MEDICINE

## 2021-02-03 PROCEDURE — 3078F DIAST BP <80 MM HG: CPT | Performed by: FAMILY MEDICINE

## 2021-02-03 PROCEDURE — 99214 OFFICE O/P EST MOD 30 MIN: CPT | Performed by: FAMILY MEDICINE

## 2021-02-03 NOTE — PROGRESS NOTES
Assessment/Plan:    Gross hematuria  Patient with painless hematuria Patient will have urine sent for cultrue and cytology Patient will have US of bladder and kidney and will be referred to urology       Diagnoses and all orders for this visit:    Gross hematuria  -     Urine culture  -     Cytology, Non-Gyn; Future  -     Ambulatory referral to Urology; Future  -     US kidney and bladder; Future          Subjective:   Chief Complaint   Patient presents with    Blood in Urine      x 2-3 day           Patient ID: Macario Pettit is a 60 y o  male  Patient states that on Sunday after urinating in the evening  he noted urine was pinkish in color He had ate a lot of red beets and that was what he though  Patient Monday had urinated normally all day and saw nothing Patient states that last night he had noticed some pink urine at the beginning of his stream  Patient urinated first thing this morning and noticed it again His last urination was normal Patient has  No pain He has no fever or chills He has stable nocturia 2 times per night for over 8 yrs Patient has not seen urology since 11/2019 Patient PSA and kidney function testing and it was normal Patient has no pain     Blood in Urine  This is a new problem  Episode onset: 3 days ago  The problem is unchanged  He describes the hematuria as gross hematuria  The hematuria occurs during the initial portion of his urinary stream  He reports no clotting in his urine stream  His pain is at a severity of 0/10  He is experiencing no pain  He describes his urine color as tea colored  Irritative symptoms do not include frequency, nocturia or urgency  Obstructive symptoms do not include dribbling, incomplete emptying, an intermittent stream, a slower stream, straining or a weak stream  Pertinent negatives include no abdominal pain, chills, dysuria, facial swelling, fever, flank pain, genital pain, hesitancy, inability to urinate, nausea or vomiting  He is sexually active  There is no history of BPH,  trauma, hypertension, kidney stones, prostatitis, recent infection, sickle cell disease, STDs or tobacco use  The following portions of the patient's history were reviewed and updated as appropriate: allergies, current medications, past family history, past medical history, past social history, past surgical history and problem list     Review of Systems   Constitutional: Negative for chills and fever  HENT: Negative for facial swelling  Gastrointestinal: Negative for abdominal pain, nausea and vomiting  Genitourinary: Positive for hematuria  Negative for dysuria, flank pain, frequency, hesitancy, incomplete emptying, nocturia and urgency  Objective:      /68   Temp (!) 96 °F (35 6 °C)   Ht 5' 7" (1 702 m)   Wt 85 4 kg (188 lb 3 2 oz)   BMI 29 48 kg/m²          Physical Exam  Vitals signs and nursing note reviewed  Constitutional:       Appearance: Normal appearance  HENT:      Head: Normocephalic and atraumatic  Right Ear: Tympanic membrane normal       Left Ear: Tympanic membrane normal       Nose: Nose normal    Eyes:      Extraocular Movements: Extraocular movements intact  Conjunctiva/sclera: Conjunctivae normal       Pupils: Pupils are equal, round, and reactive to light  Cardiovascular:      Rate and Rhythm: Normal rate and regular rhythm  Pulses: Normal pulses  Abdominal:      General: Abdomen is flat  Bowel sounds are normal       Palpations: Abdomen is soft  Tenderness: There is no right CVA tenderness, left CVA tenderness, guarding or rebound  Neurological:      General: No focal deficit present  Mental Status: He is alert and oriented to person, place, and time  Psychiatric:         Mood and Affect: Mood normal          Behavior: Behavior normal          Thought Content:  Thought content normal

## 2021-02-03 NOTE — PATIENT INSTRUCTIONS
Increase fluids to 8 glasses of water daily Patient  will also avoid caffeineHematuria   AMBULATORY CARE:   Hematuria  is blood in your urine  Your urine may be bright red to dark brown  Signs and symptoms:   · Fever    · Nausea and vomiting    · Pain or bruising on your lower back or sides    · Pain or burning when you urinate    · More urination than usual, or the need to urinate right away    · Blood clots in the toilet after you urinate    Seek care immediately if:   · You have blood in your urine after a new injury, such as a fall  · You have severe back or side pain that does not go away with treatment  Call your doctor if:   · You are urinating very small amounts or not at all  · You feel like you cannot empty your bladder  · You have a fever that gets worse or does not go away with treatment  · You cannot keep liquids or medicines down  · Your urine gets darker, even after you drink extra liquids  · You have questions or concerns about your condition, treatment, or care  Treatment for hematuria  depends on the cause of your hematuria  Treatment may not be needed  You may need medicines to treat an infection  Ask your healthcare provider for more information about the treatment you may need  Drink liquids as directed: You may need to drink extra liquids to help flush the blood from your body through your urine  Water is the best liquid to drink  Ask how much liquid to drink each day and which liquids are best for you  Follow up with your doctor as directed:  Write down your questions so you remember to ask them during your visits  © Copyright [address] Information is for End User's use only and may not be sold, redistributed or otherwise used for commercial purposes  All illustrations and images included in CareNotes® are the copyrighted property of A D A M , Inc  or [facility] Anupama Murillo   The above information is an  only   It is not intended as medical advice for individual conditions or treatments  Talk to your doctor, nurse or pharmacist before following any medical regimen to see if it is safe and effective for you

## 2021-02-03 NOTE — ASSESSMENT & PLAN NOTE
Patient with painless hematuria Patient will have urine sent for cultrue and cytology Patient will have US of bladder and kidney and will be referred to urology

## 2021-02-04 LAB
CLINICAL INFO: NORMAL
PATH REPORT.FINAL DX SPEC: NORMAL
SPECIMEN SOURCE: NORMAL

## 2021-02-09 ENCOUNTER — HOSPITAL ENCOUNTER (OUTPATIENT)
Dept: ULTRASOUND IMAGING | Facility: MEDICAL CENTER | Age: 66
Discharge: HOME | End: 2021-02-09
Payer: COMMERCIAL

## 2021-02-09 DIAGNOSIS — R31.0 GROSS HEMATURIA: ICD-10-CM

## 2021-02-09 PROCEDURE — 76770 US EXAM ABDO BACK WALL COMP: CPT

## 2021-03-09 DIAGNOSIS — I10 ESSENTIAL HYPERTENSION: ICD-10-CM

## 2021-03-09 RX ORDER — LISINOPRIL 5 MG/1
5 TABLET ORAL DAILY
Qty: 90 TABLET | Refills: 1 | Status: SHIPPED | OUTPATIENT
Start: 2021-03-09 | End: 2021-04-07 | Stop reason: SDUPTHER

## 2021-03-30 DIAGNOSIS — Z23 ENCOUNTER FOR IMMUNIZATION: ICD-10-CM

## 2021-04-07 DIAGNOSIS — I10 ESSENTIAL HYPERTENSION: ICD-10-CM

## 2021-04-07 RX ORDER — LISINOPRIL 5 MG/1
5 TABLET ORAL DAILY
Qty: 90 TABLET | Refills: 1 | Status: SHIPPED | OUTPATIENT
Start: 2021-04-07 | End: 2021-10-06

## 2021-06-14 ENCOUNTER — OFFICE VISIT (OUTPATIENT)
Dept: UROLOGY | Facility: MEDICAL CENTER | Age: 66
End: 2021-06-14
Payer: COMMERCIAL

## 2021-06-14 VITALS
HEIGHT: 66 IN | BODY MASS INDEX: 30.37 KG/M2 | SYSTOLIC BLOOD PRESSURE: 126 MMHG | WEIGHT: 189 LBS | DIASTOLIC BLOOD PRESSURE: 74 MMHG

## 2021-06-14 DIAGNOSIS — N13.8 BPH WITH URINARY OBSTRUCTION: Primary | ICD-10-CM

## 2021-06-14 DIAGNOSIS — R97.20 ELEVATED PROSTATE SPECIFIC ANTIGEN (PSA): ICD-10-CM

## 2021-06-14 DIAGNOSIS — N40.1 BPH WITH URINARY OBSTRUCTION: Primary | ICD-10-CM

## 2021-06-14 DIAGNOSIS — R31.0 GROSS HEMATURIA: ICD-10-CM

## 2021-06-14 PROCEDURE — 3008F BODY MASS INDEX DOCD: CPT | Performed by: UROLOGY

## 2021-06-14 PROCEDURE — 1036F TOBACCO NON-USER: CPT | Performed by: UROLOGY

## 2021-06-14 PROCEDURE — 3074F SYST BP LT 130 MM HG: CPT | Performed by: UROLOGY

## 2021-06-14 PROCEDURE — 99214 OFFICE O/P EST MOD 30 MIN: CPT | Performed by: UROLOGY

## 2021-06-14 PROCEDURE — 3078F DIAST BP <80 MM HG: CPT | Performed by: UROLOGY

## 2021-06-14 PROCEDURE — 81003 URINALYSIS AUTO W/O SCOPE: CPT | Performed by: UROLOGY

## 2021-06-14 NOTE — PROGRESS NOTES
HISTORY:    Follow-up mild BPH and elevated PSA couple years ago, voiding with good stream and control rare nocturia feels good about that     Current issues gross hematuria several times over the past couple weeks  No pain urgency frequency  Renal ultrasound negative     Urine culture and cytology negative  ASSESSMENT / PLAN:    UA is negative today   I recommend cystoscopy and we will stay    The following portions of the patient's history were reviewed and updated as appropriate: allergies, current medications, past family history, past medical history, past social history, past surgical history and problem list     Review of Systems   All other systems reviewed and are negative  Objective:     Physical Exam  Constitutional:       General: He is not in acute distress  Appearance: He is well-developed  He is not diaphoretic  HENT:      Head: Normocephalic and atraumatic  Eyes:      General: No scleral icterus  Pulmonary:      Effort: Pulmonary effort is normal    Genitourinary:     Comments: Penis testes normal     Prostate minimally enlarged no nodules  Skin:     Coloration: Skin is not pale  Neurological:      Mental Status: He is alert and oriented to person, place, and time  Psychiatric:         Behavior: Behavior normal          Thought Content:  Thought content normal          Judgment: Judgment normal            0   Lab Value Date/Time    PSA 3 6 12/24/2020 0915    PSA 4 5 (H) 09/30/2019 0746   ]  BUN   Date Value Ref Range Status   12/24/2020 16 7 - 25 mg/dL Final     Creatinine   Date Value Ref Range Status   12/24/2020 1 10 0 70 - 1 25 mg/dL Final     Comment:     For patients >52years of age, the reference limit  for Creatinine is approximately 13% higher for people  identified as -American         04/02/2016 1 21 0 70 - 1 33 mg/dL Final     Comment:     Result Comment: For patients >52years of age, the reference limit  for Creatinine is approximately 13% higher for people  identified as -American  No components found for: CBC      Patient Active Problem List   Diagnosis    Essential hypertension    Nocturia    Screening for prostate cancer    Overweight    Annual physical exam    Obstructive sleep apnea syndrome    Gross hematuria    Elevated prostate specific antigen (PSA)    BPH with urinary obstruction        Diagnoses and all orders for this visit:    BPH with urinary obstruction  -     POCT urine dip auto non-scope    Elevated prostate specific antigen (PSA)    Gross hematuria  -     POCT urine dip auto non-scope           Patient ID: Laura Hand is a 73 y o  male  Current Outpatient Medications:     lisinopril (ZESTRIL) 5 mg tablet, Take 1 tablet (5 mg total) by mouth daily, Disp: 90 tablet, Rfl: 1    Past Medical History:   Diagnosis Date    Hyperglycemia     Resolved 3/31/2016     Hypertension        Past Surgical History:   Procedure Laterality Date    COLONOSCOPY      Proctitis, otherwise nml  Dr Zuluaga Backdionisio   Resolved 12/3/2008        Social History

## 2021-06-22 ENCOUNTER — RA CDI HCC (OUTPATIENT)
Dept: OTHER | Facility: HOSPITAL | Age: 66
End: 2021-06-22

## 2021-06-22 NOTE — PROGRESS NOTES
[facility] 75  coding opportunities          Chart reviewed, no opportunity found: CHART REVIEWED, NO OPPORTUNITY FOUND                     Patients insurance company: [employer] (Medicare Advantage and Commercial)

## 2021-06-28 ENCOUNTER — OFFICE VISIT (OUTPATIENT)
Dept: FAMILY MEDICINE CLINIC | Facility: CLINIC | Age: 66
End: 2021-06-28
Payer: COMMERCIAL

## 2021-06-28 VITALS
BODY MASS INDEX: 29.65 KG/M2 | HEIGHT: 66 IN | TEMPERATURE: 97.2 F | DIASTOLIC BLOOD PRESSURE: 64 MMHG | WEIGHT: 184.5 LBS | SYSTOLIC BLOOD PRESSURE: 102 MMHG

## 2021-06-28 DIAGNOSIS — N13.8 BPH WITH URINARY OBSTRUCTION: ICD-10-CM

## 2021-06-28 DIAGNOSIS — E66.3 OVERWEIGHT: ICD-10-CM

## 2021-06-28 DIAGNOSIS — I10 ESSENTIAL HYPERTENSION: Primary | ICD-10-CM

## 2021-06-28 DIAGNOSIS — R31.0 GROSS HEMATURIA: ICD-10-CM

## 2021-06-28 DIAGNOSIS — G47.33 OBSTRUCTIVE SLEEP APNEA SYNDROME: ICD-10-CM

## 2021-06-28 DIAGNOSIS — N40.1 BPH WITH URINARY OBSTRUCTION: ICD-10-CM

## 2021-06-28 PROCEDURE — 3074F SYST BP LT 130 MM HG: CPT | Performed by: FAMILY MEDICINE

## 2021-06-28 PROCEDURE — 99214 OFFICE O/P EST MOD 30 MIN: CPT | Performed by: FAMILY MEDICINE

## 2021-06-28 PROCEDURE — 3725F SCREEN DEPRESSION PERFORMED: CPT | Performed by: FAMILY MEDICINE

## 2021-06-28 PROCEDURE — 1036F TOBACCO NON-USER: CPT | Performed by: FAMILY MEDICINE

## 2021-06-28 PROCEDURE — 3008F BODY MASS INDEX DOCD: CPT | Performed by: FAMILY MEDICINE

## 2021-06-28 PROCEDURE — 3078F DIAST BP <80 MM HG: CPT | Performed by: FAMILY MEDICINE

## 2021-06-28 NOTE — PATIENT INSTRUCTIONS
Weight Management   AMBULATORY CARE:   Why it is important to manage your weight:  Being overweight increases your risk of health conditions such as heart disease, high blood pressure, type 2 diabetes, and certain types of cancer  It can also increase your risk for osteoarthritis, sleep apnea, and other respiratory problems  Aim for a slow, steady weight loss  Even a small amount of weight loss can lower your risk of health problems  How to lose weight safely:  A safe and healthy way to lose weight is to eat fewer calories and get regular exercise  · You can lose up about 1 pound a week by decreasing the number of calories you eat by 500 calories each day  You can decrease calories by eating smaller portion sizes or by cutting out high-calorie foods  Read labels to find out how many calories are in the foods you eat  · You can also burn calories with exercise such as walking, swimming, or biking  You will be more likely to keep weight off if you make these changes part of your lifestyle  Exercise at least 30 minutes per day on most days of the week  You can also fit in more physical activity by taking the stairs instead of the elevator or parking farther away from stores  Ask your healthcare provider about the best exercise plan for you  Healthy meal plan for weight management:  A healthy meal plan includes a variety of foods, contains fewer calories, and helps you stay healthy  A healthy meal plan includes the following:     · Eat whole-grain foods more often  A healthy meal plan should contain fiber  Fiber is the part of grains, fruits, and vegetables that is not broken down by your body  Whole-grain foods are healthy and provide extra fiber in your diet  Some examples of whole-grain foods are whole-wheat breads and pastas, oatmeal, brown rice, and bulgur  · Eat a variety of vegetables every day  Include dark, leafy greens such as spinach, kale, nani greens, and mustard greens   Eat yellow and orange vegetables such as carrots, sweet potatoes, and winter squash  · Eat a variety of fruits every day  Choose fresh or canned fruit (canned in its own juice or light syrup) instead of juice  Fruit juice has very little or no fiber  · Eat low-fat dairy foods  Drink fat-free (skim) milk or 1% milk  Eat fat-free yogurt and low-fat cottage cheese  Try low-fat cheeses such as mozzarella and other reduced-fat cheeses  · Choose meat and other protein foods that are low in fat  Choose beans or other legumes such as split peas or lentils  Choose fish, skinless poultry (chicken or turkey), or lean cuts of red meat (beef or pork)  Before you cook meat or poultry, cut off any visible fat  · Use less fat and oil  Try baking foods instead of frying them  Add less fat, such as margarine, sour cream, regular salad dressing and mayonnaise to foods  Eat fewer high-fat foods  Some examples of high-fat foods include french fries, doughnuts, ice cream, and cakes  · Eat fewer sweets  Limit foods and drinks that are high in sugar  This includes candy, cookies, regular soda, and sweetened drinks  Ways to decrease calories:   · Eat smaller portions  ? Use a small plate with smaller servings  ? Do not eat second helpings  ? When you eat at a restaurant, ask for a box and place half of your meal in the box before you eat  ? Share an entrée with someone else  · Replace high-calorie snacks with healthy, low-calorie snacks  ? Choose fresh fruit, vegetables, fat-free rice cakes, or air-popped popcorn instead of potato chips, nuts, or chocolate  ? Choose water or calorie-free drinks instead of soda or sweetened drinks  · Do not shop for groceries when you are hungry  You may be more likely to make unhealthy food choices  Take a grocery list of healthy foods and shop after you have eaten  · Eat regular meals  Do not skip meals  Skipping meals can lead to overeating later in the day   This can make it harder for you to lose weight  Eat a healthy snack in place of a meal if you do not have time to eat a regular meal  Talk with a dietitian to help you create a meal plan and schedule that is right for you  Other things to consider as you try to lose weight:   · Be aware of situations that may give you the urge to overeat, such as eating while watching television  Find ways to avoid these situations  For example, read a book, go for a walk, or do crafts  · Meet with a weight loss support group or friends who are also trying to lose weight  This may help you stay motivated to continue working on your weight loss goals  © Copyright [address] Information is for End User's use only and may not be sold, redistributed or otherwise used for commercial purposes  All illustrations and images included in CareNotes® are the copyrighted property of [employer]  or [facility] Anupama Murillo   The above information is an  only  It is not intended as medical advice for individual conditions or treatments  Talk to your doctor, nurse or pharmacist before following any medical regimen to see if it is safe and effective for you

## 2021-06-28 NOTE — PROGRESS NOTES
Assessment/Plan:    Essential hypertension  BP is controlled continue with current meds and followup with 6 months    Overweight  Weight is stable continue current diet Patient to work on exercise    Obstructive sleep apnea syndrome  Continue CPAP and followup with sleep medicine    Gross hematuria  For cystoscopy at the urology office    BPH with urinary obstruction  Continue to see urology        Diagnoses and all orders for this visit:    Essential hypertension  -     Comprehensive metabolic panel; Future  -     Lipid panel; Future    Overweight    Obstructive sleep apnea syndrome    BPH with urinary obstruction    Gross hematuria          Subjective:   Chief Complaint   Patient presents with    Hypertension     checkup        Patient ID: Hudson Buck is a 73 y o  male  Patient is here for followup of his hypertension his weight sleep apnea the hematuria he was in fro in the spring and also BPH Patient BP is well controlled on medication Patient is using CPAP as directed and has followup with them Patient weight is unchanged However he now is retiring and has gone back to the gym Patient is exercising 5 times per week Patient had urology visit and PSA was in 12/2020 Patient has not had any further blood in urine Patient will have cystoscopy also with urology Patient wakes one time per night to urinate and that is unchanged Patient had voth COVID shots at [facility]       The following portions of the patient's history were reviewed and updated as appropriate: allergies, current medications, past family history, past medical history, past social history, past surgical history and problem list     Review of Systems   Constitutional: Negative for fatigue, fever and unexpected weight change  HENT: Negative for congestion, sinus pain and trouble swallowing  Eyes: Negative for discharge and visual disturbance  Respiratory: Negative for cough, chest tightness, shortness of breath and wheezing      Cardiovascular: Negative for chest pain, palpitations and leg swelling  Gastrointestinal: Negative for abdominal pain, blood in stool, constipation, diarrhea, nausea and vomiting  Genitourinary: Negative for difficulty urinating, dysuria, frequency and hematuria  Nocturia No blood noted in urine since last OV    Musculoskeletal: Negative for arthralgias, gait problem and joint swelling  Skin: Negative for rash and wound  Allergic/Immunologic: Negative for environmental allergies and food allergies  Neurological: Negative for dizziness, syncope, weakness, numbness and headaches  Hematological: Negative for adenopathy  Does not bruise/bleed easily  Psychiatric/Behavioral: Negative for confusion, decreased concentration and sleep disturbance  The patient is not nervous/anxious  Objective:      /64   Temp (!) 97 2 °F (36 2 °C)   Ht 5' 6" (1 676 m)   Wt 83 7 kg (184 lb 8 oz)   BMI 29 78 kg/m²          Physical Exam  Vitals and nursing note reviewed  Constitutional:       Appearance: Normal appearance  He is well-developed  HENT:      Head: Normocephalic and atraumatic  Right Ear: Hearing, tympanic membrane and external ear normal       Left Ear: Hearing, tympanic membrane and external ear normal    Eyes:      Extraocular Movements: Extraocular movements intact  Conjunctiva/sclera: Conjunctivae normal       Pupils: Pupils are equal, round, and reactive to light  Neck:      Thyroid: No thyromegaly  Cardiovascular:      Rate and Rhythm: Normal rate and regular rhythm  Pulses: Normal pulses  Heart sounds: Normal heart sounds  Pulmonary:      Effort: Pulmonary effort is normal       Breath sounds: Normal breath sounds  No wheezing or rales  Abdominal:      General: Abdomen is flat  Bowel sounds are normal  There is no distension  Palpations: Abdomen is soft  Tenderness: There is no abdominal tenderness  Musculoskeletal:         General: No tenderness  Cervical back: Neck supple  Lymphadenopathy:      Cervical: No cervical adenopathy  Skin:     General: Skin is warm and dry  Findings: No rash  Neurological:      General: No focal deficit present  Mental Status: He is alert and oriented to person, place, and time  Cranial Nerves: No cranial nerve deficit  Coordination: Coordination normal    Psychiatric:         Mood and Affect: Mood normal          Behavior: Behavior normal          Thought Content: Thought content normal          Judgment: Judgment normal        BMI Counseling: Body mass index is 29 78 kg/m²  The BMI is above normal  Nutrition recommendations include reducing portion sizes, decreasing overall calorie intake, moderation in carbohydrate intake and increasing intake of lean protein  Exercise recommendations include exercising 3-5 times per week

## 2021-08-03 ENCOUNTER — TELEPHONE (OUTPATIENT)
Dept: UROLOGY | Facility: MEDICAL CENTER | Age: 66
End: 2021-08-03

## 2021-08-03 NOTE — TELEPHONE ENCOUNTER
Pt returning call from office  Pt states he has medicare part a and b   Pt also has [employer] supplement plan  Pt states he will be bring his new card to his appointment 8/18/21  Please be advised   Thank you

## 2021-08-18 ENCOUNTER — PROCEDURE VISIT (OUTPATIENT)
Dept: UROLOGY | Facility: MEDICAL CENTER | Age: 66
End: 2021-08-18
Payer: MEDICARE

## 2021-08-18 VITALS
BODY MASS INDEX: 28.88 KG/M2 | SYSTOLIC BLOOD PRESSURE: 104 MMHG | DIASTOLIC BLOOD PRESSURE: 66 MMHG | WEIGHT: 184 LBS | HEIGHT: 67 IN

## 2021-08-18 DIAGNOSIS — R31.0 GROSS HEMATURIA: Primary | ICD-10-CM

## 2021-08-18 PROCEDURE — 99213 OFFICE O/P EST LOW 20 MIN: CPT | Performed by: UROLOGY

## 2021-08-18 PROCEDURE — 52000 CYSTOURETHROSCOPY: CPT | Performed by: UROLOGY

## 2021-08-18 NOTE — PROGRESS NOTES
HISTORY:    Cysto for evaluation of gross hematuria which happened several months ago  None since that time         ASSESSMENT / PLAN:    Cysto shows quite large BPH  No bladder lesions  Likely prostate was the source of bleeding  Patient knows to be aware of of further episodes of bleeding, BPH symptoms  Follow-up one year    The following portions of the patient's history were reviewed and updated as appropriate: allergies, current medications, past family history, past medical history, past social history, past surgical history and problem list     Review of Systems   All other systems reviewed and are negative  Objective:     Cystoscopy     Date/Time 8/18/2021 3:15 PM     Performed by  Caridad Romberg, MD     Authorized by Caridad Romberg, MD          Procedure Details:  Procedure type: cystoscopy    Patient tolerance: Patient tolerated the procedure well with no immediate complications    Additional Procedure Details:      Patient presents for cystoscopy  I have discussed the reasons for doing the test, and the potential risks and complications  Patient expressed understanding, and signed informed consent document  The patient was carefully  positioned supine on the examining table  Sterile preparation was performed on the urethra  Xylocaine jelly was instilled and left  Indwelling for the procedure  The 13 [place] flexible cystoscope was passed with the following findings:      Urethra:  No stricture    Prostate:  lateral lobes large T LH                   Bladder:  Severe trabeculation, no lesions, tumor, or stones  Residual urine:  100 mL    Patient tolerated the procedure well and was escorted from the examining table       Physical Exam        0   Lab Value Date/Time    PSA 3 6 12/24/2020 0915    PSA 4 5 (H) 09/30/2019 0746   ]  BUN   Date Value Ref Range Status   12/24/2020 16 7 - 25 mg/dL Final     Creatinine   Date Value Ref Range Status   12/24/2020 1 10 0 70 - 1 25 mg/dL Final Comment:     For patients >52years of age, the reference limit  for Creatinine is approximately 13% higher for people  identified as -American         04/02/2016 1 21 0 70 - 1 33 mg/dL Final     Comment:     Result Comment: For patients >52years of age, the reference limit  for Creatinine is approximately 13% higher for people  identified as -American  No components found for: CBC      Patient Active Problem List   Diagnosis    Essential hypertension    Nocturia    Screening for prostate cancer    Overweight    Annual physical exam    Obstructive sleep apnea syndrome    Gross hematuria    Elevated prostate specific antigen (PSA)    BPH with urinary obstruction        There are no diagnoses linked to this encounter  Patient ID: Alyce Palacios is a 73 y o  male  Current Outpatient Medications:     lisinopril (ZESTRIL) 5 mg tablet, Take 1 tablet (5 mg total) by mouth daily, Disp: 90 tablet, Rfl: 1    Past Medical History:   Diagnosis Date    Hyperglycemia     Resolved 3/31/2016     Hypertension        Past Surgical History:   Procedure Laterality Date    COLONOSCOPY      Proctitis, otherwise nml  Dr Brittany Abraham   Resolved 12/3/2008        Social History

## 2021-11-30 LAB
ALBUMIN SERPL-MCNC: 3.9 G/DL (ref 3.6–5.1)
ALBUMIN/GLOB SERPL: 1.3 (CALC) (ref 1–2.5)
ALP SERPL-CCNC: 54 U/L (ref 35–144)
ALT SERPL-CCNC: 15 U/L (ref 9–46)
AST SERPL-CCNC: 19 U/L (ref 10–35)
BILIRUB SERPL-MCNC: 0.7 MG/DL (ref 0.2–1.2)
BUN SERPL-MCNC: 15 MG/DL (ref 7–25)
BUN/CREAT SERPL: ABNORMAL (CALC) (ref 6–22)
CALCIUM SERPL-MCNC: 9.1 MG/DL (ref 8.6–10.3)
CHLORIDE SERPL-SCNC: 106 MMOL/L (ref 98–110)
CHOLEST SERPL-MCNC: 177 MG/DL
CHOLEST/HDLC SERPL: 3.5 (CALC)
CO2 SERPL-SCNC: 30 MMOL/L (ref 20–32)
CREAT SERPL-MCNC: 1.19 MG/DL (ref 0.7–1.25)
GLOBULIN SER CALC-MCNC: 2.9 G/DL (CALC) (ref 1.9–3.7)
GLUCOSE SERPL-MCNC: 103 MG/DL (ref 65–99)
HDLC SERPL-MCNC: 50 MG/DL
LDLC SERPL CALC-MCNC: 111 MG/DL (CALC)
NONHDLC SERPL-MCNC: 127 MG/DL (CALC)
POTASSIUM SERPL-SCNC: 4.6 MMOL/L (ref 3.5–5.3)
PROT SERPL-MCNC: 6.8 G/DL (ref 6.1–8.1)
SL AMB EGFR AFRICAN AMERICAN: 74 ML/MIN/1.73M2
SL AMB EGFR NON AFRICAN AMERICAN: 64 ML/MIN/1.73M2
SODIUM SERPL-SCNC: 140 MMOL/L (ref 135–146)
TRIGL SERPL-MCNC: 70 MG/DL

## 2021-12-22 ENCOUNTER — RA CDI HCC (OUTPATIENT)
Dept: OTHER | Facility: HOSPITAL | Age: 66
End: 2021-12-22

## 2021-12-29 ENCOUNTER — OFFICE VISIT (OUTPATIENT)
Dept: FAMILY MEDICINE CLINIC | Facility: CLINIC | Age: 66
End: 2021-12-29
Payer: MEDICARE

## 2021-12-29 VITALS
BODY MASS INDEX: 27.65 KG/M2 | SYSTOLIC BLOOD PRESSURE: 124 MMHG | TEMPERATURE: 97.9 F | HEIGHT: 67 IN | WEIGHT: 176.2 LBS | DIASTOLIC BLOOD PRESSURE: 72 MMHG

## 2021-12-29 DIAGNOSIS — E66.3 OVERWEIGHT: ICD-10-CM

## 2021-12-29 DIAGNOSIS — I10 ESSENTIAL HYPERTENSION: ICD-10-CM

## 2021-12-29 DIAGNOSIS — G45.9 TIA (TRANSIENT ISCHEMIC ATTACK): Primary | ICD-10-CM

## 2021-12-29 DIAGNOSIS — N13.8 BPH WITH URINARY OBSTRUCTION: ICD-10-CM

## 2021-12-29 DIAGNOSIS — N40.1 BPH WITH URINARY OBSTRUCTION: ICD-10-CM

## 2021-12-29 DIAGNOSIS — G47.33 OBSTRUCTIVE SLEEP APNEA SYNDROME: ICD-10-CM

## 2021-12-29 PROBLEM — R47.01 APHASIA: Status: ACTIVE | Noted: 2021-12-23

## 2021-12-29 PROBLEM — R31.0 GROSS HEMATURIA: Status: RESOLVED | Noted: 2021-02-03 | Resolved: 2021-12-29

## 2021-12-29 PROBLEM — H53.131 ACUTE LOSS OF VISION, RIGHT: Status: ACTIVE | Noted: 2021-12-23

## 2021-12-29 PROBLEM — R53.1 RIGHT SIDED WEAKNESS: Status: RESOLVED | Noted: 2021-12-23 | Resolved: 2021-12-29

## 2021-12-29 PROBLEM — R47.01 APHASIA: Status: RESOLVED | Noted: 2021-12-23 | Resolved: 2021-12-29

## 2021-12-29 PROBLEM — H53.131 ACUTE LOSS OF VISION, RIGHT: Status: RESOLVED | Noted: 2021-12-23 | Resolved: 2021-12-29

## 2021-12-29 PROBLEM — R53.1 RIGHT SIDED WEAKNESS: Status: ACTIVE | Noted: 2021-12-23

## 2021-12-29 PROCEDURE — 99214 OFFICE O/P EST MOD 30 MIN: CPT | Performed by: FAMILY MEDICINE

## 2021-12-29 RX ORDER — ATORVASTATIN CALCIUM 40 MG/1
40 TABLET, FILM COATED ORAL DAILY
COMMUNITY
Start: 2021-12-24 | End: 2022-03-22 | Stop reason: SDUPTHER

## 2021-12-29 RX ORDER — CLOPIDOGREL BISULFATE 75 MG/1
75 TABLET ORAL DAILY
COMMUNITY
Start: 2021-12-25 | End: 2022-01-15

## 2021-12-29 RX ORDER — TAMSULOSIN HYDROCHLORIDE 0.4 MG/1
0.4 CAPSULE ORAL
Qty: 90 CAPSULE | Refills: 1 | Status: SHIPPED | OUTPATIENT
Start: 2021-12-29 | End: 2022-07-01 | Stop reason: SDUPTHER

## 2021-12-29 RX ORDER — ASPIRIN 81 MG/1
81 TABLET, CHEWABLE ORAL DAILY
COMMUNITY
Start: 2021-12-25 | End: 2022-12-25

## 2022-02-28 DIAGNOSIS — I10 ESSENTIAL HYPERTENSION: ICD-10-CM

## 2022-02-28 RX ORDER — LISINOPRIL 5 MG/1
5 TABLET ORAL DAILY
Qty: 90 TABLET | Refills: 1 | Status: SHIPPED | OUTPATIENT
Start: 2022-02-28

## 2022-03-22 DIAGNOSIS — I10 ESSENTIAL HYPERTENSION: Primary | ICD-10-CM

## 2022-03-22 RX ORDER — ATORVASTATIN CALCIUM 40 MG/1
40 TABLET, FILM COATED ORAL DAILY
Qty: 30 TABLET | Refills: 3 | Status: SHIPPED | OUTPATIENT
Start: 2022-03-22 | End: 2022-04-18 | Stop reason: SDUPTHER

## 2022-03-28 ENCOUNTER — OFFICE VISIT (OUTPATIENT)
Dept: FAMILY MEDICINE CLINIC | Facility: CLINIC | Age: 67
End: 2022-03-28
Payer: MEDICARE

## 2022-03-28 VITALS
HEIGHT: 67 IN | WEIGHT: 182 LBS | BODY MASS INDEX: 28.56 KG/M2 | TEMPERATURE: 97.8 F | DIASTOLIC BLOOD PRESSURE: 78 MMHG | SYSTOLIC BLOOD PRESSURE: 120 MMHG

## 2022-03-28 DIAGNOSIS — G47.33 OBSTRUCTIVE SLEEP APNEA SYNDROME: ICD-10-CM

## 2022-03-28 DIAGNOSIS — N40.1 BPH WITH URINARY OBSTRUCTION: ICD-10-CM

## 2022-03-28 DIAGNOSIS — G45.9 TIA (TRANSIENT ISCHEMIC ATTACK): ICD-10-CM

## 2022-03-28 DIAGNOSIS — E78.2 MIXED HYPERLIPIDEMIA: ICD-10-CM

## 2022-03-28 DIAGNOSIS — Z12.5 SCREENING FOR PROSTATE CANCER: ICD-10-CM

## 2022-03-28 DIAGNOSIS — N13.8 BPH WITH URINARY OBSTRUCTION: ICD-10-CM

## 2022-03-28 DIAGNOSIS — E66.3 OVERWEIGHT: ICD-10-CM

## 2022-03-28 DIAGNOSIS — Z00.00 MEDICARE ANNUAL WELLNESS VISIT, INITIAL: Primary | ICD-10-CM

## 2022-03-28 DIAGNOSIS — L91.8 SKIN TAGS, MULTIPLE ACQUIRED: ICD-10-CM

## 2022-03-28 DIAGNOSIS — I10 ESSENTIAL HYPERTENSION: ICD-10-CM

## 2022-03-28 PROBLEM — R97.20 ELEVATED PROSTATE SPECIFIC ANTIGEN (PSA): Status: RESOLVED | Noted: 2021-06-14 | Resolved: 2022-03-28

## 2022-03-28 PROCEDURE — 99214 OFFICE O/P EST MOD 30 MIN: CPT | Performed by: FAMILY MEDICINE

## 2022-03-28 PROCEDURE — G0402 INITIAL PREVENTIVE EXAM: HCPCS | Performed by: FAMILY MEDICINE

## 2022-03-28 PROCEDURE — 1123F ACP DISCUSS/DSCN MKR DOCD: CPT | Performed by: FAMILY MEDICINE

## 2022-03-28 RX ORDER — DIPHENOXYLATE HYDROCHLORIDE AND ATROPINE SULFATE 2.5; .025 MG/1; MG/1
1 TABLET ORAL DAILY
COMMUNITY

## 2022-03-28 NOTE — PROGRESS NOTES
Assessment/Plan:    Medicare annual wellness visit, initial  Patient to have PSA done Patient to revise his advanced directive and review and get me a copy No ECG done today as he sees cardiology    Essential hypertension  Blood pressure is well controlled here and at home Patient to lucy current meds and followup with me in 6months and cardiology and neurology as scheduled    Screening for prostate cancer  Check PSA    Overweight  Continue to work on diet and weight loss I will see him in 6 months     Obstructive sleep apnea syndrome  Feeling well on CPAP continue and followup with sleep medicine    BPH with urinary obstruction  Continue flomax    TIA (transient ischemic attack)  Keep appt with neurology and also with cardiology as there is concern of shunt on ECHO continue eliquis    Mixed hyperlipidemia  Patient is tolerating his statin medication He will have repeat labs LDL goal <80    Skin tags, multiple acquired  Discussed cosmetic in nature patient will think about if he wants them removed       Diagnoses and all orders for this visit:    Medicare annual wellness visit, initial    TIA (transient ischemic attack)  -     apixaban (Eliquis) 5 mg; Take 1 tablet (5 mg total) by mouth 2 (two) times a day  -     Comprehensive metabolic panel; Future  -     Lipid panel; Future    Screening for prostate cancer  -     PSA, Total Screen; Future    Essential hypertension  -     Comprehensive metabolic panel; Future  -     Lipid panel; Future    Obstructive sleep apnea syndrome    Mixed hyperlipidemia  -     Comprehensive metabolic panel; Future  -     Lipid panel; Future    Overweight    BPH with urinary obstruction    Skin tags, multiple acquired    Other orders  -     multivitamin (THERAGRAN) TABS; Take 1 tablet by mouth daily          Subjective:      Patient ID: Autumn Fulton is a 73 y o  male      Patient is ehre for followup of hypertension hyperlipidemia history of TIA sleep apnea and BPH Patient is overall doing ok He does note his memory is not as good as it was pre TIA He has neurology appt His Echo showed possible shunt and he has appt with cardiology He continues on eliquis Patient blood pressure is controlled Home reading all 120's Patient is on lipid meds and doing well repeat labs in June Patient is using cpap and sleeping well Patient to continue to followup with sleep medicine Liborio doing well on flomax He has 2 skin tags he would like looked at Patient is back to his normal exercise       The following portions of the patient's history were reviewed and updated as appropriate: allergies, current medications, past family history, past medical history, past social history, past surgical history and problem list     Review of Systems   Constitutional: Negative for fatigue, fever and unexpected weight change  HENT: Negative for congestion, sinus pain and trouble swallowing  Eyes: Negative for discharge and visual disturbance  Respiratory: Negative for cough, chest tightness, shortness of breath and wheezing  Cardiovascular: Negative for chest pain, palpitations and leg swelling  Gastrointestinal: Negative for abdominal pain, blood in stool, constipation, diarrhea, nausea and vomiting  Genitourinary: Negative for difficulty urinating, dysuria, frequency and hematuria  Musculoskeletal: Negative for arthralgias, gait problem and joint swelling  Skin: Negative for rash and wound  Skin tags right inner thigh   Allergic/Immunologic: Negative for environmental allergies and food allergies  Neurological: Negative for dizziness, syncope, weakness, numbness and headaches  Hematological: Negative for adenopathy  Does not bruise/bleed easily  Psychiatric/Behavioral: Negative for confusion, decreased concentration and sleep disturbance  The patient is not nervous/anxious           Patient feels memory is not as sharp as it was pre TIA He has neurology appt to discuss         Objective:      BP 120/78   Temp 97 8 °F (36 6 °C)   Ht 5' 7" (1 702 m)   Wt 82 6 kg (182 lb)   BMI 28 51 kg/m²          Physical Exam  Vitals and nursing note reviewed  Constitutional:       Appearance: Normal appearance  He is well-developed  HENT:      Head: Normocephalic and atraumatic  Right Ear: Hearing, tympanic membrane and external ear normal       Left Ear: Hearing, tympanic membrane and external ear normal    Eyes:      Extraocular Movements: Extraocular movements intact  Conjunctiva/sclera: Conjunctivae normal       Pupils: Pupils are equal, round, and reactive to light  Neck:      Thyroid: No thyromegaly  Cardiovascular:      Rate and Rhythm: Normal rate and regular rhythm  Heart sounds: Normal heart sounds  Pulmonary:      Effort: Pulmonary effort is normal       Breath sounds: Normal breath sounds  No wheezing or rales  Abdominal:      General: Bowel sounds are normal  There is no distension  Palpations: Abdomen is soft  Tenderness: There is no abdominal tenderness  Musculoskeletal:         General: No tenderness  Cervical back: Neck supple  Lymphadenopathy:      Cervical: No cervical adenopathy  Skin:     General: Skin is warm and dry  Findings: Lesion present  No rash  Comments: 2 skin tags right inner thigh about 10 mm in size   Neurological:      General: No focal deficit present  Mental Status: He is alert and oriented to person, place, and time  Cranial Nerves: No cranial nerve deficit  Coordination: Coordination normal    Psychiatric:         Mood and Affect: Mood normal          Behavior: Behavior normal          Thought Content: Thought content normal          Judgment: Judgment normal        BMI Counseling: Body mass index is 28 51 kg/m²   The BMI is above normal  Nutrition recommendations include reducing portion sizes, decreasing overall calorie intake, 3-5 servings of fruits/vegetables daily, moderation in carbohydrate intake and increasing intake of lean protein  Exercise recommendations include exercising 3-5 times per week

## 2022-03-28 NOTE — ASSESSMENT & PLAN NOTE
Blood pressure is well controlled here and at home Patient to lucy current meds and followup with me in 6months and cardiology and neurology as scheduled

## 2022-03-28 NOTE — PROGRESS NOTES
Assessment and Plan:     Problem List Items Addressed This Visit        Cardiovascular and Mediastinum    TIA (transient ischemic attack)    Relevant Medications    apixaban (Eliquis) 5 mg       Other    Screening for prostate cancer    Relevant Orders    PSA, Total Screen    Medicare annual wellness visit, initial - Primary     Patient to have PSA done Patient to revise his advanced directive and review and get me a copy No ECG done today as he sees cardiology                Preventive health issues were discussed with patient, and age appropriate screening tests were ordered as noted in patient's After Visit Summary  Personalized health advice and appropriate referrals for health education or preventive services given if needed, as noted in patient's After Visit Summary  History of Present Illness:     Patient presents for Medicare Annual Wellness visit    Patient Care Team:  Francy Ferreira DO as PCP - General (Family Medicine)  Francy Ferreira DO as PCP - PCP-[facility]  Bhakti Bartholomew MD     Problem List:     Patient Active Problem List   Diagnosis    Essential hypertension    Nocturia    Screening for prostate cancer    Overweight    Annual physical exam    Obstructive sleep apnea syndrome    Elevated prostate specific antigen (PSA)    BPH with urinary obstruction    TIA (transient ischemic attack)    Mixed hyperlipidemia    Medicare annual wellness visit, initial      Past Medical and Surgical History:     Past Medical History:   Diagnosis Date    Hyperglycemia     Resolved 3/31/2016     Hypertension      Past Surgical History:   Procedure Laterality Date    COLONOSCOPY      Proctitis, otherwise nml  Dr Ryder Browne   Resolved 12/3/2008       Family History:     Family History   Problem Relation Age of Onset    Dementia Mother     Stroke Father     Hypertension Father     No Known Problems Sister     No Known Problems Daughter     No Known Problems Daughter     No Known Problems Sister     No Known Problems Sister     No Known Problems Sister       Social History:     Social History     Socioeconomic History    Marital status: /Civil Union     Spouse name: None    Number of children: None    Years of education: None    Highest education level: None   Occupational History    None   Tobacco Use    Smoking status: Never Smoker    Smokeless tobacco: Never Used   Vaping Use    Vaping Use: Never used   Substance and Sexual Activity    Alcohol use: Yes     Comment: Social     Drug use: Never    Sexual activity: Yes     Partners: Female     Birth control/protection: Post-menopausal     Comment: 2 kids 35 and 32   Other Topics Concern    None   Social History Narrative    None     Social Determinants of Health     Financial Resource Strain: Not on file   Food Insecurity: Not on file   Transportation Needs: Not on file   Physical Activity: Not on file   Stress: Not on file   Social Connections: Not on file   Intimate Partner Violence: Not on file   Housing Stability: Not on file      Medications and Allergies:     Current Outpatient Medications   Medication Sig Dispense Refill    aspirin 81 mg chewable tablet Chew 81 mg daily      atorvastatin (LIPITOR) 40 mg tablet Take 1 tablet (40 mg total) by mouth daily 30 tablet 3    lisinopril (ZESTRIL) 5 mg tablet Take 1 tablet (5 mg total) by mouth daily 90 tablet 1    tamsulosin (FLOMAX) 0 4 mg Take 1 capsule (0 4 mg total) by mouth daily with dinner 90 capsule 1    apixaban (Eliquis) 5 mg Take 1 tablet (5 mg total) by mouth 2 (two) times a day 180 tablet 0    multivitamin (THERAGRAN) TABS Take 1 tablet by mouth daily       No current facility-administered medications for this visit       No Known Allergies   Immunizations:     Immunization History   Administered Date(s) Administered    COVID-19 PFIZER VACCINE 0 3 ML IM 03/27/2021, 04/17/2021, 11/29/2021    DT (pediatric) 11/27/2007    Hepatitis A 12/23/1996    Hepatitis B 04/24/1996    INFLUENZA 11/04/2021    IPV 07/01/2001    Influenza, injectable, quadrivalent, preservative free 0 5 mL 11/08/2019, 09/22/2020    Td (adult), adsorbed 11/03/2007    Tdap 05/24/2019    Typhoid, Unspecified 05/01/1998, 10/25/2016    Zoster Vaccine Recombinant 10/15/2020, 12/24/2020      Health Maintenance:         Topic Date Due    HIV Screening  03/28[birth year]2024 (Originally 5/22/1970)    Colorectal Cancer Screening  08/13/2023    Hepatitis C Screening  Completed     There are no preventive care reminders to display for this patient  Medicare Health Risk Assessment:     /78   Temp 97 8 °F (36 6 °C)   Ht 5' 7" (1 702 m)   Wt 82 6 kg (182 lb)   BMI 28 51 kg/m²      Alden Whitaker is here for his Initial Wellness visit  Health Risk Assessment:   Patient rates overall health as very good  Patient feels that their physical health rating is same  Patient is satisfied with their life  Eyesight was rated as same  Hearing was rated as same  Patient feels that their emotional and mental health rating is same  Patients states they are never, rarely angry  Patient states they are never, rarely unusually tired/fatigued  Pain experienced in the last 7 days has been none  Patient states that he has experienced no weight loss or gain in last 6 months  Depression Screening:   PHQ-2 Score: 0      Fall Risk Screening: In the past year, patient has experienced: no history of falling in past year      Home Safety:  Patient does not have trouble with stairs inside or outside of their home  Patient has working smoke alarms and has no working carbon monoxide detector  Home safety hazards include: none  Nutrition:   Current diet is Regular  Medications:   Patient is currently taking over-the-counter supplements  OTC medications include: see medication list  Patient is able to manage medications       Activities of Daily Living (ADLs)/Instrumental Activities of Daily Living (IADLs):   Walk and transfer into and out of bed and chair?: Yes  Dress and groom yourself?: Yes    Bathe or shower yourself?: Yes    Feed yourself? Yes  Do your laundry/housekeeping?: Yes  Manage your money, pay your bills and track your expenses?: Yes  Make your own meals?: Yes    Do your own shopping?: Yes    Previous Hospitalizations:   Any hospitalizations or ED visits within the last 12 months?: Yes    How many hospitalizations have you had in the last year?: 1-2    Hospitalization Comments: Hospitalized with TIA    Advance Care Planning:   Living will: Yes    Durable POA for healthcare: Yes    Advanced directive: Yes    Five wishes given: Yes      Comments: Patient does not remember and will revise it    Cognitive Screening:   Provider or family/friend/caregiver concerned regarding cognition?: Yes    Cognition Comments: Has appt with neurology scheduled    PREVENTIVE SCREENINGS      Cardiovascular Screening:    General: Screening Current      Diabetes Screening:     General: Screening Current      Colorectal Cancer Screening:     General: Screening Current      Abdominal Aortic Aneurysm (AAA) Screening:    Risk factors include: age between 73-69 yo        Lung Cancer Screening:     General: Screening Not Indicated      Hepatitis C Screening:    General: Screening Current    Screening, Brief Intervention, and Referral to Treatment (SBIRT)    Screening  Typical number of drinks in a day: 0  Typical number of drinks in a week: 1  Interpretation: Low risk drinking behavior      Single Item Drug Screening:  How often have you used an illegal drug (including marijuana) or a prescription medication for non-medical reasons in the past year? never    Single Item Drug Screen Score: 0  Interpretation: Negative screen for possible drug use disorder      Rona Calle,

## 2022-03-28 NOTE — ASSESSMENT & PLAN NOTE
Keep appt with neurology and also with cardiology as there is concern of shunt on ECHO continue eliquis

## 2022-03-28 NOTE — PATIENT INSTRUCTIONS
Medicare Preventive Visit Patient Instructions  Thank you for completing your Welcome to Medicare Visit or Medicare Annual Wellness Visit today  Your next wellness visit will be due in one year (3/29/2023)  The screening/preventive services that you may require over the next 5-10 years are detailed below  Some tests may not apply to you based off risk factors and/or age  Screening tests ordered at today's visit but not completed yet may show as past due  Also, please note that scanned in results may not display below  Preventive Screenings:  Service Recommendations Previous Testing/Comments   Colorectal Cancer Screening  · Colonoscopy    · Fecal Occult Blood Test (FOBT)/Fecal Immunochemical Test (FIT)  · Fecal DNA/Cologuard Test  · Flexible Sigmoidoscopy Age: 54-65 years old   Colonoscopy: every 10 years (May be performed more frequently if at higher risk)  OR  FOBT/FIT: every 1 year  OR  Cologuard: every 3 years  OR  Sigmoidoscopy: every 5 years  Screening may be recommended earlier than age 48 if at higher risk for colorectal cancer  Also, an individualized decision between you and your healthcare provider will decide whether screening between the ages of 74-80 would be appropriate   Colonoscopy: 08/13/2018  FOBT/FIT: Not on file  Cologuard: Not on file  Sigmoidoscopy: Not on file    Screening Current     Prostate Cancer Screening Individualized decision between patient and health care provider in men between ages of 53-78   Medicare will cover every 12 months beginning on the day after your 50th birthday PSA: 3 6 ng/mL           Hepatitis C Screening Once for adults born between 1945 and 1965  More frequently in patients at high risk for Hepatitis C Hep C Antibody: 04/30/2020    Screening Current   Diabetes Screening 1-2 times per year if you're at risk for diabetes or have pre-diabetes Fasting glucose: No results in last 5 years   A1C: 5 6 %    Screening Current   Cholesterol Screening Once every 5 years if you don't have a lipid disorder  May order more often based on risk factors  Lipid panel: 12/24/2021    Screening Current      Other Preventive Screenings Covered by Medicare:  1  Abdominal Aortic Aneurysm (AAA) Screening: covered once if your at risk  You're considered to be at risk if you have a family history of AAA or a male between the age of 73-68 who smoking at least 100 cigarettes in your lifetime  2  Lung Cancer Screening: covers low dose CT scan once per year if you meet all of the following conditions: (1) Age 50-69; (2) No signs or symptoms of lung cancer; (3) Current smoker or have quit smoking within the last 15 years; (4) You have a tobacco smoking history of at least 30 pack years (packs per day x number of years you smoked); (5) You get a written order from a healthcare provider  3  Glaucoma Screening: covered annually if you're considered high risk: (1) You have diabetes OR (2) Family history of glaucoma OR (3)  aged 48 and older OR (3)  American aged 72 and older  3  Osteoporosis Screening: covered every 2 years if you meet one of the following conditions: (1) Have a vertebral abnormality; (2) On glucocorticoid therapy for more than 3 months; (3) Have primary hyperparathyroidism; (4) On osteoporosis medications and need to assess response to drug therapy  5  HIV Screening: covered annually if you're between the age of 12-76  Also covered annually if you are younger than 13 and older than 72 with risk factors for HIV infection  For pregnant patients, it is covered up to 3 times per pregnancy      Immunizations:  Immunization Recommendations   Influenza Vaccine Annual influenza vaccination during flu season is recommended for all persons aged >= 6 months who do not have contraindications   Pneumococcal Vaccine (Prevnar and Pneumovax)  * Prevnar = PCV13  * Pneumovax = PPSV23 Adults 25-60 years old: 1-3 doses may be recommended based on certain risk factors  Adults 65+ years old: Prevnar (PCV13) vaccine recommended followed by Pneumovax (PPSV23) vaccine  If already received PPSV23 since turning 65, then PCV13 recommended at least one year after PPSV23 dose  Hepatitis B Vaccine 3 dose series if at intermediate or high risk (ex: diabetes, end stage renal disease, liver disease)   Tetanus (Td) Vaccine - COST NOT COVERED BY MEDICARE PART B Following completion of primary series, a booster dose should be given every 10 years to maintain immunity against tetanus  Td may also be given as tetanus wound prophylaxis  Tdap Vaccine - COST NOT COVERED BY MEDICARE PART B Recommended at least once for all adults  For pregnant patients, recommended with each pregnancy  Shingles Vaccine (Shingrix) - COST NOT COVERED BY MEDICARE PART B  2 shot series recommended in those aged 48 and above     Health Maintenance Due:      Topic Date Due    HIV Screening  Never done    Colorectal Cancer Screening  08/13/2023    Hepatitis C Screening  Completed     Immunizations Due:  There are no preventive care reminders to display for this patient  Advance Directives   What are advance directives? Advance directives are legal documents that state your wishes and plans for medical care  These plans are made ahead of time in case you lose your ability to make decisions for yourself  Advance directives can apply to any medical decision, such as the treatments you want, and if you want to donate organs  What are the types of advance directives? There are many types of advance directives, and each state has rules about how to use them  You may choose a combination of any of the following:  · Living will: This is a written record of the treatment you want  You can also choose which treatments you do not want, which to limit, and which to stop at a certain time  This includes surgery, medicine, IV fluid, and tube feedings  · Durable power of  for healthcare [facility]):   This is a written record that states who you want to make healthcare choices for you when you are unable to make them for yourself  This person, called a proxy, is usually a family member or a friend  You may choose more than 1 proxy  · Do not resuscitate (DNR) order:  A DNR order is used in case your heart stops beating or you stop breathing  It is a request not to have certain forms of treatment, such as CPR  A DNR order may be included in other types of advance directives  · Medical directive: This covers the care that you want if you are in a coma, near death, or unable to make decisions for yourself  You can list the treatments you want for each condition  Treatment may include pain medicine, surgery, blood transfusions, dialysis, IV or tube feedings, and a ventilator (breathing machine)  · Values history: This document has questions about your views, beliefs, and how you feel and think about life  This information can help others choose the care that you would choose  Why are advance directives important? An advance directive helps you control your care  Although spoken wishes may be used, it is better to have your wishes written down  Spoken wishes can be misunderstood, or not followed  Treatments may be given even if you do not want them  An advance directive may make it easier for your family to make difficult choices about your care  Weight Management   Why it is important to manage your weight:  Being overweight increases your risk of health conditions such as heart disease, high blood pressure, type 2 diabetes, and certain types of cancer  It can also increase your risk for osteoarthritis, sleep apnea, and other respiratory problems  Aim for a slow, steady weight loss  Even a small amount of weight loss can lower your risk of health problems  How to lose weight safely:  A safe and healthy way to lose weight is to eat fewer calories and get regular exercise   You can lose up about 1 pound a week by decreasing the number of calories you eat by 500 calories each day  Healthy meal plan for weight management:  A healthy meal plan includes a variety of foods, contains fewer calories, and helps you stay healthy  A healthy meal plan includes the following:  · Eat whole-grain foods more often  A healthy meal plan should contain fiber  Fiber is the part of grains, fruits, and vegetables that is not broken down by your body  Whole-grain foods are healthy and provide extra fiber in your diet  Some examples of whole-grain foods are whole-wheat breads and pastas, oatmeal, brown rice, and bulgur  · Eat a variety of vegetables every day  Include dark, leafy greens such as spinach, kale, nani greens, and mustard greens  Eat yellow and orange vegetables such as carrots, sweet potatoes, and winter squash  · Eat a variety of fruits every day  Choose fresh or canned fruit (canned in its own juice or light syrup) instead of juice  Fruit juice has very little or no fiber  · Eat low-fat dairy foods  Drink fat-free (skim) milk or 1% milk  Eat fat-free yogurt and low-fat cottage cheese  Try low-fat cheeses such as mozzarella and other reduced-fat cheeses  · Choose meat and other protein foods that are low in fat  Choose beans or other legumes such as split peas or lentils  Choose fish, skinless poultry (chicken or turkey), or lean cuts of red meat (beef or pork)  Before you cook meat or poultry, cut off any visible fat  · Use less fat and oil  Try baking foods instead of frying them  Add less fat, such as margarine, sour cream, regular salad dressing and mayonnaise to foods  Eat fewer high-fat foods  Some examples of high-fat foods include french fries, doughnuts, ice cream, and cakes  · Eat fewer sweets  Limit foods and drinks that are high in sugar  This includes candy, cookies, regular soda, and sweetened drinks  Exercise:  Exercise at least 30 minutes per day on most days of the week   Some examples of exercise include walking, biking, dancing, and swimming  You can also fit in more physical activity by taking the stairs instead of the elevator or parking farther away from stores  Ask your healthcare provider about the best exercise plan for you  © Copyright [employer] 2018 Information is for End User's use only and may not be sold, redistributed or otherwise used for commercial purposes  All illustrations and images included in CareNotes® are the copyrighted property of [employer]  or [facility] Preventive Visit Patient Instructions  Thank you for completing your Welcome to Medicare Visit or Medicare Annual Wellness Visit today  Your next wellness visit will be due in one year (3/29/2023)  The screening/preventive services that you may require over the next 5-10 years are detailed below  Some tests may not apply to you based off risk factors and/or age  Screening tests ordered at today's visit but not completed yet may show as past due  Also, please note that scanned in results may not display below  Preventive Screenings:  Service Recommendations Previous Testing/Comments   Colorectal Cancer Screening  · Colonoscopy    · Fecal Occult Blood Test (FOBT)/Fecal Immunochemical Test (FIT)  · Fecal DNA/Cologuard Test  · Flexible Sigmoidoscopy Age: 54-65 years old   Colonoscopy: every 10 years (May be performed more frequently if at higher risk)  OR  FOBT/FIT: every 1 year  OR  Cologuard: every 3 years  OR  Sigmoidoscopy: every 5 years  Screening may be recommended earlier than age 48 if at higher risk for colorectal cancer  Also, an individualized decision between you and your healthcare provider will decide whether screening between the ages of 74-80 would be appropriate   Colonoscopy: 08/13/2018  FOBT/FIT: Not on file  Cologuard: Not on file  Sigmoidoscopy: Not on file    Screening Current     Prostate Cancer Screening Individualized decision between patient and health care provider in men between ages of 53-78   Medicare will cover every 12 months beginning on the day after your 50th birthday PSA: 3 6 ng/mL           Hepatitis C Screening Once for adults born between 1945 and 1965  More frequently in patients at high risk for Hepatitis C Hep C Antibody: 04/30/2020    Screening Current   Diabetes Screening 1-2 times per year if you're at risk for diabetes or have pre-diabetes Fasting glucose: No results in last 5 years   A1C: 5 6 %    Screening Current   Cholesterol Screening Once every 5 years if you don't have a lipid disorder  May order more often based on risk factors  Lipid panel: 12/24/2021    Screening Current      Other Preventive Screenings Covered by Medicare:  6  Abdominal Aortic Aneurysm (AAA) Screening: covered once if your at risk  You're considered to be at risk if you have a family history of AAA or a male between the age of 73-68 who smoking at least 100 cigarettes in your lifetime  7  Lung Cancer Screening: covers low dose CT scan once per year if you meet all of the following conditions: (1) Age 50-69; (2) No signs or symptoms of lung cancer; (3) Current smoker or have quit smoking within the last 15 years; (4) You have a tobacco smoking history of at least 30 pack years (packs per day x number of years you smoked); (5) You get a written order from a healthcare provider  8  Glaucoma Screening: covered annually if you're considered high risk: (1) You have diabetes OR (2) Family history of glaucoma OR (3)  aged 48 and older OR (3)  American aged 72 and older  5  Osteoporosis Screening: covered every 2 years if you meet one of the following conditions: (1) Have a vertebral abnormality; (2) On glucocorticoid therapy for more than 3 months; (3) Have primary hyperparathyroidism; (4) On osteoporosis medications and need to assess response to drug therapy  10  HIV Screening: covered annually if you're between the age of 12-76   Also covered annually if you are younger than 13 and older than 72 with risk factors for HIV infection  For pregnant patients, it is covered up to 3 times per pregnancy  Immunizations:  Immunization Recommendations   Influenza Vaccine Annual influenza vaccination during flu season is recommended for all persons aged >= 6 months who do not have contraindications   Pneumococcal Vaccine (Prevnar and Pneumovax)  * Prevnar = PCV13  * Pneumovax = PPSV23 Adults 25-60 years old: 1-3 doses may be recommended based on certain risk factors  Adults 72 years old: Prevnar (PCV13) vaccine recommended followed by Pneumovax (PPSV23) vaccine  If already received PPSV23 since turning 65, then PCV13 recommended at least one year after PPSV23 dose  Hepatitis B Vaccine 3 dose series if at intermediate or high risk (ex: diabetes, end stage renal disease, liver disease)   Tetanus (Td) Vaccine - COST NOT COVERED BY MEDICARE PART B Following completion of primary series, a booster dose should be given every 10 years to maintain immunity against tetanus  Td may also be given as tetanus wound prophylaxis  Tdap Vaccine - COST NOT COVERED BY MEDICARE PART B Recommended at least once for all adults  For pregnant patients, recommended with each pregnancy  Shingles Vaccine (Shingrix) - COST NOT COVERED BY MEDICARE PART B  2 shot series recommended in those aged 48 and above     Health Maintenance Due:      Topic Date Due    HIV Screening  03/28/2024 (Originally 5/22/1970)    Colorectal Cancer Screening  08/13/2023    Hepatitis C Screening  Completed     Immunizations Due:  There are no preventive care reminders to display for this patient  Advance Directives   What are advance directives? Advance directives are legal documents that state your wishes and plans for medical care  These plans are made ahead of time in case you lose your ability to make decisions for yourself  Advance directives can apply to any medical decision, such as the treatments you want, and if you want to donate organs     What are the types of advance directives? There are many types of advance directives, and each state has rules about how to use them  You may choose a combination of any of the following:  · Living will: This is a written record of the treatment you want  You can also choose which treatments you do not want, which to limit, and which to stop at a certain time  This includes surgery, medicine, IV fluid, and tube feedings  · Durable power of  for healthcare [facility]): This is a written record that states who you want to make healthcare choices for you when you are unable to make them for yourself  This person, called a proxy, is usually a family member or a friend  You may choose more than 1 proxy  · Do not resuscitate (DNR) order:  A DNR order is used in case your heart stops beating or you stop breathing  It is a request not to have certain forms of treatment, such as CPR  A DNR order may be included in other types of advance directives  · Medical directive: This covers the care that you want if you are in a coma, near death, or unable to make decisions for yourself  You can list the treatments you want for each condition  Treatment may include pain medicine, surgery, blood transfusions, dialysis, IV or tube feedings, and a ventilator (breathing machine)  · Values history: This document has questions about your views, beliefs, and how you feel and think about life  This information can help others choose the care that you would choose  Why are advance directives important? An advance directive helps you control your care  Although spoken wishes may be used, it is better to have your wishes written down  Spoken wishes can be misunderstood, or not followed  Treatments may be given even if you do not want them  An advance directive may make it easier for your family to make difficult choices about your care     Weight Management   Why it is important to manage your weight:  Being overweight increases your risk of health conditions such as heart disease, high blood pressure, type 2 diabetes, and certain types of cancer  It can also increase your risk for osteoarthritis, sleep apnea, and other respiratory problems  Aim for a slow, steady weight loss  Even a small amount of weight loss can lower your risk of health problems  How to lose weight safely:  A safe and healthy way to lose weight is to eat fewer calories and get regular exercise  You can lose up about 1 pound a week by decreasing the number of calories you eat by 500 calories each day  Healthy meal plan for weight management:  A healthy meal plan includes a variety of foods, contains fewer calories, and helps you stay healthy  A healthy meal plan includes the following:  · Eat whole-grain foods more often  A healthy meal plan should contain fiber  Fiber is the part of grains, fruits, and vegetables that is not broken down by your body  Whole-grain foods are healthy and provide extra fiber in your diet  Some examples of whole-grain foods are whole-wheat breads and pastas, oatmeal, brown rice, and bulgur  · Eat a variety of vegetables every day  Include dark, leafy greens such as spinach, kale, nani greens, and mustard greens  Eat yellow and orange vegetables such as carrots, sweet potatoes, and winter squash  · Eat a variety of fruits every day  Choose fresh or canned fruit (canned in its own juice or light syrup) instead of juice  Fruit juice has very little or no fiber  · Eat low-fat dairy foods  Drink fat-free (skim) milk or 1% milk  Eat fat-free yogurt and low-fat cottage cheese  Try low-fat cheeses such as mozzarella and other reduced-fat cheeses  · Choose meat and other protein foods that are low in fat  Choose beans or other legumes such as split peas or lentils  Choose fish, skinless poultry (chicken or turkey), or lean cuts of red meat (beef or pork)  Before you cook meat or poultry, cut off any visible fat  · Use less fat and oil    Try baking foods instead of frying them  Add less fat, such as margarine, sour cream, regular salad dressing and mayonnaise to foods  Eat fewer high-fat foods  Some examples of high-fat foods include french fries, doughnuts, ice cream, and cakes  · Eat fewer sweets  Limit foods and drinks that are high in sugar  This includes candy, cookies, regular soda, and sweetened drinks  Exercise:  Exercise at least 30 minutes per day on most days of the week  Some examples of exercise include walking, biking, dancing, and swimming  You can also fit in more physical activity by taking the stairs instead of the elevator or parking farther away from stores  Ask your healthcare provider about the best exercise plan for you  © Copyright [employer] 2018 Information is for End User's use only and may not be sold, redistributed or otherwise used for commercial purposes  All illustrations and images included in CareNotes® are the copyrighted property of [employer]  or [facility] Anupama Murillo   Weight Management   AMBULATORY CARE:   Why it is important to manage your weight:  Being overweight increases your risk of health conditions such as heart disease, high blood pressure, type 2 diabetes, and certain types of cancer  It can also increase your risk for osteoarthritis, sleep apnea, and other respiratory problems  Aim for a slow, steady weight loss  Even a small amount of weight loss can lower your risk of health problems  Risks of being overweight:  Extra weight can cause many health problems, including the following:  · Diabetes (high blood sugar level)    · High blood pressure or high cholesterol    · Heart disease    · Stroke    · Gallbladder or liver disease    · Cancer of the colon, breast, prostate, liver, or kidney    · Sleep apnea    · Arthritis or gout    Screening  is done to check for health conditions before you have signs or symptoms   If you are 28to 79years old, your blood sugar level may be checked every 3 years for signs of prediabetes or diabetes  Your healthcare provider will check your blood pressure at each visit  High blood pressure can lead to a stroke or other problems  Your provider may check for signs of heart disease, cancer, or other health problems  How to lose weight safely:  A safe and healthy way to lose weight is to eat fewer calories and get regular exercise  · You can lose up about 1 pound a week by decreasing the number of calories you eat by 500 calories each day  You can decrease calories by eating smaller portion sizes or by cutting out high-calorie foods  Read labels to find out how many calories are in the foods you eat  · You can also burn calories with exercise such as walking, swimming, or biking  You will be more likely to keep weight off if you make these changes part of your lifestyle  Exercise at least 30 minutes per day on most days of the week  You can also fit in more physical activity by taking the stairs instead of the elevator or parking farther away from stores  Ask your healthcare provider about the best exercise plan for you  Healthy meal plan for weight management:  A healthy meal plan includes a variety of foods, contains fewer calories, and helps you stay healthy  A healthy meal plan includes the following:     · Eat whole-grain foods more often  A healthy meal plan should contain fiber  Fiber is the part of grains, fruits, and vegetables that is not broken down by your body  Whole-grain foods are healthy and provide extra fiber in your diet  Some examples of whole-grain foods are whole-wheat breads and pastas, oatmeal, brown rice, and bulgur  · Eat a variety of vegetables every day  Include dark, leafy greens such as spinach, kale, nani greens, and mustard greens  Eat yellow and orange vegetables such as carrots, sweet potatoes, and winter squash  · Eat a variety of fruits every day    Choose fresh or canned fruit (canned in its own juice or light syrup) instead of juice  Fruit juice has very little or no fiber  · Eat low-fat dairy foods  Drink fat-free (skim) milk or 1% milk  Eat fat-free yogurt and low-fat cottage cheese  Try low-fat cheeses such as mozzarella and other reduced-fat cheeses  · Choose meat and other protein foods that are low in fat  Choose beans or other legumes such as split peas or lentils  Choose fish, skinless poultry (chicken or turkey), or lean cuts of red meat (beef or pork)  Before you cook meat or poultry, cut off any visible fat  · Use less fat and oil  Try baking foods instead of frying them  Add less fat, such as margarine, sour cream, regular salad dressing and mayonnaise to foods  Eat fewer high-fat foods  Some examples of high-fat foods include french fries, doughnuts, ice cream, and cakes  · Eat fewer sweets  Limit foods and drinks that are high in sugar  This includes candy, cookies, regular soda, and sweetened drinks  Ways to decrease calories:   · Eat smaller portions  ? Use a small plate with smaller servings  ? Do not eat second helpings  ? When you eat at a restaurant, ask for a box and place half of your meal in the box before you eat  ? Share an entrée with someone else  · Replace high-calorie snacks with healthy, low-calorie snacks  ? Choose fresh fruit, vegetables, fat-free rice cakes, or air-popped popcorn instead of potato chips, nuts, or chocolate  ? Choose water or calorie-free drinks instead of soda or sweetened drinks  · Do not shop for groceries when you are hungry  You may be more likely to make unhealthy food choices  Take a grocery list of healthy foods and shop after you have eaten  · Eat regular meals  Do not skip meals  Skipping meals can lead to overeating later in the day  This can make it harder for you to lose weight   Eat a healthy snack in place of a meal if you do not have time to eat a regular meal  Talk with a dietitian to help you create a meal plan and schedule that is right for you  Other things to consider as you try to lose weight:   · Be aware of situations that may give you the urge to overeat, such as eating while watching television  Find ways to avoid these situations  For example, read a book, go for a walk, or do crafts  · Meet with a weight loss support group or friends who are also trying to lose weight  This may help you stay motivated to continue working on your weight loss goals  © Copyright [employer] 2022 Information is for End User's use only and may not be sold, redistributed or otherwise used for commercial purposes  All illustrations and images included in CareNotes® are the copyrighted property of A D A M , Inc  or [facility] Anupama Murillo   The above information is an  only  It is not intended as medical advice for individual conditions or treatments  Talk to your doctor, nurse or pharmacist before following any medical regimen to see if it is safe and effective for you

## 2022-03-28 NOTE — ASSESSMENT & PLAN NOTE
Patient to have PSA done Patient to revise his advanced directive and review and get me a copy No ECG done today as he sees cardiology

## 2022-04-18 DIAGNOSIS — I10 ESSENTIAL HYPERTENSION: ICD-10-CM

## 2022-04-18 RX ORDER — ATORVASTATIN CALCIUM 40 MG/1
40 TABLET, FILM COATED ORAL DAILY
Qty: 90 TABLET | Refills: 1 | Status: SHIPPED | OUTPATIENT
Start: 2022-04-18 | End: 2023-04-18

## 2022-06-22 ENCOUNTER — TRANSITIONAL CARE MANAGEMENT (OUTPATIENT)
Dept: FAMILY MEDICINE CLINIC | Facility: CLINIC | Age: 67
End: 2022-06-22

## 2022-06-28 ENCOUNTER — OFFICE VISIT (OUTPATIENT)
Dept: FAMILY MEDICINE CLINIC | Facility: CLINIC | Age: 67
End: 2022-06-28
Payer: MEDICARE

## 2022-06-28 VITALS
SYSTOLIC BLOOD PRESSURE: 122 MMHG | DIASTOLIC BLOOD PRESSURE: 80 MMHG | HEIGHT: 67 IN | BODY MASS INDEX: 28.72 KG/M2 | TEMPERATURE: 97.8 F | WEIGHT: 183 LBS

## 2022-06-28 DIAGNOSIS — E78.2 MIXED HYPERLIPIDEMIA: ICD-10-CM

## 2022-06-28 DIAGNOSIS — G45.9 TIA (TRANSIENT ISCHEMIC ATTACK): Primary | ICD-10-CM

## 2022-06-28 DIAGNOSIS — R41.3 MEMORY LOSS: ICD-10-CM

## 2022-06-28 DIAGNOSIS — Q21.1 PATENT FORAMEN OVALE WITH RIGHT TO LEFT SHUNT: ICD-10-CM

## 2022-06-28 DIAGNOSIS — N13.8 BPH WITH URINARY OBSTRUCTION: ICD-10-CM

## 2022-06-28 DIAGNOSIS — R41.89 COGNITIVE AND BEHAVIORAL CHANGES: ICD-10-CM

## 2022-06-28 DIAGNOSIS — G47.33 OBSTRUCTIVE SLEEP APNEA SYNDROME: ICD-10-CM

## 2022-06-28 DIAGNOSIS — N40.1 BPH WITH URINARY OBSTRUCTION: ICD-10-CM

## 2022-06-28 DIAGNOSIS — R46.89 COGNITIVE AND BEHAVIORAL CHANGES: ICD-10-CM

## 2022-06-28 PROBLEM — Q21.12 PATENT FORAMEN OVALE WITH RIGHT TO LEFT SHUNT: Status: ACTIVE | Noted: 2022-05-20

## 2022-06-28 PROCEDURE — 99496 TRANSJ CARE MGMT HIGH F2F 7D: CPT | Performed by: FAMILY MEDICINE

## 2022-06-28 NOTE — PATIENT INSTRUCTIONS
Cholesterol and Your Health   WHAT YOU NEED TO KNOW:   What is cholesterol? Cholesterol is a waxy, fat-like substance  Your body uses cholesterol to make hormones and new cells, and to protect nerves  Cholesterol is made by your body  It also comes from certain foods you eat, such as meat and dairy products  Your healthcare provider can help you set goals for your cholesterol levels  He or she can help you create a plan to meet your goals  What are cholesterol level goals? Your cholesterol level goals depend on your risk for heart disease, your age, and your other health conditions  The following are general guidelines: Total cholesterol  includes low-density lipoprotein (LDL), high-density lipoprotein (HDL), and triglyceride levels  The total cholesterol level should be lower than 200 mg/dL and is best at about 150 mg/dL  LDL cholesterol  is called bad cholesterol  because it forms plaque in your arteries  As plaque builds up, your arteries become narrow, and less blood flows through  When plaque decreases blood flow to your heart, you may have chest pain  If plaque completely blocks an artery that brings blood to your heart, you may have a heart attack  Plaque can break off and form blood clots  Blood clots may block arteries in your brain and cause a stroke  The level should be less than 130 mg/dL and is best at about 100 mg/dL  HDL cholesterol  is called good cholesterol  because it helps remove LDL cholesterol from your arteries  It does this by attaching to LDL cholesterol and carrying it to your liver  Your liver breaks down LDL cholesterol so your body can get rid of it  High levels of HDL cholesterol can help prevent a heart attack and stroke  Low levels of HDL cholesterol can increase your risk for heart disease, heart attack, and stroke  The level should be 60 mg/dL or higher  Triglycerides  are a type of fat that store energy from foods you eat   High levels of triglycerides also cause plaque buildup  This can increase your risk for a heart attack or stroke  If your triglyceride level is high, your LDL cholesterol level may also be high  The level should be less than 150 mg/dL  What increases my risk for high cholesterol? Smoking cigarettes    Being overweight or obese, or not getting enough exercise    Drinking large amounts of alcohol    A medical condition such as hypertension (high blood pressure) or diabetes    Certain genes passed from your parents to you    Age older than 72 years    What do I need to know about having my cholesterol levels checked? Adults 21to 39years of age should have their cholesterol levels checked every 4 to 6 years  Adults 45 years or older should have their cholesterol checked every 1 to 2 years  You may need your cholesterol checked more often, or at a younger age, if you have risk factors for heart disease  You may also need to have your cholesterol checked more often if you have other health conditions, such as diabetes  Blood tests are used to check cholesterol levels  Blood tests measure your levels of triglycerides, LDL cholesterol, and HDL cholesterol  How do healthy fats affect my cholesterol levels? Healthy fats, also called unsaturated fats, help lower LDL cholesterol and triglyceride levels  Healthy fats include the following:  Monounsaturated fats  are found in foods such as olive oil, canola oil, avocado, nuts, and olives  Polyunsaturated fats,  such as omega 3 fats, are found in fish, such as salmon, trout, and tuna  They can also be found in plant foods such as flaxseed, walnuts, and soybeans  How do unhealthy fats affect my cholesterol levels? Unhealthy fats increase LDL cholesterol and triglyceride levels  They are found in foods high in cholesterol, saturated fat, and trans fat:  Cholesterol  is found in eggs, dairy, and meat  Saturated fat  is found in butter, cheese, ice cream, whole milk, and coconut oil   Saturated fat is also found in meat, such as sausage, hot dogs, and bologna  Trans fat  is found in liquid oils and is used in fried and baked foods  Foods that contain trans fats include chips, crackers, muffins, sweet rolls, microwave popcorn, and cookies  How is high cholesterol treated? Treatment for high cholesterol will also decrease your risk of heart disease, heart attack, and stroke  Treatment may include any of the following:  Lifestyle changes  may include food, exercise, weight loss, and quitting smoking  You may also need to decrease the amount of alcohol you drink  Your healthcare provider will want you to start with lifestyle changes  Other treatment may be added if lifestyle changes are not enough  Your healthcare provider may recommend you work with a team to manage hyperlipidemia  The team may include medical experts such as a dietitian, an exercise or physical therapist, and a behavior therapist  Your family members may be included in helping you create lifestyle changes  Medicines  may be given to lower your LDL cholesterol, triglyceride levels, or total cholesterol level  You may need medicines to lower your cholesterol if any of the following is true:    You have a history of stroke, TIA, unstable angina, or a heart attack  Your LDL cholesterol level is 190 mg/dL or higher  You are age 36 to 76 years, have diabetes or heart disease risk factors, and your LDL cholesterol is 70 mg/dL or higher  Supplements  include fish oil, red yeast rice, and garlic  Fish oil may help lower your triglyceride and LDL cholesterol levels  It may also increase your HDL cholesterol level  Red yeast rice may help decrease your total cholesterol level and LDL cholesterol level  Garlic may help lower your total cholesterol level  Do not take any supplements without talking to your healthcare provider  What food changes can I make to lower my cholesterol levels?   A dietitian can help you create a healthy eating plan  He or she can show you how to read food labels and choose foods low in saturated fat, trans fats, and cholesterol  Decrease the total amount of fat you eat  Choose lean meats, fat-free or 1% fat milk, and low-fat dairy products, such as yogurt and cheese  Try to limit or avoid red meats  Limit or do not eat fried foods or baked goods, such as cookies  Replace unhealthy fats with healthy fats  Cook foods in olive oil or canola oil  Choose soft margarines that are low in saturated fat and trans fat  Seeds, nuts, and avocados are other examples of healthy fats  Eat foods with omega-3 fats  Examples include salmon, tuna, mackerel, walnuts, and flaxseed  Eat fish 2 times per week  Pregnant women should not eat fish that have high levels of mercury, such as shark, swordfish, and cecilia mackerel  Increase the amount of high-fiber foods you eat  High-fiber foods can help lower your LDL cholesterol  Aim to get between 20 and 30 grams of fiber each day  Fruits and vegetables are high in fiber  Eat at least 5 servings each day  Other high-fiber foods are whole-grain or whole-wheat breads, pastas, or cereals, and brown rice  Eat 3 ounces of whole-grain foods each day  Increase fiber slowly  You may have abdominal discomfort, bloating, and gas if you add fiber to your diet too quickly  Eat healthy protein foods  Examples include low-fat dairy products, skinless chicken and turkey, fish, and nuts  Limit foods and drinks that are high in sugar  Your dietitian or healthcare provider can help you create daily limits for high-sugar foods and drinks  The limit may be lower if you have diabetes or another health condition  Limits can also help you lose weight if needed  What lifestyle changes can I make to lower my cholesterol levels? Maintain a healthy weight  Ask your healthcare provider what a healthy weight is for you  Ask him or her to help you create a weight loss plan if needed  Weight loss can decrease your total cholesterol and triglyceride levels  Weight loss may also help keep your blood pressure at a healthy level  Be physically active throughout the day  Physical activity, such as exercise, can help lower your total cholesterol level and maintain a healthy weight  Physical activity can also help increase your HDL cholesterol level  Work with your healthcare provider to create an program that is right for you  Get at least 30 to 40 minutes of moderate physical activity most days of the week  Examples of exercise include brisk walking, swimming, or biking  Also include strength training at least 2 times each week  Your healthcare providers can help you create a physical activity plan  Do not smoke  Nicotine and other chemicals in cigarettes and cigars can raise your cholesterol levels  Ask your healthcare provider for information if you currently smoke and need help to quit  E-cigarettes or smokeless tobacco still contain nicotine  Talk to your healthcare provider before you use these products  Limit or do not drink alcohol  Alcohol can increase your triglyceride levels  Ask your healthcare provider before you drink alcohol  Ask how much is okay for you to drink in 24 hours or 1 week  CARE AGREEMENT:   You have the right to help plan your care  Discuss treatment options with your healthcare provider to decide what care you want to receive  You always have the right to refuse treatment  The above information is an  only  It is not intended as medical advice for individual conditions or treatments  Talk to your doctor, nurse or pharmacist before following any medical regimen to see if it is safe and effective for you  © Copyright [address] 2022 Information is for End User's use only and may not be sold, redistributed or otherwise used for commercial purposes   All illustrations and images included in CareNotes® are the copyrighted property of A  D A M , Inc  or [address] [place] [address]

## 2022-06-28 NOTE — PROGRESS NOTES
Assessment/Plan:     Obstructive sleep apnea syndrome  Continue CPAP    BPH with urinary obstruction  Stable on flomax     TIA (transient ischemic attack)  Resolved Reviewed the neurology cardiology consults plyus the device site    Mixed hyperlipidemia  Lipids stable continue meds    Cognitive and behavioral changes  Refer back to neuro    Memory loss  Patient to see neuro       Diagnoses and all orders for this visit:    TIA (transient ischemic attack)    Patent foramen ovale with right to left shunt    Cognitive and behavioral changes    Memory loss    Obstructive sleep apnea syndrome    Mixed hyperlipidemia    BPH with urinary obstruction         Subjective:     Patient ID: Gordo Vuong is a 78 y o  male  Patient is here or TCM for hospital stay overnight on 6/20/2022  Patient  Had a TIA on 12/23/2021 Patient had echo showed possible small PFO with left to right shunt ABRIL also done showed possible shunt Patient cardiologist felt he needed a loop recorder to determine if atrial fib or PFO was cause of stroke Patient had the device placed on 6/20/2022 Patient to have afollowup in fall He ha not had followup with the neurologist about his memory He feels his memory was off prior to TIA and his dad had alzheimer's dementia Patient blood pressure is well cotnrolled His urination is stable He  has been working on the [employer] the neurocardiologist suggest neurology memory eval Patient has no chest pian and shorntess of breath He has no palpitations He is on eliquis and aspirin      Review of Systems   Constitutional: Negative for fatigue, fever and unexpected weight change  HENT: Negative for congestion, sinus pain and trouble swallowing  Eyes: Negative for discharge and visual disturbance  Respiratory: Negative for cough, chest tightness, shortness of breath and wheezing  Cardiovascular: Negative for chest pain, palpitations and leg swelling     Gastrointestinal: Negative for abdominal pain, blood in stool, constipation, diarrhea, nausea and vomiting  Genitourinary: Negative for difficulty urinating, dysuria, frequency and hematuria  Musculoskeletal: Negative for arthralgias, gait problem and joint swelling  Skin: Negative for rash and wound  Allergic/Immunologic: Negative for environmental allergies and food allergies  Neurological: Negative for dizziness, syncope, weakness, numbness and headaches  Hematological: Negative for adenopathy  Does not bruise/bleed easily  Psychiatric/Behavioral: Positive for decreased concentration  Negative for confusion and sleep disturbance  The patient is not nervous/anxious  Objective:     Physical Exam  Vitals and nursing note reviewed  Constitutional:       Appearance: He is well-developed  HENT:      Head: Normocephalic and atraumatic  Right Ear: Hearing, tympanic membrane and external ear normal       Left Ear: Hearing, tympanic membrane and external ear normal       Mouth/Throat:      Mouth: Mucous membranes are dry  Eyes:      Extraocular Movements: Extraocular movements intact  Conjunctiva/sclera: Conjunctivae normal       Pupils: Pupils are equal, round, and reactive to light  Neck:      Thyroid: No thyromegaly  Cardiovascular:      Rate and Rhythm: Normal rate and regular rhythm  Heart sounds: Normal heart sounds  Pulmonary:      Effort: Pulmonary effort is normal       Breath sounds: Normal breath sounds  No wheezing or rales  Abdominal:      General: Bowel sounds are normal  There is no distension  Palpations: Abdomen is soft  Tenderness: There is no abdominal tenderness  Musculoskeletal:         General: No tenderness  Cervical back: Neck supple  Lymphadenopathy:      Cervical: No cervical adenopathy  Skin:     General: Skin is warm and dry  Findings: No rash  Neurological:      General: No focal deficit present  Mental Status: He is alert and oriented to person, place, and time  Cranial Nerves: No cranial nerve deficit  Coordination: Coordination normal    Psychiatric:         Mood and Affect: Mood normal          Behavior: Behavior normal          Thought Content: Thought content normal          Judgment: Judgment normal            Vitals:    06/28/22 1326   BP: 122/80   Temp: 97 8 °F (36 6 °C)   Weight: 83 kg (183 lb)   Height: 5' 7" (1 702 m)       Transitional Care Management Review:  Idalia Hansen is a 78 y o  male here for TCM follow up  During the TCM phone call patient stated:    TCM Call (since 5/28/2022)     Date and time call was made  6/22/2022  1:14 PM    Hospital care reviewed  Records reviewed    Patient was hospitialized at  [facility]    Date of Admission  06/20/22    Date of discharge  06/20/22    Diagnosis  insertion of Loop recorder    Disposition  Home    Were the patients medications reviewed and updated  No    Current Symptoms  None      TCM Call (since 5/28/2022)     Post hospital issues  None    Should patient be enrolled in anticoag monitoring? Yes    Scheduled for follow up?   Yes    Patients specialists  Cardiologist    Cardiologist name  Kristi Fermin    Cardiologist contact #  [phone]    Did you obtain your prescribed medications  Yes    Do you need help managing your prescriptions or medications  No    Is transportation to your appointment needed  No    I have advised the patient to call PCP with any new or worsening symptoms  Janette Becerra, DO

## 2022-06-29 LAB
ALBUMIN SERPL-MCNC: 3.7 G/DL (ref 3.6–5.1)
ALBUMIN/GLOB SERPL: 1.3 (CALC) (ref 1–2.5)
ALP SERPL-CCNC: 50 U/L (ref 35–144)
ALT SERPL-CCNC: 31 U/L (ref 9–46)
AST SERPL-CCNC: 24 U/L (ref 10–35)
BILIRUB SERPL-MCNC: 0.7 MG/DL (ref 0.2–1.2)
BUN SERPL-MCNC: 19 MG/DL (ref 7–25)
BUN/CREAT SERPL: NORMAL (CALC) (ref 6–22)
CALCIUM SERPL-MCNC: 8.7 MG/DL (ref 8.6–10.3)
CHLORIDE SERPL-SCNC: 107 MMOL/L (ref 98–110)
CHOLEST SERPL-MCNC: 97 MG/DL
CHOLEST/HDLC SERPL: 1.9 (CALC)
CO2 SERPL-SCNC: 28 MMOL/L (ref 20–32)
CREAT SERPL-MCNC: 1.22 MG/DL (ref 0.7–1.25)
GLOBULIN SER CALC-MCNC: 2.8 G/DL (CALC) (ref 1.9–3.7)
GLUCOSE SERPL-MCNC: 94 MG/DL (ref 65–99)
HDLC SERPL-MCNC: 50 MG/DL
LDLC SERPL CALC-MCNC: 34 MG/DL (CALC)
NONHDLC SERPL-MCNC: 47 MG/DL (CALC)
POTASSIUM SERPL-SCNC: 4.6 MMOL/L (ref 3.5–5.3)
PROT SERPL-MCNC: 6.5 G/DL (ref 6.1–8.1)
PSA SERPL-MCNC: 3.81 NG/ML
SL AMB EGFR AFRICAN AMERICAN: 71 ML/MIN/1.73M2
SL AMB EGFR NON AFRICAN AMERICAN: 61 ML/MIN/1.73M2
SODIUM SERPL-SCNC: 140 MMOL/L (ref 135–146)
TRIGL SERPL-MCNC: 46 MG/DL

## 2022-07-01 DIAGNOSIS — N13.8 BPH WITH URINARY OBSTRUCTION: ICD-10-CM

## 2022-07-01 DIAGNOSIS — N40.1 BPH WITH URINARY OBSTRUCTION: ICD-10-CM

## 2022-07-01 RX ORDER — TAMSULOSIN HYDROCHLORIDE 0.4 MG/1
0.4 CAPSULE ORAL
Qty: 90 CAPSULE | Refills: 1 | Status: SHIPPED | OUTPATIENT
Start: 2022-07-01

## 2022-07-15 DIAGNOSIS — G45.9 TIA (TRANSIENT ISCHEMIC ATTACK): ICD-10-CM

## 2022-08-23 ENCOUNTER — OFFICE VISIT (OUTPATIENT)
Dept: FAMILY MEDICINE CLINIC | Facility: CLINIC | Age: 67
End: 2022-08-23
Payer: MEDICARE

## 2022-08-23 VITALS
BODY MASS INDEX: 28.28 KG/M2 | TEMPERATURE: 97.9 F | SYSTOLIC BLOOD PRESSURE: 118 MMHG | DIASTOLIC BLOOD PRESSURE: 70 MMHG | WEIGHT: 180.2 LBS | HEIGHT: 67 IN

## 2022-08-23 DIAGNOSIS — I73.9 CLAUDICATION OF CALF MUSCLES (HCC): ICD-10-CM

## 2022-08-23 DIAGNOSIS — M79.662 PAIN OF LEFT CALF: Primary | ICD-10-CM

## 2022-08-23 PROCEDURE — 99213 OFFICE O/P EST LOW 20 MIN: CPT | Performed by: FAMILY MEDICINE

## 2022-08-23 NOTE — PROGRESS NOTES
Assessment/Plan:    Pain of left calf  Suspect possible tear of calf muscle will ask sports medicine for an opinion I do not feel this is due to back issues as there is no back pain and range of motion is normal    Claudication of calf muscles (HCC)  Possibly this could be claudication as it worens with walking and running I will go ahead and check arterial doppler       Diagnoses and all orders for this visit:    Pain of left calf  -     VAS lower limb arterial duplex, limited/unilateral; Future  -     Ambulatory Referral to Sports Medicine; Future    Claudication of calf muscles (HCC)  -     VAS lower limb arterial duplex, limited/unilateral; Future          Subjective:   Chief Complaint   Patient presents with    calf pain     Left  x 10 months           Patient ID: Primitivo Swenson is a 78 y o  male  Patient is here for pain in the left calf for last 10 months Patient notes the pain is mild most times but is always present He notes that if he walks more than one mile he needs to stop due to pain He also notes when he is running playing soccer after 30 minutes he must stop The pain is the calf muscle laterally Patient notes rest decreases the pain as does tylenol Patient pain level is 5 out of 10 at most He also tried the antiinflammatory cream with no help Patient has no back pain His left knee and ankle are normal Patient can do his ADL's with no issue However any extra walking or running results in pain  Leg Pain   Incident onset: 10 months ago  The incident occurred at home  There was no injury mechanism  The pain is present in the left leg  The quality of the pain is described as aching  The pain is at a severity of 5/10  The pain is moderate  The pain has been worsening since onset  Pertinent negatives include no inability to bear weight, loss of motion, loss of sensation, muscle weakness, numbness or tingling  He reports no foreign bodies present  Exacerbated by: walking or running   He has tried elevation, rest and acetaminophen for the symptoms  The treatment provided mild relief  The following portions of the patient's history were reviewed and updated as appropriate: allergies, current medications, past family history, past medical history, past social history, past surgical history and problem list     Review of Systems   Neurological: Negative for tingling and numbness  Objective:      /70   Temp 97 9 °F (36 6 °C)   Ht 5' 7" (1 702 m)   Wt 81 7 kg (180 lb 3 2 oz)   BMI 28 22 kg/m²          Physical Exam  Vitals and nursing note reviewed  Constitutional:       Appearance: Normal appearance  Cardiovascular:      Rate and Rhythm: Normal rate and regular rhythm  Pulmonary:      Effort: Pulmonary effort is normal       Breath sounds: Normal breath sounds  Musculoskeletal:      Comments: Left calf negative farhad's sign Full ROM of left knee and left ankle Patient is able to toe and heel walk Back has normal ROM Patient pulses in feet and popliteal are normal No varicose veins palpated   Skin:     Findings: No rash  Neurological:      General: No focal deficit present  Mental Status: He is alert and oriented to person, place, and time     Psychiatric:         Mood and Affect: Mood normal          Behavior: Behavior normal

## 2022-08-23 NOTE — ASSESSMENT & PLAN NOTE
Suspect possible tear of calf muscle will ask sports medicine for an opinion I do not feel this is due to back issues as there is no back pain and range of motion is normal

## 2022-08-23 NOTE — ASSESSMENT & PLAN NOTE
Possibly this could be claudication as it worens with walking and running I will go ahead and check arterial doppler

## 2022-08-25 DIAGNOSIS — I10 ESSENTIAL HYPERTENSION: ICD-10-CM

## 2022-08-25 RX ORDER — LISINOPRIL 5 MG/1
5 TABLET ORAL DAILY
Qty: 90 TABLET | Refills: 1 | Status: SHIPPED | OUTPATIENT
Start: 2022-08-25

## 2022-09-22 ENCOUNTER — HOSPITAL ENCOUNTER (OUTPATIENT)
Dept: NON INVASIVE DIAGNOSTICS | Facility: CLINIC | Age: 67
Discharge: HOME | End: 2022-09-22
Payer: MEDICARE

## 2022-09-22 DIAGNOSIS — M79.662 PAIN OF LEFT CALF: ICD-10-CM

## 2022-09-22 DIAGNOSIS — I73.9 CLAUDICATION OF CALF MUSCLES (HCC): ICD-10-CM

## 2022-09-22 PROCEDURE — 93926 LOWER EXTREMITY STUDY: CPT

## 2022-09-23 ENCOUNTER — TELEPHONE (OUTPATIENT)
Dept: FAMILY MEDICINE CLINIC | Facility: CLINIC | Age: 67
End: 2022-09-23

## 2022-09-23 DIAGNOSIS — E78.2 MIXED HYPERLIPIDEMIA: ICD-10-CM

## 2022-09-23 DIAGNOSIS — I10 ESSENTIAL HYPERTENSION: Primary | ICD-10-CM

## 2022-09-23 DIAGNOSIS — G45.9 TIA (TRANSIENT ISCHEMIC ATTACK): ICD-10-CM

## 2022-09-23 PROCEDURE — 93922 UPR/L XTREMITY ART 2 LEVELS: CPT | Performed by: SURGERY

## 2022-09-23 PROCEDURE — 93926 LOWER EXTREMITY STUDY: CPT | Performed by: SURGERY

## 2022-09-23 NOTE — TELEPHONE ENCOUNTER
Patient is aware, he will get it done the morning of his appointment  Could you place orders and I will mail to patient

## 2022-09-28 ENCOUNTER — OFFICE VISIT (OUTPATIENT)
Dept: OBGYN CLINIC | Facility: MEDICAL CENTER | Age: 67
End: 2022-09-28
Payer: MEDICARE

## 2022-09-28 ENCOUNTER — APPOINTMENT (OUTPATIENT)
Dept: RADIOLOGY | Facility: MEDICAL CENTER | Age: 67
End: 2022-09-28
Payer: MEDICARE

## 2022-09-28 VITALS
WEIGHT: 182.6 LBS | BODY MASS INDEX: 28.66 KG/M2 | DIASTOLIC BLOOD PRESSURE: 70 MMHG | HEART RATE: 57 BPM | HEIGHT: 67 IN | SYSTOLIC BLOOD PRESSURE: 124 MMHG

## 2022-09-28 DIAGNOSIS — M79.662 PAIN OF LEFT CALF: Primary | ICD-10-CM

## 2022-09-28 DIAGNOSIS — M43.16 SPONDYLOLISTHESIS OF LUMBAR REGION: ICD-10-CM

## 2022-09-28 DIAGNOSIS — M79.662 PAIN OF LEFT CALF: ICD-10-CM

## 2022-09-28 PROCEDURE — 73590 X-RAY EXAM OF LOWER LEG: CPT

## 2022-09-28 PROCEDURE — 72100 X-RAY EXAM L-S SPINE 2/3 VWS: CPT

## 2022-09-28 PROCEDURE — 99204 OFFICE O/P NEW MOD 45 MIN: CPT | Performed by: EMERGENCY MEDICINE

## 2022-09-28 RX ORDER — METHYLPREDNISOLONE 4 MG/1
TABLET ORAL
Qty: 1 EACH | Refills: 0 | Status: SHIPPED | OUTPATIENT
Start: 2022-09-28

## 2022-09-28 NOTE — PROGRESS NOTES
Assessment/Plan:    Diagnoses and all orders for this visit:    Pain of left calf  -     Ambulatory Referral to Sports Medicine  -     XR spine lumbar 2 or 3 views injury; Future  -     XR tibia fibula 2 vw left; Future  -     Ambulatory Referral to Physical Therapy; Future  -     methylPREDNISolone 4 MG tablet therapy pack; Use as directed on package  -     MRI lumbar spine wo contrast; Future  -     MRI tibia fibula left wo contrast; Future  -     VAS lower limb venous duplex study, unilateral/limited; Future    Spondylolisthesis of lumbar region  -     Ambulatory Referral to Physical Therapy; Future  -     methylPREDNISolone 4 MG tablet therapy pack; Use as directed on package  -     MRI lumbar spine wo contrast; Future  -     MRI tibia fibula left wo contrast; Future  -     VAS lower limb venous duplex study, unilateral/limited; Future    DDx:  Claudication secondary to L spine stenosis vs Chronic gastroc strain  Also t/c Exercise Arterial Doppler, as resting arterial doppler is wnl  MRI Lumbar spine evaluate for left nerve impingement / canal stenosis  MRI Left tib/fib eval for chronic gastroc strain  Reviewed Xrays L spine and Xrays left Tib/Fib    Recommend PT  Will try Medrol dose pack, as patient is unable to take NSAIDs     Return for Follow Up After Imaging Study  Chief Complaint:     Chief Complaint   Patient presents with    Left Lower Leg - Pain       Subjective:   Patient ID: Cinthia Chavez is a 78 y o  male  NP PMHx TIA with loop recorder on Eliquis presents referred by PCP DR Gavin Delgado for chronic worsening Left posterior lateral calf pain which radiates up to posterior  knee, and this occurs with walking usually a mile or more  Denies pain getting out of bed or walking around the house  Denies N/T or back pain  Notes BENEFIT from compression stockings with less pain and can walk further  He has undergone resting Arterial Doppler which was wnl      Patient is very active plays soccer and walks  Review of Systems    The following portions of the patient's chart were reviewed and updated as appropriate: Allergy:  No Known Allergies      Past Medical History:   Diagnosis Date    Hyperglycemia     Resolved 3/31/2016     Hypertension        Past Surgical History:   Procedure Laterality Date    CARDIAC LOOP RECORDER      CARDIAC SURGERY      COLONOSCOPY      Proctitis, otherwise nml  Dr Juaquin Goldmann   Resolved 12/3/2008        Social History     Socioeconomic History    Marital status: /Civil Union     Spouse name: Not on file    Number of children: Not on file    Years of education: Not on file    Highest education level: Not on file   Occupational History    Not on file   Tobacco Use    Smoking status: Never Smoker    Smokeless tobacco: Never Used   Vaping Use    Vaping Use: Never used   Substance and Sexual Activity    Alcohol use: Yes     Comment: Social     Drug use: Never    Sexual activity: Yes     Partners: Female     Birth control/protection: Post-menopausal     Comment: 2 kids 35 and 32   Other Topics Concern    Not on file   Social History Narrative    Not on file     Social Determinants of Health     Financial Resource Strain: Not on file   Food Insecurity: Not on file   Transportation Needs: Not on file   Physical Activity: Not on file   Stress: Not on file   Social Connections: Not on file   Intimate Partner Violence: Not on file   Housing Stability: Not on file       Family History   Problem Relation Age of Onset    Dementia Mother     Stroke Father     Hypertension Father     No Known Problems Sister     No Known Problems Daughter     No Known Problems Daughter     No Known Problems Sister     No Known Problems Sister     No Known Problems Sister        Medications:    Current Outpatient Medications:     lisinopril (ZESTRIL) 5 mg tablet, Take 1 tablet (5 mg total) by mouth daily, Disp: 90 tablet, Rfl: 1    methylPREDNISolone 4 MG tablet therapy pack, Use as directed on package, Disp: 1 each, Rfl: 0    apixaban (Eliquis) 5 mg, Take 1 tablet (5 mg total) by mouth 2 (two) times a day, Disp: 180 tablet, Rfl: 1    aspirin 81 mg chewable tablet, Chew 81 mg daily, Disp: , Rfl:     atorvastatin (LIPITOR) 40 mg tablet, Take 1 tablet (40 mg total) by mouth daily, Disp: 90 tablet, Rfl: 1    multivitamin (THERAGRAN) TABS, Take 1 tablet by mouth daily, Disp: , Rfl:     tamsulosin (FLOMAX) 0 4 mg, Take 1 capsule (0 4 mg total) by mouth daily with dinner, Disp: 90 capsule, Rfl: 1    Patient Active Problem List   Diagnosis    Essential hypertension    Nocturia    Screening for prostate cancer    Overweight    Annual physical exam    Obstructive sleep apnea syndrome    BPH with urinary obstruction    TIA (transient ischemic attack)    Mixed hyperlipidemia    Medicare annual wellness visit, initial    Skin tags, multiple acquired    Patent foramen ovale with right to left shunt    Memory loss    Cognitive and behavioral changes    Pain of left calf    Claudication of calf muscles (HCC)       Objective:  /70   Pulse 57   Ht 5' 7" (1 702 m)   Wt 82 8 kg (182 lb 9 6 oz)   BMI 28 60 kg/m²     Left Knee Exam     Muscle Strength   The patient has normal left knee strength  Range of Motion   The patient has normal left knee ROM      Other   Erythema: absent    Comments:  Tenderness of the posterior mid gastrocnemius  Compartments are soft  Neg SLR  No pain with passive dorsiflexion of the ankle  Mild discomfort with resisted PF      Back Exam     Muscle Strength   Right Quadriceps:  5/5   Left Quadriceps:  5/5     Tests   Straight leg raise right: negative  Straight leg raise left: negative    Reflexes   Patellar: normal  Achilles: normal    Comments:  Neg Clonus left ankle            Physical Exam  Musculoskeletal:      Lumbar back: Negative right straight leg raise test and negative left straight leg raise test            Neurologic Exam Motor Exam     Strength   Right quadriceps: 5/5  Left quadriceps: 5/5      Procedures    I have personally reviewed the written report of the pertinent studies  Arterial Duplex LE    LEFT LOWER LIMB:  This resting evaluation shows no evidence of significant lower extremity  arterial occlusive disease  Ankle/Brachial index: 1 26, normal range  Metatarsal pressure of 122 mm Hg  Great toe pressure of 119 mm Hg, within healing range    PVR/ PPG tracings are normal        Xrays Left Tib/fib:  Xrays Lumbar spine:

## 2022-09-29 ENCOUNTER — HOSPITAL ENCOUNTER (OUTPATIENT)
Dept: NON INVASIVE DIAGNOSTICS | Facility: HOSPITAL | Age: 67
Discharge: HOME | End: 2022-09-29
Attending: EMERGENCY MEDICINE
Payer: MEDICARE

## 2022-09-29 DIAGNOSIS — M79.662 PAIN OF LEFT CALF: ICD-10-CM

## 2022-09-29 DIAGNOSIS — M43.16 SPONDYLOLISTHESIS OF LUMBAR REGION: ICD-10-CM

## 2022-09-29 PROCEDURE — 93971 EXTREMITY STUDY: CPT

## 2022-09-29 PROCEDURE — 93971 EXTREMITY STUDY: CPT | Performed by: SURGERY

## 2022-10-11 PROBLEM — Z00.00 MEDICARE ANNUAL WELLNESS VISIT, INITIAL: Status: RESOLVED | Noted: 2022-03-28 | Resolved: 2022-10-11

## 2022-10-14 ENCOUNTER — HOSPITAL ENCOUNTER (OUTPATIENT)
Dept: MRI IMAGING | Facility: HOSPITAL | Age: 67
Discharge: HOME | End: 2022-10-14
Attending: EMERGENCY MEDICINE
Payer: MEDICARE

## 2022-10-14 ENCOUNTER — HOSPITAL ENCOUNTER (OUTPATIENT)
Dept: RADIOLOGY | Facility: HOSPITAL | Age: 67
Discharge: HOME | End: 2022-10-14
Payer: MEDICARE

## 2022-10-14 DIAGNOSIS — M43.16 SPONDYLOLISTHESIS OF LUMBAR REGION: ICD-10-CM

## 2022-10-14 DIAGNOSIS — Z96.9 PRESENCE OF FUNCTIONAL IMPLANT: ICD-10-CM

## 2022-10-14 DIAGNOSIS — M79.662 PAIN OF LEFT CALF: ICD-10-CM

## 2022-10-14 PROCEDURE — G1004 CDSM NDSC: HCPCS

## 2022-10-14 PROCEDURE — 73718 MRI LOWER EXTREMITY W/O DYE: CPT

## 2022-10-14 PROCEDURE — 72148 MRI LUMBAR SPINE W/O DYE: CPT

## 2022-10-20 ENCOUNTER — OFFICE VISIT (OUTPATIENT)
Dept: OBGYN CLINIC | Facility: MEDICAL CENTER | Age: 67
End: 2022-10-20
Payer: MEDICARE

## 2022-10-20 VITALS
BODY MASS INDEX: 28.56 KG/M2 | HEART RATE: 60 BPM | HEIGHT: 67 IN | DIASTOLIC BLOOD PRESSURE: 54 MMHG | WEIGHT: 182 LBS | SYSTOLIC BLOOD PRESSURE: 99 MMHG

## 2022-10-20 DIAGNOSIS — M51.36 LUMBAR DEGENERATIVE DISC DISEASE: ICD-10-CM

## 2022-10-20 DIAGNOSIS — M48.061 LUMBAR FORAMINAL STENOSIS: ICD-10-CM

## 2022-10-20 DIAGNOSIS — M79.662 PAIN OF LEFT CALF: Primary | ICD-10-CM

## 2022-10-20 DIAGNOSIS — M43.16 SPONDYLOLISTHESIS OF LUMBAR REGION: ICD-10-CM

## 2022-10-20 DIAGNOSIS — M48.061 SPINAL STENOSIS OF LUMBAR REGION, UNSPECIFIED WHETHER NEUROGENIC CLAUDICATION PRESENT: ICD-10-CM

## 2022-10-20 PROCEDURE — 99214 OFFICE O/P EST MOD 30 MIN: CPT | Performed by: EMERGENCY MEDICINE

## 2022-10-20 NOTE — PROGRESS NOTES
Assessment/Plan:    Diagnoses and all orders for this visit:    Pain of left calf  -     VAS lower limb arterial duplex, limited/unilateral; Future    Spondylolisthesis of lumbar region    Spinal stenosis of lumbar region, unspecified whether neurogenic claudication present    Lumbar foraminal stenosis    Lumbar degenerative disc disease    No explaination of pain symptoms on MRI L spine and Left Tib/Fib  Patient has undergone previous arterial duplex however will order duplex with dynamic and stress maneuvers to evaluate for popliteal artery entrapment, which after talking to the vascular tech I was informed that these were not performed previously  Claudication may occur paradoxically with walking rather than running due to prolonged gastrocnemius contraction  Also t/c Referral to Pain Management for second opinion regarding mild to moderate L spine stenosis  However patient declines at this time  Also t/c chronic compartment syndrome however no symptoms with soccer     Return for Follow Up After Imaging Study  Chief Complaint:     Chief Complaint   Patient presents with   • Left Lower Leg - Follow-up       Subjective:   Patient ID: Latisha Aguilar is a 78 y o  male  Patient returns to review MRI L spine and Left Tib/Fib  He notes he is able to continue to play soccer and denies any significant symptoms during the 1st 1-2 hours  His symptoms only seem to be present with prolonged walking more than a mile    Initial note:  NP PMHx TIA with loop recorder on Eliquis presents referred by PCP DR Luz Teixeira for chronic worsening Left posterior lateral calf pain which radiates up to posterior  knee, and this occurs with walking usually a mile or more  Denies pain getting out of bed or walking around the house  Denies N/T or back pain  Notes BENEFIT from compression stockings with less pain and can walk further  He has undergone resting Arterial Doppler which was wnl      Patient is very active plays soccer and walks  Review of Systems    The following portions of the patient's chart were reviewed and updated as appropriate: Allergy:  No Known Allergies      Past Medical History:   Diagnosis Date   • Hyperglycemia     Resolved 3/31/2016    • Hypertension        Past Surgical History:   Procedure Laterality Date   • CARDIAC LOOP RECORDER     • CARDIAC SURGERY     • COLONOSCOPY      Proctitis, otherwise nml  Dr Lakshmi Iraheta   Resolved 12/3/2008        Social History     Socioeconomic History   • Marital status: /Civil Union     Spouse name: Not on file   • Number of children: Not on file   • Years of education: Not on file   • Highest education level: Not on file   Occupational History   • Not on file   Tobacco Use   • Smoking status: Never Smoker   • Smokeless tobacco: Never Used   Vaping Use   • Vaping Use: Never used   Substance and Sexual Activity   • Alcohol use: Yes     Comment: Social    • Drug use: Never   • Sexual activity: Yes     Partners: Female     Birth control/protection: Post-menopausal     Comment: 2 kids 35 and 32   Other Topics Concern   • Not on file   Social History Narrative   • Not on file     Social Determinants of Health     Financial Resource Strain: Not on file   Food Insecurity: Not on file   Transportation Needs: Not on file   Physical Activity: Not on file   Stress: Not on file   Social Connections: Not on file   Intimate Partner Violence: Not on file   Housing Stability: Not on file       Family History   Problem Relation Age of Onset   • Dementia Mother    • Stroke Father    • Hypertension Father    • No Known Problems Sister    • No Known Problems Daughter    • No Known Problems Daughter    • No Known Problems Sister    • No Known Problems Sister    • No Known Problems Sister        Medications:    Current Outpatient Medications:   •  apixaban (Eliquis) 5 mg, Take 1 tablet (5 mg total) by mouth 2 (two) times a day, Disp: 180 tablet, Rfl: 1  •  aspirin 81 mg chewable tablet, Chew 81 mg daily, Disp: , Rfl:   •  atorvastatin (LIPITOR) 40 mg tablet, Take 1 tablet (40 mg total) by mouth daily, Disp: 90 tablet, Rfl: 1  •  lisinopril (ZESTRIL) 5 mg tablet, Take 1 tablet (5 mg total) by mouth daily, Disp: 90 tablet, Rfl: 1  •  methylPREDNISolone 4 MG tablet therapy pack, Use as directed on package, Disp: 1 each, Rfl: 0  •  multivitamin (THERAGRAN) TABS, Take 1 tablet by mouth daily, Disp: , Rfl:   •  tamsulosin (FLOMAX) 0 4 mg, Take 1 capsule (0 4 mg total) by mouth daily with dinner, Disp: 90 capsule, Rfl: 1    Patient Active Problem List   Diagnosis   • Essential hypertension   • Nocturia   • Screening for prostate cancer   • Overweight   • Annual physical exam   • Obstructive sleep apnea syndrome   • BPH with urinary obstruction   • TIA (transient ischemic attack)   • Mixed hyperlipidemia   • Skin tags, multiple acquired   • Patent foramen ovale with right to left shunt   • Memory loss   • Cognitive and behavioral changes   • Pain of left calf   • Claudication of calf muscles (HCC)       Objective:  BP 99/54   Pulse 60   Ht 5' 7" (1 702 m)   Wt 82 6 kg (182 lb)   BMI 28 51 kg/m²     Ortho Exam    Physical Exam      Neurologic Exam    Procedures    I have personally reviewed the written report of the pertinent studies  LUMBAR DISC SPACES:     L1-L2:  Normal      L2-L3:  Normal      L3-L4:  Disc desiccation and slight loss of disc height  Mild diffuse annular bulging and facet arthropathy  There is mild central canal stenosis  No foraminal nerve impingement      L4-L5:  Mild annular bulging and moderate facet degenerative change  Mild to moderate canal stenosis with slight distortion of the thecal sac  Mild left and moderate right foraminal narrowing      L5-S1:  Normal      IMPRESSION:  Lumbar degenerative change at the L3-4 and L4-5 levels with annular bulging and posterior element hypertrophic changes    These changes are more pronounced at the L4-5 level            MRI Tib/Fib  IMPRESSION:  Normal MRI of the left tibia fibula  No source of posterior calf pain noted  Arterial Duplex LE     LEFT LOWER LIMB:  This resting evaluation shows no evidence of significant lower extremity  arterial occlusive disease  Ankle/Brachial index: 1 26, normal range  Metatarsal pressure of 122 mm Hg  Great toe pressure of 119 mm Hg, within healing range    PVR/ PPG tracings are normal         Xrays Left Tib/fib:  Xrays Lumbar spine:

## 2022-10-24 ENCOUNTER — EVALUATION (OUTPATIENT)
Dept: PHYSICAL THERAPY | Facility: MEDICAL CENTER | Age: 67
End: 2022-10-24
Payer: MEDICARE

## 2022-10-24 DIAGNOSIS — M43.16 SPONDYLOLISTHESIS OF LUMBAR REGION: ICD-10-CM

## 2022-10-24 DIAGNOSIS — M79.662 PAIN OF LEFT CALF: Primary | ICD-10-CM

## 2022-10-24 PROCEDURE — 97110 THERAPEUTIC EXERCISES: CPT | Performed by: PHYSICAL THERAPIST

## 2022-10-24 PROCEDURE — 97161 PT EVAL LOW COMPLEX 20 MIN: CPT | Performed by: PHYSICAL THERAPIST

## 2022-10-27 ENCOUNTER — OFFICE VISIT (OUTPATIENT)
Dept: PHYSICAL THERAPY | Facility: MEDICAL CENTER | Age: 67
End: 2022-10-27
Payer: MEDICARE

## 2022-10-27 DIAGNOSIS — M79.662 PAIN OF LEFT CALF: Primary | ICD-10-CM

## 2022-10-27 DIAGNOSIS — M43.16 SPONDYLOLISTHESIS OF LUMBAR REGION: ICD-10-CM

## 2022-10-27 PROCEDURE — 97140 MANUAL THERAPY 1/> REGIONS: CPT | Performed by: PHYSICAL THERAPIST

## 2022-10-27 PROCEDURE — 97110 THERAPEUTIC EXERCISES: CPT | Performed by: PHYSICAL THERAPIST

## 2022-10-27 NOTE — PROGRESS NOTES
Daily Note     Today's date: 10/27/2022  Patient name: José Snow  : 1955  MRN: [id number]  Referring provider: Aury Ventura MD  Dx:   Encounter Diagnosis     ICD-10-CM    1  Pain of left calf  M79 662    2  Spondylolisthesis of lumbar region  M43 16                   Subjective: Patient reports that he has been feeling a little better with slightly less pain in his calf when walking after doing his exercises at home  Objective: See treatment diary below    BP: 125/70  HR: 53 bpm    Assessment: Patient presented with L tibial rotational hypomobility along with L proximal fibular hypomobility  Addressed tibiofibular hypomobility with manual therapy  Patient reported decreased L gastroc discomfort when ambulating after manuals  Continued with HS/gastroc flexibility program and initiated gentle ankle strengthening within a pain-free range  He reported 1 mild episode of lightheadedness when ambulating after period of prolonged supine lying  Symptoms resolved completely after approx 3 seconds, and vitals were [facility] upon assessment  No lightheadedness or dizziness present throughout the remainder of session  Will continue to monitor  Patient tolerated treatment well and was able to complete program without pain  Patient would benefit from continued PT to address impairments to maximize function  Plan: Continue per plan of care  Precautions: hx of TIA, LOOP RECORDER, patent foramen ovale, on Eliquis/Aspirin, HTN, grade I anterolisthesis L4-L5    *NO IASTM or ESTIM      Manuals 10/24 10/27           HS stretching NV            L fibular head mobs NV KP Gr  III (posterior)           L tibial rotational mobs NV KP seated Gr  III                         Neuro Re-Ed             Supine sciatic nerve glides?                                                                                            Ther Ex             Rec bike NV 5'           Strap gastroc stretch 30"x4 HEP 30"x4           Long sit HS stretch 30"x4 HEP 30"x4           4-way ankle tband NV 3"x20 GTB           Heel raises NV 5"x30 seated           Wall gastroc stretch  30"x3           SKTC?              HEP education and instruction/activity modification instruction x8'                         Ther Activity                                       Gait Training                                       Modalities

## 2022-10-31 ENCOUNTER — OFFICE VISIT (OUTPATIENT)
Dept: PHYSICAL THERAPY | Facility: MEDICAL CENTER | Age: 67
End: 2022-10-31

## 2022-10-31 DIAGNOSIS — M43.16 SPONDYLOLISTHESIS OF LUMBAR REGION: ICD-10-CM

## 2022-10-31 DIAGNOSIS — M79.662 PAIN OF LEFT CALF: Primary | ICD-10-CM

## 2022-10-31 NOTE — PROGRESS NOTES
Daily Note     Today's date: 10/31/2022  Patient name: Gómez Faria  : 1955  MRN: [id number]  Referring provider: Triston Smith MD  Dx:   Encounter Diagnosis     ICD-10-CM    1  Pain of left calf  M79 662    2  Spondylolisthesis of lumbar region  M43 16                   Subjective: Patient reports that he felt pretty good after his last PT session with no pain  He reports that he had pain in his calf over the past weekend after dancing for a long period of time  He also reports that he has some discomfort today due to being on his feet for 2 hours this morning  Objective: See treatment diary below      Assessment: Patient continues to ambulate with decreased L tibial IR during stance phase of gait cycle due to L tibial rotational hypomobility  Continued with manual interventions to improve tibial IR mobility and posterior fibular mobility  Patient reported no calf pain when ambulating after manuals  Patient initially reported discomfort in gastroc when performing standing heel raises that was reduced after biasing posterior tib tendon with addition of INV isometrics to heel raises  Added wall soleus stretch today with cueing provided to prevent excessive tibial ER  Patient was educated to slow gait speed when ambulating at home and was encouraged to prevent toe out on L LE  All interventions were performed in a pain-free range, and patient reported no pain post-tx  Patient would benefit from continued PT to improve L tibiofibular mobility, soft tissue flexibility, and strength to return to active lifestyle  Plan: Continue per plan of care  Precautions: hx of TIA, LOOP RECORDER, patent foramen ovale, on Eliquis/Aspirin, HTN, grade I anterolisthesis L4-L5    *NO IASTM or ESTIM      Manuals 10/24 10/27 10/31          HS stretching NV            L fibular head mobs NV KP Gr  III (posterior) KP Gr  III (posterior)          L tibial rotational mobs NV KP seated Gr   III  KP seated Gr  III (IR focus) Neuro Re-Ed             Supine sciatic nerve glides? Ther Ex             Rec bike NV 5' 5'          Strap gastroc stretch 30"x4 HEP 30"x4 30"x4          Long sit HS stretch 30"x4 HEP 30"x4 30"x4          4-way ankle tband NV 3"x20 GTB 3"x20 GTB          Heel raises NV 5"x30 seated 5"x20 stand + INV with ball          Wall gastroc stretch  30"x3 30"x4          Wall soleus stretch   20"x3          SKTC?              HEP education and instruction/activity modification instruction x8'                         Ther Activity                                       Gait Training                                       Modalities

## 2022-11-03 ENCOUNTER — APPOINTMENT (OUTPATIENT)
Dept: PHYSICAL THERAPY | Facility: MEDICAL CENTER | Age: 67
End: 2022-11-03

## 2022-11-07 ENCOUNTER — APPOINTMENT (OUTPATIENT)
Dept: PHYSICAL THERAPY | Facility: MEDICAL CENTER | Age: 67
End: 2022-11-07

## 2022-11-10 ENCOUNTER — OFFICE VISIT (OUTPATIENT)
Dept: PHYSICAL THERAPY | Facility: MEDICAL CENTER | Age: 67
End: 2022-11-10

## 2022-11-10 DIAGNOSIS — M43.16 SPONDYLOLISTHESIS OF LUMBAR REGION: ICD-10-CM

## 2022-11-10 DIAGNOSIS — M79.662 PAIN OF LEFT CALF: Primary | ICD-10-CM

## 2022-11-10 NOTE — PROGRESS NOTES
Daily Note     Today's date: 11/10/2022  Patient name: Mirian Cerrato  : 1955  MRN: [id number]  Referring provider: Matt Vizcarra MD  Dx:   Encounter Diagnosis     ICD-10-CM    1  Pain of left calf  M79 662    2  Spondylolisthesis of lumbar region  M43 16                   Subjective: Patient reports that he is continuing to have some discomfort in his calf, but he felt pretty good after his last PT session  He desires to return to disc golf and tennis  This will be his last visit until 22 due to traveling  Objective: See treatment diary below      Assessment: Continued with manual interventions to address proximal tibial IR/posterior hypomobility  Patient reported decreased discomfort in L calf when ambulating after manual interventions  Cueing provided throughout session to prevent L tibial ER when ambulating and when performing stretching/strengthening interventions  Patient was educated in illustrated HEP addition of 4-way ankle strengthening with tband to improve stability for disc golf and tennis  Patient reported no L calf pain during heel raises today, which demonstrates good progress toward goals  All exercises were performed in a pain-free range  Will re-evaluate on 22 and progress treatment as appropriate  Plan: Re-evaluate until 22 upon return from travel  Precautions: hx of TIA, LOOP RECORDER, patent foramen ovale, on Eliquis/Aspirin, HTN, grade I anterolisthesis L4-L5    *NO IASTM or ESTIM      Manuals 10/24 10/27 10/31 11/10         HS stretching NV            L fibular head mobs NV KP Gr  III (posterior) KP Gr  III (posterior) KP Gr  III-IV (posterior)         L tibial rotational mobs NV KP seated Gr  III  KP seated Gr  III (IR focus) KP seated Gr  III-IV (IR focus)                      Neuro Re-Ed             Supine sciatic nerve glides?                                                                                            Ther Ex             Rec bike NV 5' 5' 5'         Strap gastroc stretch 30"x4 HEP 30"x4 30"x4 30"x4         Long sit HS stretch 30"x4 HEP 30"x4 30"x4 30"x4         4-way ankle tband NV 3"x20 GTB 3"x20 GTB 3"x20 blue         Heel raises NV 5"x30 seated 5"x20 stand + INV with ball 5"x20 stand (no INV)         Wall gastroc stretch  30"x3 30"x4 30"x4         Wall soleus stretch   20"x3 30"x4         SKTC?              HEP education and instruction/activity modification instruction x8'                         Ther Activity                                       Gait Training                                       Modalities

## 2022-11-29 ENCOUNTER — EVALUATION (OUTPATIENT)
Dept: PHYSICAL THERAPY | Facility: MEDICAL CENTER | Age: 67
End: 2022-11-29

## 2022-11-29 DIAGNOSIS — M43.16 SPONDYLOLISTHESIS OF LUMBAR REGION: ICD-10-CM

## 2022-11-29 DIAGNOSIS — M79.662 PAIN OF LEFT CALF: Primary | ICD-10-CM

## 2022-11-29 NOTE — PROGRESS NOTES
PT Re-Evaluation     Today's date: 2022  Patient name: Kirill Postal  : 1955  MRN: [id number]  Referring provider: Raj Dorantes MD  Dx:   Encounter Diagnosis     ICD-10-CM    1  Pain of left calf  M79 662       2  Spondylolisthesis of lumbar region  M43 16                      Subjective: Patient reports that he continues to have occasional calf discomfort after walking longer distances, but he notes that he was able to walk 4-5 miles per day when traveling  He also reports that he was able to play disc golf a few days ago with some soreness afterwards, but he reports that his calf pain was not as intense as when playing previously  He is pleased with his overall progress and would like to continue PT to improve his ability to play disc golf and walk longer distances  Objective: See treatment diary below    DL squat: Forward trunk lean    SLS: WFL bilaterally    DL heel raise assessment:  L: 20 reps (L calf pain)  R: 20 reps (pain-free)    SL heel raise assessment:  L: 10 reps (L calf pain)  R: 10 reps (pain-free)    Ankle AROM/PROM:  WFL and symmetrical bilaterally all planes    Talocrural joint mobility:  WFL bilaterally    Ankle strength:  DF: L: 4+/5, R: 5/5  PF: L: 4+/5, R: 5/5  INV: L: 4+/5, R: 5/5  EV: L: 4+/5, R: 5/5    Mild limitations in L HS/gastroc flexibility    Tibial rotational mobility:  ER: WFL bilaterally  IR: L: moderate limitations, R: WFL      Assessment: This is patient's first visit since 11/10/22 due to traveling  Since initial visit, patient has demonstrated steady progress with improving L ankle AROM and strength, which has improved his ability to ambulate longer distances with decreased intensity of L calf pain   Although improved, patient continues to demonstrate L tibial IR hypomobility and CKC strength deficits in his L ankle complex, which contributes to recurring L HS/gastroc/soleus tightness and soft tissue restriction after repetitive activities, such as disc golf  Patient is demonstrating good progress toward his goals  He responded well to manual interventions today with report of decreased L calf pain during DL heel raise assessment post-tx  Patient was given tband of increased resistance to further improve multiplanar L ankle strength during HEP performance  Patient tolerated treatment well and was able to complete session without pain  Patient would benefit from continued PT to further improve tibial mobility, soft tissue flexibility, and CKC L ankle strength to be able to ambulate long community distances and play disc golf to full prior capacity without recurrence of symptoms  Goals  STG:  Patient will be independent with home exercise program - met  Patient will increase flexibility of L HS to improve ambulation distance  - in progress (improved)  LTG:  Patient will increase strength of L ankle plantarflexors to 5/5 to be able to walk long community distances  - in progress (improved)  Patient will exceed MCID on FOTO to demonstrate improved function  - in progress  Patient will be able to walk greater than 1 mile  - met  Patient will be able to disc golf  - in progress (able to play; soreness after)  Patient will be able to manage symptoms independently  - in progress     Plan: 2x/week for 4 more weeks  Precautions: hx of TIA, LOOP RECORDER, patent foramen ovale, on Eliquis/Aspirin, HTN, grade I anterolisthesis L4-L5    *NO IASTM or ESTIM      Manuals 10/24 10/27 10/31 11/10 11/29        HS stretching NV            L fibular head mobs NV KP Gr  III (posterior) KP Gr  III (posterior) KP Gr  III-IV (posterior)         L tibial rotational mobs NV KP seated Gr  III  KP seated Gr  III (IR focus) KP seated Gr  III-IV (IR focus) KP prone Gr  III-IV (IR focus)        Re-evaluation             Neuro Re-Ed             Supine sciatic nerve glides?                                                                                            Ther Ex             Rec bike NV 5' 5' 5' 5'        Strap gastroc stretch 30"x4 HEP 30"x4 30"x4 30"x4 30"x4        Long sit HS stretch 30"x4 HEP 30"x4 30"x4 30"x4 30"x4        4-way ankle tband NV 3"x20 GTB 3"x20 GTB 3"x20 blue 3"x20 black        Heel raises NV 5"x30 seated 5"x20 stand + INV with ball 5"x20 stand (no INV) during assessment        Wall gastroc stretch  30"x3 30"x4 30"x4 30"x4        Wall soleus stretch   20"x3 30"x4 30"x4        SKTC?              HEP education and instruction/activity modification instruction x8'    x5'                     Ther Activity                                       Gait Training                                       Modalities

## 2022-12-01 ENCOUNTER — OFFICE VISIT (OUTPATIENT)
Dept: PHYSICAL THERAPY | Facility: MEDICAL CENTER | Age: 67
End: 2022-12-01

## 2022-12-01 DIAGNOSIS — M43.16 SPONDYLOLISTHESIS OF LUMBAR REGION: ICD-10-CM

## 2022-12-01 DIAGNOSIS — M79.662 PAIN OF LEFT CALF: Primary | ICD-10-CM

## 2022-12-01 NOTE — PROGRESS NOTES
Daily Note     Today's date: 2022  Patient name: Ciera Phillips  : 1955  MRN: [id number]  Referring provider: Von Haddad MD  Dx:   Encounter Diagnosis     ICD-10-CM    1  Pain of left calf  M79 662       2  Spondylolisthesis of lumbar region  M43 16                      Subjective: Patient reports that he is feeling pretty good  He reports that the was able to do a lot of yardwork this morning and spent a lot of time on his feet  He has some discomfort in his calf but is not in any pain  He is planning on returning to light soccer in a few days  Objective: See treatment diary below      Assessment: Continued with tibial rotational mobs/tibial distraction to address hypomobility and to decrease compensatory strain on L gastroc/soleus complex when ambulating and running  Patient responded well to manuals with report of decreased stiffness post-tx  Able to progress reps for 4-way ankle tband, which demonstrates an improvement in multiplanar ankle endurance  Also added SL heel raises to improve ankle strength  Cueing provided during SL heel raises to prevent forward weight shifting  Patient demonstrated appropriate fatigue post-tx and was able to complete program without pain  Patient was educated to gradually ease into playing soccer and to stop if he develops pain  He verbalized understanding  Patient would benefit from continued PT to improve tibial mobility, gastroc/soleus flexibility, and ankle strength to return to active lifestyle  Plan: Continue per plan of care  Precautions: hx of TIA, LOOP RECORDER, patent foramen ovale, on Eliquis/Aspirin, HTN, grade I anterolisthesis L4-L5    *NO IASTM or ESTIM      Manuals 10/24 10/27 10/31 11/10 11/29 12/1       HS stretching NV            L fibular head mobs NV KP Gr  III (posterior) KP Gr  III (posterior) KP Gr  III-IV (posterior)         L tibial rotational mobs NV KP seated Gr   III  KP seated Gr  III (IR focus) KP seated Gr  III-IV (IR focus) KP prone Gr  III-IV (IR focus) KP prone Gr  III-IV (IR focus)       Re-evaluation     KP        Neuro Re-Ed             Supine sciatic nerve glides? Ther Ex             Rec bike NV 5' 5' 5' 5' 10'       Strap gastroc stretch 30"x4 HEP 30"x4 30"x4 30"x4 30"x4 30"x4       Long sit HS stretch 30"x4 HEP 30"x4 30"x4 30"x4 30"x4 30"x4       4-way ankle tband NV 3"x20 GTB 3"x20 GTB 3"x20 blue 3"x20 black 5"x30 black       Heel raises NV 5"x30 seated 5"x20 stand + INV with ball 5"x20 stand (no INV) during assessment 5"x30 DL, 5"x10 SL       Wall gastroc stretch  30"x3 30"x4 30"x4 30"x4 30"x4       Wall soleus stretch   20"x3 30"x4 30"x4 30"x4       SKTC?              HEP education and instruction/activity modification instruction x8'    x5'                     Ther Activity                                       Gait Training                                       Modalities

## 2022-12-06 ENCOUNTER — OFFICE VISIT (OUTPATIENT)
Dept: PHYSICAL THERAPY | Facility: MEDICAL CENTER | Age: 67
End: 2022-12-06

## 2022-12-06 DIAGNOSIS — M79.662 PAIN OF LEFT CALF: Primary | ICD-10-CM

## 2022-12-06 DIAGNOSIS — M43.16 SPONDYLOLISTHESIS OF LUMBAR REGION: ICD-10-CM

## 2022-12-06 NOTE — PROGRESS NOTES
Daily Note     Today's date: 2022  Patient name: Autumn Fulton  : 1955  MRN: [id number]  Referring provider: Nichol Ayala MD  Dx:   Encounter Diagnosis     ICD-10-CM    1  Pain of left calf  M79 662       2  Spondylolisthesis of lumbar region  M43 16                      Subjective: Patient reports that he has mild discomfort in his calf at times, but he has not been having any pain  He reports that he was able to disc golf the day after his last PT session, and he felt pretty good  He will be returning to light soccer this upcoming weekend  Objective: See treatment diary below      Assessment: Continued with manual interventions to address tibial IR hypomobility with patient reporting decreased stiffness post-tx  Patient was encouraged to practice avoiding toe out on R LE when ambulating to prevent recurrence of tibial hypomobility  Patient demonstrated good technique with all LE flexibility and ankle strengthening interventions  Able to progress reps for SL heel raises, which demonstrates an improvement in CKC strength  Patient demonstrated appropriate fatigue post-tx and was able to complete program without pain  Patient would benefit from continued PT to further improve knee mobility and progress strengthening to facilitate a safe return to soccer  Plan: Continue per plan of care  Precautions: hx of TIA, LOOP RECORDER, patent foramen ovale, on Eliquis/Aspirin, HTN, grade I anterolisthesis L4-L5    *NO IASTM or ESTIM      Manuals 10/24 10/27 10/31 11/10 11/29 12/1 12/6      HS stretching NV            L fibular head mobs NV KP Gr  III (posterior) KP Gr  III (posterior) KP Gr  III-IV (posterior)         L tibial rotational mobs NV KP seated Gr  III  KP seated Gr  III (IR focus) KP seated Gr  III-IV (IR focus) KP prone Gr  III-IV (IR focus) KP prone Gr  III-IV (IR focus) KP prone Gr  III-IV (IR focus)      Re-evaluation     KP        Neuro Re-Ed             Supine sciatic nerve glides? Ther Ex             Rec bike NV 5' 5' 5' 5' 10' 10'      Strap gastroc stretch 30"x4 HEP 30"x4 30"x4 30"x4 30"x4 30"x4 30"x4      Long sit HS stretch 30"x4 HEP 30"x4 30"x4 30"x4 30"x4 30"x4 30"x4      4-way ankle tband NV 3"x20 GTB 3"x20 GTB 3"x20 blue 3"x20 black 5"x30 black 5"x30 black      Heel raises NV 5"x30 seated 5"x20 stand + INV with ball 5"x20 stand (no INV) during assessment 5"x30 DL, 5"x10 SL 5"x30 DL, 5"x20 SL      Wall gastroc stretch  30"x3 30"x4 30"x4 30"x4 30"x4 30"x4      Wall soleus stretch   20"x3 30"x4 30"x4 30"x4 30"x4      SKTC?              HEP education and instruction/activity modification instruction x8'    x5'                     Ther Activity                                       Gait Training                                       Modalities

## 2022-12-08 ENCOUNTER — APPOINTMENT (OUTPATIENT)
Dept: PHYSICAL THERAPY | Facility: MEDICAL CENTER | Age: 67
End: 2022-12-08

## 2022-12-09 ENCOUNTER — OFFICE VISIT (OUTPATIENT)
Dept: PHYSICAL THERAPY | Facility: MEDICAL CENTER | Age: 67
End: 2022-12-09

## 2022-12-09 DIAGNOSIS — M43.16 SPONDYLOLISTHESIS OF LUMBAR REGION: ICD-10-CM

## 2022-12-09 DIAGNOSIS — M79.662 PAIN OF LEFT CALF: Primary | ICD-10-CM

## 2022-12-09 NOTE — PROGRESS NOTES
Daily Note     Today's date: 2022  Patient name: Tory Hyman  : 1955  MRN: [id number]  Referring provider: Madelyn Lamas MD  Dx:   Encounter Diagnosis     ICD-10-CM    1  Pain of left calf  M79 662       2  Spondylolisthesis of lumbar region  M43 16                      Subjective: Patient reports that he is feeling really good with no pain or soreness after last session  He will be returning to soccer this upcoming weekend  Objective: See treatment diary below      Assessment: Continued with manual interventions to address tibial rotational hypomobility with patient reporting decreased stiffness when ambulating post-tx  Continued with gastroc/HS flexibility and gentle ankle strengthening program within a pain-free range  Cueing was provided to prevent foot ER during resisted ankle DF with tband  Held progressions for strengthening to prevent overload prior to return to soccer tomorrow  Patient tolerated treatment well and was able to complete program without pain  Patient would benefit from continued PT to further improve tibial mobility, LE flexibility, and to progress strengthening to facilitate a full return to soccer  Plan: Continue per plan of care  Precautions: hx of TIA, LOOP RECORDER, patent foramen ovale, on Eliquis/Aspirin, HTN, grade I anterolisthesis L4-L5    *NO IASTM or ESTIM      Manuals 10/24 10/27 10/31 11/10 11/29 12/1 12/6 12/9     HS stretching NV            L fibular head mobs NV KP Gr  III (posterior) KP Gr  III (posterior) KP Gr  III-IV (posterior)         L tibial rotational mobs NV KP seated Gr  III  KP seated Gr  III (IR focus) KP seated Gr  III-IV (IR focus) KP prone Gr  III-IV (IR focus) KP prone Gr  III-IV (IR focus) KP prone Gr  III-IV (IR focus) KP prone Gr  III-IV (IR focus)     Re-evaluation     KP        Neuro Re-Ed             Supine sciatic nerve glides?                                                                                            Ther Ex Rec bike NV 5' 5' 5' 5' 10' 10' 10'     Strap gastroc stretch 30"x4 HEP 30"x4 30"x4 30"x4 30"x4 30"x4 30"x4 30"x4     Long sit HS stretch 30"x4 HEP 30"x4 30"x4 30"x4 30"x4 30"x4 30"x4 30"x4     4-way ankle tband NV 3"x20 GTB 3"x20 GTB 3"x20 blue 3"x20 black 5"x30 black 5"x30 black 5"x30 black     Heel raises NV 5"x30 seated 5"x20 stand + INV with ball 5"x20 stand (no INV) during assessment 5"x30 DL, 5"x10 SL 5"x30 DL, 5"x20 SL 5"x30 DL, 5"x30 SL     Wall gastroc stretch  30"x3 30"x4 30"x4 30"x4 30"x4 30"x4 30"x4     Wall soleus stretch   20"x3 30"x4 30"x4 30"x4 30"x4 30"x4     SKTC?              HEP education and instruction/activity modification instruction x8'    x5'                     Ther Activity                                       Gait Training                                       Modalities

## 2022-12-13 ENCOUNTER — OFFICE VISIT (OUTPATIENT)
Dept: PHYSICAL THERAPY | Facility: MEDICAL CENTER | Age: 67
End: 2022-12-13

## 2022-12-13 DIAGNOSIS — M43.16 SPONDYLOLISTHESIS OF LUMBAR REGION: ICD-10-CM

## 2022-12-13 DIAGNOSIS — M79.662 PAIN OF LEFT CALF: Primary | ICD-10-CM

## 2022-12-13 NOTE — PROGRESS NOTES
Daily Note     Today's date: 2022  Patient name: Manuel Nixon  : 1955  MRN: [id number]  Referring provider: Radha Duenas MD  Dx:   Encounter Diagnosis     ICD-10-CM    1  Pain of left calf  M79 662       2  Spondylolisthesis of lumbar region  M43 16                      Subjective: Patient reports that he was able to return to soccer over this past weekend, and he did not have any stiffness or pain in his calf or knee  He is very pleased with his overall progress  Objective: See treatment diary below      Assessment: Continued with manual interventions to address mild remaining tibial IR rotational hypomobility  Patient continues to demonstrate improved quality of gait mechanics with less tibial ER noted on L LE when ambulating post-tx  Continued with ankle flexibility and strengthening program as outlined below  Able to progress reps for DL and SL heel raises, which demonstrates an improvement in strength  All interventions were performed in a pain-free range  Patient would benefit from continued PT to further improve knee mobility and progress CKC strengthening to prevent recurrence of symptoms when playing soccer  Plan: Continue per plan of care  Precautions: hx of TIA, LOOP RECORDER, patent foramen ovale, on Eliquis/Aspirin, HTN, grade I anterolisthesis L4-L5    *NO IASTM or ESTIM      Manuals 10/24 10/27 10/31 11/10 11/29 12/1 12/6 12/9 12/13    HS stretching NV            L fibular head mobs NV KP Gr  III (posterior) KP Gr  III (posterior) KP Gr  III-IV (posterior)         L tibial rotational mobs NV KP seated Gr  III  KP seated Gr  III (IR focus) KP seated Gr  III-IV (IR focus) KP prone Gr  III-IV (IR focus) KP prone Gr  III-IV (IR focus) KP prone Gr  III-IV (IR focus) KP prone Gr  III-IV (IR focus) KP prone Gr  III-IV (IR focus)    Re-evaluation     KP        Neuro Re-Ed             Supine sciatic nerve glides? Ther Ex             Rec bike NV 5' 5' 5' 5' 10' 10' 10' 10'    Strap gastroc stretch 30"x4 HEP 30"x4 30"x4 30"x4 30"x4 30"x4 30"x4 30"x4 30"x4    Long sit HS stretch 30"x4 HEP 30"x4 30"x4 30"x4 30"x4 30"x4 30"x4 30"x4 30"x4    4-way ankle tband NV 3"x20 GTB 3"x20 GTB 3"x20 blue 3"x20 black 5"x30 black 5"x30 black 5"x30 black 5"x30 black    Heel raises NV 5"x30 seated 5"x20 stand + INV with ball 5"x20 stand (no INV) during assessment 5"x30 DL, 5"x10 SL 5"x30 DL, 5"x20 SL 5"x30 DL, 5"x30 SL 5"x35 DL, 5"x35 SL    Wall gastroc stretch  30"x3 30"x4 30"x4 30"x4 30"x4 30"x4 30"x4 30"x4    Wall soleus stretch   20"x3 30"x4 30"x4 30"x4 30"x4 30"x4 30"x4    SKTC?              HEP education and instruction/activity modification instruction x8'    x5'                     Ther Activity                                       Gait Training                                       Modalities

## 2022-12-15 ENCOUNTER — OFFICE VISIT (OUTPATIENT)
Dept: PHYSICAL THERAPY | Facility: MEDICAL CENTER | Age: 67
End: 2022-12-15

## 2022-12-15 DIAGNOSIS — M43.16 SPONDYLOLISTHESIS OF LUMBAR REGION: ICD-10-CM

## 2022-12-15 DIAGNOSIS — M79.662 PAIN OF LEFT CALF: Primary | ICD-10-CM

## 2022-12-15 NOTE — PROGRESS NOTES
Daily Note     Today's date: 12/15/2022  Patient name: Autumn Fulton  : 1955  MRN: [id number]  Referring provider: Nichol Ayala MD  Dx:   Encounter Diagnosis     ICD-10-CM    1  Pain of left calf  M79 662       2  Spondylolisthesis of lumbar region  M43 16                      Subjective: Patient reports that his calf is continuing to feel good with no pain since last session  He is planning on playing soccer again this upcoming weekend  Objective: See treatment diary below      Assessment: Patient continues to respond well to prone tibiofemoral distraction with report of decreased stiffness when ambulating post-tx  Cueing provided to prevent excessive tibial ER on L LE when ambulating throughout session  Able to progress reps for 4-way ankle tband and DL heel raises, which demonstrates an improvement in ankle endurance  Decreased reps for SL heel raises due to fatigue at end of session  Patient tolerated treatment well and was able to complete program without pain  Will monitor response to playing soccer next visit and progress mobility/strengthening program as appropriate  Patient would benefit from continued PT to further improve knee/ankle mobility and strength to prevent recurrence of symptoms when playing soccer  Plan: Continue per plan of care  Progress as appropriate  Precautions: hx of TIA, LOOP RECORDER, patent foramen ovale, on Eliquis/Aspirin, HTN, grade I anterolisthesis L4-L5    *NO IASTM or ESTIM      Manuals 12/6 12/9 12/13 12/15   L tibial rotational mobs KP prone Gr  III-IV (IR focus) KP prone Gr  III-IV (IR focus) KP prone Gr  III-IV (IR focus) KP prone Gr  III-IV (IR focus)   Re-evaluation       Neuro Re-Ed       Supine sciatic nerve glides?                                                  Ther Ex       Rec bike 10' 10' 10' 10'   Strap gastroc stretch 30"x4 30"x4 30"x4 30"x4   Long sit HS stretch 30"x4 30"x4 30"x4 30"x4   4-way ankle tband 5"x30 black 5"x30 black 5"x30 black 5"x35 black    Heel raises 5"x30 DL, 5"x20 SL 5"x30 DL, 5"x30 SL 5"x35 DL, 5"x35 SL 5"x40 DL, 3x10 SL   Wall gastroc stretch 30"x4 30"x4 30"x4 30"x4   Wall soleus stretch 30"x4 30"x4 30"x4 30"x4   SKTC?        HEP education and instruction/activity modification instruction              Ther Activity                     Gait Training                     Modalities

## 2022-12-20 ENCOUNTER — OFFICE VISIT (OUTPATIENT)
Dept: PHYSICAL THERAPY | Facility: MEDICAL CENTER | Age: 67
End: 2022-12-20

## 2022-12-20 DIAGNOSIS — M79.662 PAIN OF LEFT CALF: Primary | ICD-10-CM

## 2022-12-20 DIAGNOSIS — M43.16 SPONDYLOLISTHESIS OF LUMBAR REGION: ICD-10-CM

## 2022-12-20 NOTE — PROGRESS NOTES
Discharge Summary     Today's date: 2022  Patient name: Elayne Ziegler    : 1955  MRN: [id number]  Referring provider: Krystle Rodriguez MD  Dx:   Encounter Diagnosis     ICD-10-CM    1  Pain of left calf  M79 662       2  Spondylolisthesis of lumbar region  M43 16                      Subjective: Patient reports that his calf has been feeling really good with no discomfort and no pain  He has been able to walk and play disc golf without any difficulty  He also returned to soccer last week without any pain  He is very pleased with his overall progress and feels ready to be discharged to his home program       Objective: See treatment diary below    Outcome measure:  FOTO: 76/100 on 10/24/22, 90/100 on 22    Assessment: Patient has demonstrated good progress with improving L tibiofemoral IR joint mobility, which has improved his quality of gait mechanics when ambulating longer distances  This has facilitated an improvement in gastroc/soleus/HS flexibility and has allowed him to achieve positioning for disc golfing without pain  Patient also has improved his multiplanar L ankle strength, which has allowed him to return to soccer without symptoms  Patient has met all of his goals for PT and has reached his maximum benefit from formal PT services  Patient is independent in a comprehensive illustrated HEP for continued ankle/knee mobility and progressive strengthening, and he is now discharged to his HEP  Goals  STG:  Patient will be independent with home exercise program - met  Patient will increase flexibility of L HS to improve ambulation distance  - met  LTG:  Patient will increase strength of L ankle plantarflexors to 5/5 to be able to walk long community distances  - met  Patient will exceed MCID on FOTO to demonstrate improved function  - met  Patient will be able to walk greater than 1 mile  - met  Patient will be able to disc golf  - met  Patient will be able to manage symptoms independently  - met    Plan: Discharge to HEP  Precautions: hx of TIA, LOOP RECORDER, patent foramen ovale, on Eliquis/Aspirin, HTN, grade I anterolisthesis L4-L5    *NO IASTM or ESTIM      Manuals 12/6 12/9 12/13 12/15 12/20   L tibial rotational mobs KP prone Gr  III-IV (IR focus) KP prone Gr  III-IV (IR focus) KP prone Gr  III-IV (IR focus) KP prone Gr  III-IV (IR focus) KP prone Gr  III-IV (IR focus)   Re-evaluation        Neuro Re-Ed        Supine sciatic nerve glides? Ther Ex        Rec bike 10' 10' 10' 10' 10'   Strap gastroc stretch 30"x4 30"x4 30"x4 30"x4 30"x4   Long sit HS stretch 30"x4 30"x4 30"x4 30"x4 30"x4   4-way ankle tband 5"x30 black 5"x30 black 5"x30 black 5"x35 black  5"x30 black   Heel raises 5"x30 DL, 5"x20 SL 5"x30 DL, 5"x30 SL 5"x35 DL, 5"x35 SL 5"x40 DL, 3x10 SL 5"x40 DL, 3x10 SL   Wall gastroc stretch 30"x4 30"x4 30"x4 30"x4 30"x4   Wall soleus stretch 30"x4 30"x4 30"x4 30"x4 30"x4   SKTC?         HEP education and instruction/activity modification instruction     x8'           Ther Activity                        Gait Training                        Modalities

## 2022-12-22 ENCOUNTER — APPOINTMENT (OUTPATIENT)
Dept: PHYSICAL THERAPY | Facility: MEDICAL CENTER | Age: 67
End: 2022-12-22

## 2022-12-23 DIAGNOSIS — N13.8 BPH WITH URINARY OBSTRUCTION: ICD-10-CM

## 2022-12-23 DIAGNOSIS — N40.1 BPH WITH URINARY OBSTRUCTION: ICD-10-CM

## 2022-12-23 RX ORDER — TAMSULOSIN HYDROCHLORIDE 0.4 MG/1
CAPSULE ORAL
Qty: 90 CAPSULE | Refills: 0 | Status: SHIPPED | OUTPATIENT
Start: 2022-12-23

## 2022-12-27 ENCOUNTER — APPOINTMENT (OUTPATIENT)
Dept: PHYSICAL THERAPY | Facility: MEDICAL CENTER | Age: 67
End: 2022-12-27

## 2022-12-27 ENCOUNTER — RA CDI HCC (OUTPATIENT)
Dept: OTHER | Facility: HOSPITAL | Age: 67
End: 2022-12-27

## 2022-12-27 NOTE — PROGRESS NOTES
[address]  coding opportunities       Chart reviewed, no opportunity found: CHART REVIEWED, NO OPPORTUNITY FOUND        Patients Insurance     Medicare Insurance: Medicare

## 2022-12-29 ENCOUNTER — APPOINTMENT (OUTPATIENT)
Dept: PHYSICAL THERAPY | Facility: MEDICAL CENTER | Age: 67
End: 2022-12-29

## 2022-12-30 ENCOUNTER — TELEPHONE (OUTPATIENT)
Dept: NEUROLOGY | Facility: CLINIC | Age: 67
End: 2022-12-30

## 2022-12-30 ENCOUNTER — TELEPHONE (OUTPATIENT)
Dept: FAMILY MEDICINE CLINIC | Facility: CLINIC | Age: 67
End: 2022-12-30

## 2022-12-30 ENCOUNTER — OFFICE VISIT (OUTPATIENT)
Dept: FAMILY MEDICINE CLINIC | Facility: CLINIC | Age: 67
End: 2022-12-30

## 2022-12-30 VITALS
HEIGHT: 67 IN | DIASTOLIC BLOOD PRESSURE: 70 MMHG | WEIGHT: 178 LBS | BODY MASS INDEX: 27.94 KG/M2 | TEMPERATURE: 97.4 F | SYSTOLIC BLOOD PRESSURE: 134 MMHG

## 2022-12-30 DIAGNOSIS — N40.1 BPH WITH URINARY OBSTRUCTION: ICD-10-CM

## 2022-12-30 DIAGNOSIS — Z23 ENCOUNTER FOR IMMUNIZATION: ICD-10-CM

## 2022-12-30 DIAGNOSIS — Z86.73 HISTORY OF TRANSIENT ISCHEMIC ATTACK (TIA): Primary | ICD-10-CM

## 2022-12-30 DIAGNOSIS — G47.33 OBSTRUCTIVE SLEEP APNEA SYNDROME: ICD-10-CM

## 2022-12-30 DIAGNOSIS — Z86.73 HISTORY OF TRANSIENT ISCHEMIC ATTACK (TIA): ICD-10-CM

## 2022-12-30 DIAGNOSIS — E66.3 OVERWEIGHT: ICD-10-CM

## 2022-12-30 DIAGNOSIS — R41.89 COGNITIVE AND BEHAVIORAL CHANGES: ICD-10-CM

## 2022-12-30 DIAGNOSIS — N13.8 BPH WITH URINARY OBSTRUCTION: ICD-10-CM

## 2022-12-30 DIAGNOSIS — E78.2 MIXED HYPERLIPIDEMIA: ICD-10-CM

## 2022-12-30 DIAGNOSIS — Q21.12 PATENT FORAMEN OVALE WITH RIGHT TO LEFT SHUNT: ICD-10-CM

## 2022-12-30 DIAGNOSIS — R46.89 COGNITIVE AND BEHAVIORAL CHANGES: ICD-10-CM

## 2022-12-30 DIAGNOSIS — I10 ESSENTIAL HYPERTENSION: Primary | ICD-10-CM

## 2022-12-30 PROBLEM — I73.9 CLAUDICATION OF CALF MUSCLES (HCC): Status: RESOLVED | Noted: 2022-08-23 | Resolved: 2022-12-30

## 2022-12-30 PROBLEM — M79.662 PAIN OF LEFT CALF: Status: RESOLVED | Noted: 2022-08-23 | Resolved: 2022-12-30

## 2022-12-30 PROBLEM — R35.1 NOCTURIA: Status: RESOLVED | Noted: 2019-09-23 | Resolved: 2022-12-30

## 2022-12-30 NOTE — PATIENT INSTRUCTIONS
Transient Ischemic Attack   AMBULATORY CARE:   A transient ischemic attack (TIA) , or mini-stroke, happens when blood cannot flow to part of the brain  A TIA only lasts minutes to hours and does not cause lasting damage  It is still important to get immediate medical care  A TIA may be a warning that you are about to have an ischemic stroke  An ischemic stroke happens when blood flow to the brain is suddenly blocked, usually by a blood clot  Warning signs of a stroke: The words BE FAST can help you remember and recognize warning signs of a stroke:  B = Balance:  Sudden loss of balance    E = Eyes:  Loss of vision in one or both eyes    F = Face:  Face droops on one side    A = Arms:  Arm drops when both arms are raised    S = Speech:  Speech is slurred or sounds different    T = Time:  Time to get help immediately       Signs and symptoms  are usually gone within an hour after a TIA  Any of the following may happen suddenly:  Numb or weak areas of your face, arm, or leg, or paralysis on one side of your body    Trouble walking or keeping your balance    Dizziness or a severe headache    Slurred speech, trouble talking, or not understanding language    Blurry or double vision, or sudden blindness in one or both eyes    Call your local emergency number (911 in the [address] [facility]) or have someone else call if:   You have any of the following signs of a stroke:      Numbness or drooping on one side of your face     Weakness in an arm or leg    Confusion or difficulty speaking    Dizziness, a severe headache, or vision loss       You have a seizure  You have chest pain or shortness of breath  You cough up blood  Seek care immediately if:   Your arm or leg feels warm, tender, and painful  It may look swollen and red  You have unusual or heavy bleeding  You have a severe headache or feel dizzy      Call your doctor or neurologist if:   Your blood pressure or blood sugar level is higher or lower than you were told it should be  You have questions or concerns about your condition or care  Treatment:  A TIA does not need to be treated  The following may be used to treat the cause of your TIA to prevent a stroke:  Medicines  may be used to prevent blood clots from forming  Other medicines may be needed to treat diabetes, depression, high cholesterol, or blood pressure problems  You may also need medicine to decrease the pressure in your brain, reduce pain, or prevent seizures  Surgery  may be needed to open a blocked artery (blood vessel)  Blocked carotid arteries cause poor blood flow to the brain or heart  What you can do to prevent another TIA or a stroke:   Manage health conditions  A condition such as diabetes can increase your risk for a stroke  Control your blood sugar level if you have hyperglycemia or diabetes  Take your prescribed medicines and check your blood sugar level as directed  Check your blood pressure as directed  High blood pressure can increase your risk for a stroke  If you have high blood pressure, follow your healthcare provider's directions for controlling your blood pressure  Do not use nicotine products or illegal drugs  Nicotine and other chemicals in cigarettes and cigars can cause blood vessel damage  Nicotine and illegal drugs both increase your risk for a stroke  Ask your healthcare provider for information if you currently smoke or use drugs and need help to quit  E- cigarettes or smokeless tobacco still contain nicotine  Talk to your healthcare provider before you use these products  Talk to your healthcare provider about alcohol  Alcohol can raise your blood pressure  The recommended limit is 2 drinks in a day for men and 1 drink in a day for women  Do not binge drink or save a week's worth of alcohol to drink in 1 or 2 days  Limit weekly amounts as directed by your provider  Eat a variety of healthy foods    Healthy foods include whole-grain breads, low-fat dairy products, beans, lean meats, and fish  Eat at least 5 servings of fruits and vegetables each day  Choose foods that are low in fat, cholesterol, salt, and sugar  Eat foods that are high in potassium, such as potatoes and bananas  A dietitian can help you create healthy meal plans  Maintain a healthy weight  Ask your healthcare provider how much you should weigh  Ask him or her to help you create a weight loss plan if you are overweight  He or she can help you create small goals if you have a lot of weight to lose  Exercise as directed  Exercise can lower your blood pressure, cholesterol, weight, and blood sugar levels  Healthcare providers will help you create exercise goals  They can also help you make a plan to reach your goals  For example, you can break exercise into 10 minute periods, 3 times in the day  Find an exercise that you enjoy  This will make it easier for you to reach your exercise goals  Manage stress  Stress can raise your blood pressure  Find ways to relax, such as deep breathing or listening to music  Follow up with your doctor or neurologist in 1 to 2 days:  Write down your questions so you remember to ask them during your visits  © Copyright [employer] 2022 Information is for End User's use only and may not be sold, redistributed or otherwise used for commercial purposes  All illustrations and images included in CareNotes® are the copyrighted property of A D A M , Inc  or Corinna Castillo  The above information is an  only  It is not intended as medical advice for individual conditions or treatments  Talk to your doctor, nurse or pharmacist before following any medical regimen to see if it is safe and effective for you

## 2022-12-30 NOTE — PROGRESS NOTES
Name: Selam Pantoja      : 1955      MRN: [id number]  Encounter Provider: Tomas Stewart DO  Encounter Date: 2022   Encounter department: [facility] PRIMARY CARE    Assessment & Plan     1  Essential hypertension  Assessment & Plan:  Blood pressure is controlled continue emds and followup in 4 months      2  Encounter for immunization  -     Pneumococcal Conjugate Vaccine 20-valent (PCV20)    3  Mixed hyperlipidemia  Assessment & Plan:  LDL goal is <80 Patient to continue medication Patient to have labs done      4  History of transient ischemic attack (TIA)  Assessment & Plan:  followup with cardiology and neurology Discussed that modification of risk factors controlling BP and lipids as well as using CPAP is needed Patient to see me in 3 months       5  Overweight  Assessment & Plan:  Discussed diet and patient to continue exercise      6  Patent foramen ovale with right to left shunt  Assessment & Plan:  Patient loop recorder shows no atrial Fibrillation he will have ABRIL in 2023 and see cardiology      7  Obstructive sleep apnea syndrome  Assessment & Plan:  Doing well on CPAP Patient to continue that      8   BPH with urinary obstruction  Assessment & Plan:  Continue flomax           Subjective      Patient is here for followup of hypertension sleep apnea hyperlipidemia History of TIA patent foramen ovale with right to left shunt and BPH Patient ahs not had followup with neurology about his memory yet He is scheduling that His cardiology visit was reviewd He will have some testing done in 2023 He has not had any Atrial fib per the loop recorder His blood pressure is good He is on statin and will have labs done He is using CPAP Patient is back to soccer and exercise his calf pain is gone Patient bowels and bladder are also doing well Patient is kind of concerned that no one can tell him exactly why he had TIA I again explained that he has risk factors and taht is likely cause However we have cardiology ruling out the heart as  structural cause His neurology team already ruled out stuctural issues in the brain Patient blood pressure is controlled Lipids are also good      Chief Complaint   Patient presents with   • Follow-up   • Hypertension       Review of Systems   Constitutional: Negative for fatigue, fever and unexpected weight change  HENT: Negative for congestion, sinus pain and trouble swallowing  Eyes: Negative for discharge and visual disturbance  Respiratory: Negative for cough, chest tightness, shortness of breath and wheezing  Cardiovascular: Negative for chest pain, palpitations and leg swelling  Gastrointestinal: Negative for abdominal pain, blood in stool, constipation, diarrhea, nausea and vomiting  Genitourinary: Negative for difficulty urinating, dysuria, frequency and hematuria  Musculoskeletal: Negative for arthralgias, gait problem and joint swelling  Skin: Negative for rash and wound  Allergic/Immunologic: Negative for environmental allergies and food allergies  Neurological: Negative for dizziness, syncope, weakness, numbness and headaches  Hematological: Negative for adenopathy  Does not bruise/bleed easily  Psychiatric/Behavioral: Positive for decreased concentration  Negative for confusion and sleep disturbance  The patient is not nervous/anxious           Feels his memory is not as good as it was pre TIA       Current Outpatient Medications on File Prior to Visit   Medication Sig   • apixaban (Eliquis) 5 mg Take 1 tablet (5 mg total) by mouth 2 (two) times a day   • atorvastatin (LIPITOR) 40 mg tablet Take 1 tablet (40 mg total) by mouth daily   • lisinopril (ZESTRIL) 5 mg tablet Take 1 tablet (5 mg total) by mouth daily   • multivitamin (THERAGRAN) TABS Take 1 tablet by mouth daily   • tamsulosin (FLOMAX) 0 4 mg TAKE ONE CAPSULE BY MOUTH DAILY wiht dinner   • [DISCONTINUED] methylPREDNISolone 4 MG tablet therapy pack Use as directed on package       Objective     /70   Temp (!) 97 4 °F (36 3 °C)   Ht 5' 7" (1 702 m)   Wt 80 7 kg (178 lb)   BMI 27 88 kg/m²     Physical Exam  Vitals and nursing note reviewed  Constitutional:       Appearance: Normal appearance  He is well-developed  HENT:      Head: Normocephalic and atraumatic  Right Ear: Hearing, tympanic membrane and external ear normal       Left Ear: Hearing, tympanic membrane and external ear normal    Eyes:      Extraocular Movements: Extraocular movements intact  Conjunctiva/sclera: Conjunctivae normal       Pupils: Pupils are equal, round, and reactive to light  Neck:      Thyroid: No thyromegaly  Cardiovascular:      Rate and Rhythm: Normal rate and regular rhythm  Heart sounds: Normal heart sounds  Pulmonary:      Effort: Pulmonary effort is normal       Breath sounds: Normal breath sounds  No wheezing or rales  Abdominal:      General: Bowel sounds are normal  There is no distension  Palpations: Abdomen is soft  Tenderness: There is no abdominal tenderness  Musculoskeletal:         General: No tenderness  Cervical back: Neck supple  Lymphadenopathy:      Cervical: No cervical adenopathy  Skin:     General: Skin is warm and dry  Findings: No rash  Neurological:      General: No focal deficit present  Mental Status: He is alert and oriented to person, place, and time  Cranial Nerves: No cranial nerve deficit  Coordination: Coordination normal    Psychiatric:         Mood and Affect: Mood normal          Behavior: Behavior normal          Thought Content:  Thought content normal          Judgment: Judgment normal        Silvana Greenwood DO

## 2022-12-30 NOTE — ASSESSMENT & PLAN NOTE
followup with cardiology and neurology Discussed that modification of risk factors controlling BP and lipids as well as using CPAP is needed Patient to see me in 3 months

## 2022-12-30 NOTE — TELEPHONE ENCOUNTER
Patient states he was seen earlier today and was told he should see a neurologist   He did not get any paperwork and needs to know who to call to schedule an appointment    Please advise, thank you

## 2022-12-31 LAB
ALBUMIN SERPL-MCNC: 3.8 G/DL (ref 3.6–5.1)
ALBUMIN/GLOB SERPL: 1.4 (CALC) (ref 1–2.5)
ALP SERPL-CCNC: 49 U/L (ref 35–144)
ALT SERPL-CCNC: 22 U/L (ref 9–46)
AST SERPL-CCNC: 20 U/L (ref 10–35)
BILIRUB SERPL-MCNC: 0.7 MG/DL (ref 0.2–1.2)
BUN SERPL-MCNC: 19 MG/DL (ref 7–25)
BUN/CREAT SERPL: NORMAL (CALC) (ref 6–22)
CALCIUM SERPL-MCNC: 9.3 MG/DL (ref 8.6–10.3)
CHLORIDE SERPL-SCNC: 107 MMOL/L (ref 98–110)
CHOLEST SERPL-MCNC: 115 MG/DL
CHOLEST/HDLC SERPL: 2.4 (CALC)
CO2 SERPL-SCNC: 30 MMOL/L (ref 20–32)
CREAT SERPL-MCNC: 1.25 MG/DL (ref 0.7–1.35)
GFR/BSA.PRED SERPLBLD CYS-BASED-ARV: 64 ML/MIN/1.73M2
GLOBULIN SER CALC-MCNC: 2.8 G/DL (CALC) (ref 1.9–3.7)
GLUCOSE SERPL-MCNC: 92 MG/DL (ref 65–99)
HDLC SERPL-MCNC: 48 MG/DL
LDLC SERPL CALC-MCNC: 54 MG/DL (CALC)
NONHDLC SERPL-MCNC: 67 MG/DL (CALC)
POTASSIUM SERPL-SCNC: 4.5 MMOL/L (ref 3.5–5.3)
PROT SERPL-MCNC: 6.6 G/DL (ref 6.1–8.1)
SODIUM SERPL-SCNC: 142 MMOL/L (ref 135–146)
TRIGL SERPL-MCNC: 58 MG/DL

## 2023-01-23 DIAGNOSIS — I10 ESSENTIAL HYPERTENSION: ICD-10-CM

## 2023-01-23 DIAGNOSIS — G45.9 TIA (TRANSIENT ISCHEMIC ATTACK): ICD-10-CM

## 2023-01-23 RX ORDER — LISINOPRIL 5 MG/1
TABLET ORAL
Qty: 90 TABLET | Refills: 0 | Status: SHIPPED | OUTPATIENT
Start: 2023-01-23

## 2023-01-23 RX ORDER — APIXABAN 5 MG/1
TABLET, FILM COATED ORAL
Qty: 180 TABLET | Refills: 0 | Status: SHIPPED | OUTPATIENT
Start: 2023-01-23 | End: 2023-01-24 | Stop reason: SDUPTHER

## 2023-01-24 DIAGNOSIS — I10 ESSENTIAL HYPERTENSION: ICD-10-CM

## 2023-01-24 DIAGNOSIS — G45.9 TIA (TRANSIENT ISCHEMIC ATTACK): ICD-10-CM

## 2023-01-24 RX ORDER — ATORVASTATIN CALCIUM 40 MG/1
40 TABLET, FILM COATED ORAL DAILY
Qty: 90 TABLET | Refills: 1 | Status: SHIPPED | OUTPATIENT
Start: 2023-01-24 | End: 2024-01-24

## 2023-02-05 NOTE — TELEPHONE ENCOUNTER
1st attempt to reach patient for referral   No answer  Left message on machine 
full range of motion in all extremities

## 2023-03-23 ENCOUNTER — TELEPHONE (OUTPATIENT)
Dept: FAMILY MEDICINE CLINIC | Facility: CLINIC | Age: 68
End: 2023-03-23

## 2023-03-23 ENCOUNTER — RA CDI HCC (OUTPATIENT)
Dept: OTHER | Facility: HOSPITAL | Age: 68
End: 2023-03-23

## 2023-03-23 NOTE — TELEPHONE ENCOUNTER
Pt had labs done recently, he has an upcoming appointment with you on 3/29/2023 he would like to know if he needs to have any other labs prior to his appointment   Thanks

## 2023-03-26 DIAGNOSIS — N40.1 BPH WITH URINARY OBSTRUCTION: ICD-10-CM

## 2023-03-26 DIAGNOSIS — N13.8 BPH WITH URINARY OBSTRUCTION: ICD-10-CM

## 2023-03-27 RX ORDER — TAMSULOSIN HYDROCHLORIDE 0.4 MG/1
CAPSULE ORAL
Qty: 90 CAPSULE | Refills: 0 | Status: SHIPPED | OUTPATIENT
Start: 2023-03-27

## 2023-03-29 ENCOUNTER — OFFICE VISIT (OUTPATIENT)
Dept: FAMILY MEDICINE CLINIC | Facility: CLINIC | Age: 68
End: 2023-03-29

## 2023-03-29 VITALS
WEIGHT: 181.8 LBS | SYSTOLIC BLOOD PRESSURE: 132 MMHG | BODY MASS INDEX: 28.53 KG/M2 | HEIGHT: 67 IN | DIASTOLIC BLOOD PRESSURE: 70 MMHG

## 2023-03-29 DIAGNOSIS — E78.2 MIXED HYPERLIPIDEMIA: ICD-10-CM

## 2023-03-29 DIAGNOSIS — Z12.5 SCREENING FOR PROSTATE CANCER: ICD-10-CM

## 2023-03-29 DIAGNOSIS — Z00.00 MEDICARE ANNUAL WELLNESS VISIT, SUBSEQUENT: Primary | ICD-10-CM

## 2023-03-29 DIAGNOSIS — N40.1 BPH WITH URINARY OBSTRUCTION: ICD-10-CM

## 2023-03-29 DIAGNOSIS — I10 ESSENTIAL HYPERTENSION: ICD-10-CM

## 2023-03-29 DIAGNOSIS — N13.8 BPH WITH URINARY OBSTRUCTION: ICD-10-CM

## 2023-03-29 DIAGNOSIS — G47.33 OBSTRUCTIVE SLEEP APNEA SYNDROME: ICD-10-CM

## 2023-03-29 DIAGNOSIS — Z86.73 HISTORY OF TRANSIENT ISCHEMIC ATTACK (TIA): ICD-10-CM

## 2023-03-29 DIAGNOSIS — E66.3 OVERWEIGHT: ICD-10-CM

## 2023-03-29 DIAGNOSIS — D12.6 TUBULAR ADENOMA OF COLON: ICD-10-CM

## 2023-03-29 NOTE — ASSESSMENT & PLAN NOTE
Patient loop monitor showed nothing He feels well His memory is back to normal Patient off the eliquis as loop monitor is normal Patient went in for PFO closure and they found via catherterization that there was no PFO so no procedure is needed

## 2023-03-29 NOTE — PATIENT INSTRUCTIONS
Medicare Preventive Visit Patient Instructions  Thank you for completing your Welcome to Medicare Visit or Medicare Annual Wellness Visit today  Your next wellness visit will be due in one year (3/29/2024)  The screening/preventive services that you may require over the next 5-10 years are detailed below  Some tests may not apply to you based off risk factors and/or age  Screening tests ordered at today's visit but not completed yet may show as past due  Also, please note that scanned in results may not display below  Preventive Screenings:  Service Recommendations Previous Testing/Comments   Colorectal Cancer Screening  · Colonoscopy    · Fecal Occult Blood Test (FOBT)/Fecal Immunochemical Test (FIT)  · Fecal DNA/Cologuard Test  · Flexible Sigmoidoscopy Age: 39-70 years old   Colonoscopy: every 10 years (May be performed more frequently if at higher risk)  OR  FOBT/FIT: every 1 year  OR  Cologuard: every 3 years  OR  Sigmoidoscopy: every 5 years  Screening may be recommended earlier than age 39 if at higher risk for colorectal cancer  Also, an individualized decision between you and your healthcare provider will decide whether screening between the ages of 74-80 would be appropriate   Colonoscopy: 08/13/2018  FOBT/FIT: Not on file  Cologuard: Not on file  Sigmoidoscopy: Not on file    Screening Current     Prostate Cancer Screening Individualized decision between patient and health care provider in men between ages of 53-78   Medicare will cover every 12 months beginning on the day after your 50th birthday PSA: 3 81 ng/mL     Screening Current     Hepatitis C Screening Once for adults born between 1945 and 1965  More frequently in patients at high risk for Hepatitis C Hep C Antibody: 04/30/2020    Screening Current   Diabetes Screening 1-2 times per year if you're at risk for diabetes or have pre-diabetes Fasting glucose: No results in last 5 years (No results in last 5 years)  A1C: 5 6 % (12/24/2021)  Screening Current   Cholesterol Screening Once every 5 years if you don't have a lipid disorder  May order more often based on risk factors  Lipid panel: 12/30/2022  Screening Not Indicated  History Lipid Disorder      Other Preventive Screenings Covered by Medicare:  1  Abdominal Aortic Aneurysm (AAA) Screening: covered once if your at risk  You're considered to be at risk if you have a family history of AAA or a male between the age of 73-68 who smoking at least 100 cigarettes in your lifetime  2  Lung Cancer Screening: covers low dose CT scan once per year if you meet all of the following conditions: (1) Age 50-69; (2) No signs or symptoms of lung cancer; (3) Current smoker or have quit smoking within the last 15 years; (4) You have a tobacco smoking history of at least 20 pack years (packs per day x number of years you smoked); (5) You get a written order from a healthcare provider  3  Glaucoma Screening: covered annually if you're considered high risk: (1) You have diabetes OR (2) Family history of glaucoma OR (3)  aged 48 and older OR (3)  American aged 72 and older  3  Osteoporosis Screening: covered every 2 years if you meet one of the following conditions: (1) Have a vertebral abnormality; (2) On glucocorticoid therapy for more than 3 months; (3) Have primary hyperparathyroidism; (4) On osteoporosis medications and need to assess response to drug therapy  5  HIV Screening: covered annually if you're between the age of 12-76  Also covered annually if you are younger than 13 and older than 72 with risk factors for HIV infection  For pregnant patients, it is covered up to 3 times per pregnancy      Immunizations:  Immunization Recommendations   Influenza Vaccine Annual influenza vaccination during flu season is recommended for all persons aged >= 6 months who do not have contraindications   Pneumococcal Vaccine   * Pneumococcal conjugate vaccine = PCV13 (Prevnar 13), PCV15 (Vaxneuvance), PCV20 (Prevnar 20)  * Pneumococcal polysaccharide vaccine = PPSV23 (Pneumovax) Adults 2364 years old: 1-3 doses may be recommended based on certain risk factors  Adults 72 years old: 1-2 doses may be recommended based off what pneumonia vaccine you previously received   Hepatitis B Vaccine 3 dose series if at intermediate or high risk (ex: diabetes, end stage renal disease, liver disease)   Tetanus (Td) Vaccine - COST NOT COVERED BY MEDICARE PART B Following completion of primary series, a booster dose should be given every 10 years to maintain immunity against tetanus  Td may also be given as tetanus wound prophylaxis  Tdap Vaccine - COST NOT COVERED BY MEDICARE PART B Recommended at least once for all adults  For pregnant patients, recommended with each pregnancy  Shingles Vaccine (Shingrix) - COST NOT COVERED BY MEDICARE PART B  2 shot series recommended in those aged 48 and above     Health Maintenance Due:      Topic Date Due   • Colorectal Cancer Screening  08/13/2023   • Hepatitis C Screening  Completed     Immunizations Due:      Topic Date Due   • COVID-19 Vaccine (4 - Booster for Villagran Peter series) 01/24/2022     Advance Directives   What are advance directives? Advance directives are legal documents that state your wishes and plans for medical care  These plans are made ahead of time in case you lose your ability to make decisions for yourself  Advance directives can apply to any medical decision, such as the treatments you want, and if you want to donate organs  What are the types of advance directives? There are many types of advance directives, and each state has rules about how to use them  You may choose a combination of any of the following:  · Living will: This is a written record of the treatment you want  You can also choose which treatments you do not want, which to limit, and which to stop at a certain time  This includes surgery, medicine, IV fluid, and tube feedings     · Durable power of  for [facility]): This is a written record that states who you want to make healthcare choices for you when you are unable to make them for yourself  This person, called a proxy, is usually a family member or a friend  You may choose more than 1 proxy  · Do not resuscitate (DNR) order:  A DNR order is used in case your heart stops beating or you stop breathing  It is a request not to have certain forms of treatment, such as CPR  A DNR order may be included in other types of advance directives  · Medical directive: This covers the care that you want if you are in a coma, near death, or unable to make decisions for yourself  You can list the treatments you want for each condition  Treatment may include pain medicine, surgery, blood transfusions, dialysis, IV or tube feedings, and a ventilator (breathing machine)  · Values history: This document has questions about your views, beliefs, and how you feel and think about life  This information can help others choose the care that you would choose  Why are advance directives important? An advance directive helps you control your care  Although spoken wishes may be used, it is better to have your wishes written down  Spoken wishes can be misunderstood, or not followed  Treatments may be given even if you do not want them  An advance directive may make it easier for your family to make difficult choices about your care  Weight Management   Why it is important to manage your weight:  Being overweight increases your risk of health conditions such as heart disease, high blood pressure, type 2 diabetes, and certain types of cancer  It can also increase your risk for osteoarthritis, sleep apnea, and other respiratory problems  Aim for a slow, steady weight loss  Even a small amount of weight loss can lower your risk of health problems  How to lose weight safely:  A safe and healthy way to lose weight is to eat fewer calories and get regular exercise   You can lose up about 1 pound a week by decreasing the number of calories you eat by 500 calories each day  Healthy meal plan for weight management:  A healthy meal plan includes a variety of foods, contains fewer calories, and helps you stay healthy  A healthy meal plan includes the following:  · Eat whole-grain foods more often  A healthy meal plan should contain fiber  Fiber is the part of grains, fruits, and vegetables that is not broken down by your body  Whole-grain foods are healthy and provide extra fiber in your diet  Some examples of whole-grain foods are whole-wheat breads and pastas, oatmeal, brown rice, and bulgur  · Eat a variety of vegetables every day  Include dark, leafy greens such as spinach, kale, nani greens, and mustard greens  Eat yellow and orange vegetables such as carrots, sweet potatoes, and winter squash  · Eat a variety of fruits every day  Choose fresh or canned fruit (canned in its own juice or light syrup) instead of juice  Fruit juice has very little or no fiber  · Eat low-fat dairy foods  Drink fat-free (skim) milk or 1% milk  Eat fat-free yogurt and low-fat cottage cheese  Try low-fat cheeses such as mozzarella and other reduced-fat cheeses  · Choose meat and other protein foods that are low in fat  Choose beans or other legumes such as split peas or lentils  Choose fish, skinless poultry (chicken or turkey), or lean cuts of red meat (beef or pork)  Before you cook meat or poultry, cut off any visible fat  · Use less fat and oil  Try baking foods instead of frying them  Add less fat, such as margarine, sour cream, regular salad dressing and mayonnaise to foods  Eat fewer high-fat foods  Some examples of high-fat foods include french fries, doughnuts, ice cream, and cakes  · Eat fewer sweets  Limit foods and drinks that are high in sugar  This includes candy, cookies, regular soda, and sweetened drinks    Exercise:  Exercise at least 30 minutes per day on most days of the week  Some examples of exercise include walking, biking, dancing, and swimming  You can also fit in more physical activity by taking the stairs instead of the elevator or parking farther away from stores  Ask your healthcare provider about the best exercise plan for you  © Copyright [employer] 2018 Information is for End User's use only and may not be sold, redistributed or otherwise used for commercial purposes   All illustrations and images included in CareNotes® are the copyrighted property of A D A M , Inc  or [address] [place] [address] [employer]

## 2023-03-29 NOTE — PROGRESS NOTES
Assessment and Plan:     Problem List Items Addressed This Visit        Digestive    Tubular adenoma of colon     Colonoscopy due in 2023 in August         Relevant Orders    Ambulatory Referral to Gastroenterology       Respiratory    Obstructive sleep apnea syndrome     Continue with CPAP follow up 6 months             Cardiovascular and Mediastinum    Essential hypertension     Patient blood pressure is stable continue current care and followup in 6 months            Genitourinary    BPH with urinary obstruction     Continue flomax Check PSA in June            Other    Screening for prostate cancer     Check PSA in June as scheduled         Relevant Orders    PSA, Total Screen    Overweight     Weight is stable continue current diet and exercise regimen         History of transient ischemic attack (TIA)     Patient loop monitor showed nothing He feels well His memory is back to normal Patient off the eliquis as loop monitor is normal Patient went in for PFO closure and they found via catherterization that there was no PFO so no procedure is needed         Mixed hyperlipidemia     Lipids stable Continue statin repeat labs June         Relevant Orders    Comprehensive metabolic panel    Lipid panel    Medicare annual wellness visit, subsequent - Primary     5 wishes given Check PSA in June Patient needs repeat colonoscopy in 8/2018        Other Visit Diagnoses     Memory loss            BMI Counseling: Body mass index is 28 47 kg/m²  The BMI is above normal  Nutrition recommendations include decreasing portion sizes, encouraging healthy choices of fruits and vegetables and moderation in carbohydrate intake  Exercise recommendations include moderate physical activity 150 minutes/week  Rationale for BMI follow-up plan is due to patient being overweight or obese  Depression Screening and Follow-up Plan: Patient was screened for depression during today's encounter   They screened negative with a PHQ-2 score of 0       Preventive health issues were discussed with patient, and age appropriate screening tests were ordered as noted in patient's After Visit Summary  Personalized health advice and appropriate referrals for health education or preventive services given if needed, as noted in patient's After Visit Summary  History of Present Illness:     Patient presents for a Medicare Wellness Visit    Patient is here for medicare wellness and follow up of hypertension hyperlipidemia history of TIA sleep apnea and tubular adenoma Patient is off eliquis no irregular beats found on loop monitor Patient also was found to not have a patent foramen ovale when they did the catheterization patient feels fine His memory is completely normal He is using CPAP Patient 12/2022 labs were reviewed and stable His BPH is also stable on flomax needs PSA in June Patient also needs colonoscopy as he had tubular adenoma in cecum in 2018 Patient needs to do advanced directives and living will with healthcare power of      Patient Care Team:  Brandy Lux DO as PCP - General (Family Medicine)  Brandy Lux DO as PCP - PCP-[facility]  Kinza Saavedra MD     Review of Systems:     Review of Systems     Problem List:     Patient Active Problem List   Diagnosis   • Essential hypertension   • Screening for prostate cancer   • Overweight   • Annual physical exam   • Obstructive sleep apnea syndrome   • BPH with urinary obstruction   • History of transient ischemic attack (TIA)   • Mixed hyperlipidemia   • Skin tags, multiple acquired   • Medicare annual wellness visit, subsequent   • Tubular adenoma of colon      Past Medical and Surgical History:     Past Medical History:   Diagnosis Date   • Hyperglycemia     Resolved 3/31/2016    • Hypertension      Past Surgical History:   Procedure Laterality Date   • CARDIAC LOOP RECORDER     • CARDIAC SURGERY     • COLONOSCOPY      Proctitis, otherwise nml  Dr Gil Lopez  Resolved 12/3/2008       Family History:     Family History   Problem Relation Age of Onset   • Dementia Mother    • Stroke Father    • Hypertension Father    • No Known Problems Sister    • No Known Problems Daughter    • No Known Problems Daughter    • No Known Problems Sister    • No Known Problems Sister    • No Known Problems Sister       Social History:     Social History     Socioeconomic History   • Marital status: /Civil Union     Spouse name: None   • Number of children: None   • Years of education: None   • Highest education level: None   Occupational History   • None   Tobacco Use   • Smoking status: Never   • Smokeless tobacco: Never   Vaping Use   • Vaping Use: Never used   Substance and Sexual Activity   • Alcohol use: Yes     Comment: Social    • Drug use: Never   • Sexual activity: Yes     Partners: Female     Birth control/protection: Post-menopausal     Comment: 2 kids 35 and 32   Other Topics Concern   • None   Social History Narrative   • None     Social Determinants of Health     Financial Resource Strain: Low Risk    • Difficulty of Paying Living Expenses: Not very hard   Food Insecurity: Not on file   Transportation Needs: No Transportation Needs   • Lack of Transportation (Medical): No   • Lack of Transportation (Non-Medical):  No   Physical Activity: Not on file   Stress: Not on file   Social Connections: Not on file   Intimate Partner Violence: Not on file   Housing Stability: Not on file      Medications and Allergies:     Current Outpatient Medications   Medication Sig Dispense Refill   • atorvastatin (LIPITOR) 40 mg tablet Take 1 tablet (40 mg total) by mouth daily 90 tablet 1   • lisinopril (ZESTRIL) 5 mg tablet TAKE ONE TABLET BY MOUTH ONCE DAILY 90 tablet 0   • multivitamin (THERAGRAN) TABS Take 1 tablet by mouth daily     • tamsulosin (FLOMAX) 0 4 mg TAKE ONE CAPSULE BY MOUTH ONCE DAILY WITH DINNER 90 capsule 0     No current facility-administered medications for this visit  No Known Allergies   Immunizations:     Immunization History   Administered Date(s) Administered   • COVID-19 PFIZER VACCINE 0 3 ML IM 03/27/2021, 04/17/2021, 11/29/2021   • COVID-19 Pfizer vac (Bruno-sucrose, gray cap) 12 yr+ IM 10/18/2022   • DT (pediatric) 11/27/2007   • Hepatitis A 12/23/1996   • Hepatitis B 04/24/1996   • INFLUENZA 11/04/2021   • IPV 07/01/2001   • Influenza Split High Dose Preservative Free IM 11/22/2022   • Influenza, injectable, quadrivalent, preservative free 0 5 mL 11/08/2019, 09/22/2020   • Pneumococcal Conjugate Vaccine 20-valent (Pcv20), Polysace 12/30/2022   • Td (adult), adsorbed 11/03/2007   • Tdap 05/24/2019   • Typhoid, Unspecified 05/01/1998, 10/25/2016   • Zoster Vaccine Recombinant 10/15/2020, 12/24/2020      Health Maintenance:         Topic Date Due   • Colorectal Cancer Screening  08/13/2023   • Hepatitis C Screening  Completed         Topic Date Due   • COVID-19 Vaccine (5 - Booster for Pfizer series) 12/13/2022      Medicare Screening Tests and Risk Assessments:     Debo Goldman is here for his Subsequent Wellness visit  Health Risk Assessment:   Patient rates overall health as very good  Patient feels that their physical health rating is same  Patient is satisfied with their life  Eyesight was rated as same  Hearing was rated as same  Patient feels that their emotional and mental health rating is same  Patients states they are never, rarely angry  Patient states they are never, rarely unusually tired/fatigued  Pain experienced in the last 7 days has been none  Patient states that he has experienced no weight loss or gain in last 6 months  Depression Screening:   PHQ-2 Score: 0      Fall Risk Screening: In the past year, patient has experienced: no history of falling in past year      Home Safety:  Patient does not have trouble with stairs inside or outside of their home  Patient has working smoke alarms and has working carbon monoxide detector   Home safety hazards include: none  Nutrition:   Current diet is Regular and Limited junk food  Medications:   Patient is currently taking over-the-counter supplements  OTC medications include: multi vitamins for Men  Patient is able to manage medications  Activities of Daily Living (ADLs)/Instrumental Activities of Daily Living (IADLs):   Walk and transfer into and out of bed and chair?: Yes  Dress and groom yourself?: Yes    Bathe or shower yourself?: Yes    Feed yourself? Yes  Do your laundry/housekeeping?: Yes  Manage your money, pay your bills and track your expenses?: Yes  Make your own meals?: Yes    Do your own shopping?: Yes    Previous Hospitalizations:   Any hospitalizations or ED visits within the last 12 months?: Yes    How many hospitalizations have you had in the last year?: 1-2    Hospitalization Comments: on 3/21/23 went to [facility] for PFO Closure, However, there were no hole(s) in the heart      Advance Care Planning:   Living will: No    Durable POA for healthcare: No    Advanced directive: No    Five wishes given: Yes      Cognitive Screening:   Provider or family/friend/caregiver concerned regarding cognition?: No    PREVENTIVE SCREENINGS      Cardiovascular Screening:    General: Screening Not Indicated and History Lipid Disorder      Diabetes Screening:     General: Screening Current      Colorectal Cancer Screening:     General: Screening Current      Prostate Cancer Screening:    General: Screening Current      Osteoporosis Screening:    General: Screening Not Indicated      Abdominal Aortic Aneurysm (AAA) Screening:    Risk factors include: age between 73-67 yo        General: Patient Declines      Lung Cancer Screening:     General: Screening Not Indicated      Hepatitis C Screening:    General: Screening Current    Screening, Brief Intervention, and Referral to Treatment (SBIRT)    Screening  Typical number of drinks in a day: 1  Typical number of drinks in a week: "2  Interpretation: Low risk drinking behavior  AUDIT-C Screenin) How often did you have a drink containing alcohol in the past year? monthly or less  2) How many drinks did you have on a typical day when you were drinking in the past year? 0  3) How often did you have 6 or more drinks on one occasion in the past year? never    AUDIT-C Score: 1  Interpretation: Score 0-3 (male): Negative screen for alcohol misuse    Single Item Drug Screening:  How often have you used an illegal drug (including marijuana) or a prescription medication for non-medical reasons in the past year? never    Single Item Drug Screen Score: 0  Interpretation: Negative screen for possible drug use disorder    No results found       Physical Exam:     /70   Ht 5' 7\" (1 702 m)   Wt 82 5 kg (181 lb 12 8 oz)   BMI 28 47 kg/m²     Physical Exam     Champ Holden DO  "

## 2023-05-28 PROBLEM — Z00.00 MEDICARE ANNUAL WELLNESS VISIT, SUBSEQUENT: Status: RESOLVED | Noted: 2023-03-29 | Resolved: 2023-05-28

## 2023-05-29 DIAGNOSIS — I10 ESSENTIAL HYPERTENSION: ICD-10-CM

## 2023-05-30 RX ORDER — LISINOPRIL 5 MG/1
TABLET ORAL
Qty: 90 TABLET | Refills: 0 | Status: SHIPPED | OUTPATIENT
Start: 2023-05-30

## 2023-06-22 ENCOUNTER — TELEPHONE (OUTPATIENT)
Age: 68
End: 2023-06-22

## 2023-06-22 ENCOUNTER — PREP FOR PROCEDURE (OUTPATIENT)
Age: 68
End: 2023-06-22

## 2023-06-22 DIAGNOSIS — Z86.010 HISTORY OF COLON POLYPS: Primary | ICD-10-CM

## 2023-06-22 NOTE — TELEPHONE ENCOUNTER
"[facility] Assessment    Name: Domenico December  YOB: 1955  Last Height: 5' 7\" (1 702 m)  Last weight: 82 5 kg (181 lb 12 8 oz)  BMI: 28 47 kg/m²  Procedure: colonoscopy  Diagnosis: history of polyps  Date of procedure: 8/29/23  Prep:   Responsible : ? Phone#: ?  Name completing form: Shyla Souza  Date form completed: 06/22/23      If the patient answers yes to any of these questions, schedule in a hospital  Are you pregnant: No  Do you rely on a wheelchair for mobility: No  Have you been diagnosed with End Stage Renal Disease (ESRD): No  Do you need oxygen during the day: No  Have you had a heart attack or stroke within the past three months: No  Have you had a seizure within the past three months: No  Have you ever been informed by anesthesia that you have a difficult airway: No  Additional Questions  Have you had any cardiac testing or are under the care of a Cardiologist (see cardiac list): Yes (Comment: Obtain Cardiac Clearance)  Cardiac list:   Do you have an implanted cardiac defibrillator: No (Comment: This patient should be scheduled in the hospital)    Have any bleeding problems, such as anemia or hemophilia (If patient has H&H result below 8, schedule in hospital   H&H must be within 30 days of procedure): No    Had an organ transplant within the past 3 months: No    Do you have any present infections: No  Do you get short of breath when walking a few blocks: No  Have you been diagnosed with diabetes: No  Comments (provide cardiac provider information if applicable): pt answered no to cardiac question   Pt has loop recorder       "

## 2023-06-22 NOTE — TELEPHONE ENCOUNTER
Scheduled date of colonoscopy (as of today):8/29/23    Physician performing colonoscopy:Dr Villela    Location of colonoscopy:[place]    Clearances: N/A

## 2023-06-22 NOTE — TELEPHONE ENCOUNTER
06/22/23  Screened by: Hetal Coleman    Referring Provider     Pre- Screening: There is no height or weight on file to calculate BMI  Has patient been referred for a routine screening Colonoscopy? yes  Is the patient between 39-70 years old? yes      Previous Colonoscopy yes   If yes:    Date: 2018    Facility:     Reason:       SCHEDULING STAFF: If the patient is between 39yrs-47yrs, please advise patient to confirm benefits/coverage with their insurance company for a routine screening colonoscopy, some insurance carriers will only cover at [id number] or older  If the patient is over 66years old, please schedule an office visit  Does the patient want to see a Gastroenterologist prior to their procedure OR are they having any GI symptoms? no    Has the patient been hospitalized or had abdominal surgery in the past 6 months? no    Does the patient use supplemental oxygen? no    Does the patient take Coumadin, Lovenox, Plavix, Elliquis, Xarelto, or other blood thinning medication? no    Has the patient had a stroke, cardiac event, or stent placed in the past year? no    SCHEDULING STAFF: If patient answers NO to above questions, then schedule procedure  If patient answers YES to above questions, then schedule office appointment  Pt passed OA    If patient is between 45yrs - 49yrs, please advise patient that we will have to confirm benefits & coverage with their insurance company for a routine screening colonoscopy

## 2023-06-26 ENCOUNTER — TELEPHONE (OUTPATIENT)
Dept: GASTROENTEROLOGY | Facility: CLINIC | Age: 68
End: 2023-06-26

## 2023-06-26 DIAGNOSIS — N40.1 BPH WITH URINARY OBSTRUCTION: ICD-10-CM

## 2023-06-26 DIAGNOSIS — N13.8 BPH WITH URINARY OBSTRUCTION: ICD-10-CM

## 2023-06-26 RX ORDER — TAMSULOSIN HYDROCHLORIDE 0.4 MG/1
CAPSULE ORAL
Qty: 90 CAPSULE | Refills: 0 | Status: SHIPPED | OUTPATIENT
Start: 2023-06-26

## 2023-07-27 ENCOUNTER — TELEPHONE (OUTPATIENT)
Dept: FAMILY MEDICINE CLINIC | Facility: CLINIC | Age: 68
End: 2023-07-27

## 2023-07-27 DIAGNOSIS — I10 ESSENTIAL HYPERTENSION: ICD-10-CM

## 2023-07-27 RX ORDER — ATORVASTATIN CALCIUM 40 MG/1
40 TABLET, FILM COATED ORAL DAILY
Qty: 90 TABLET | Refills: 0 | Status: SHIPPED | OUTPATIENT
Start: 2023-07-27

## 2023-08-23 DIAGNOSIS — Z12.11 SCREEN FOR COLON CANCER: Primary | ICD-10-CM

## 2023-08-25 ENCOUNTER — TELEPHONE (OUTPATIENT)
Age: 68
End: 2023-08-25

## 2023-08-31 ENCOUNTER — TELEPHONE (OUTPATIENT)
Dept: GASTROENTEROLOGY | Facility: MEDICAL CENTER | Age: 68
End: 2023-08-31

## 2023-08-31 NOTE — TELEPHONE ENCOUNTER
LVM attempting to confirm 9/15 procedure, informing that prep instructions sent via [employer], prep medication sent to pharmacy, and that a  is needed.

## 2023-09-01 ENCOUNTER — OFFICE VISIT (OUTPATIENT)
Dept: SLEEP CENTER | Facility: CLINIC | Age: 68
End: 2023-09-01
Payer: MEDICARE

## 2023-09-01 VITALS
RESPIRATION RATE: 18 BRPM | HEART RATE: 80 BPM | DIASTOLIC BLOOD PRESSURE: 80 MMHG | SYSTOLIC BLOOD PRESSURE: 120 MMHG | WEIGHT: 179 LBS | BODY MASS INDEX: 28.09 KG/M2 | HEIGHT: 67 IN | OXYGEN SATURATION: 98 %

## 2023-09-01 DIAGNOSIS — R40.0 DAYTIME SLEEPINESS: ICD-10-CM

## 2023-09-01 DIAGNOSIS — I10 ESSENTIAL HYPERTENSION: ICD-10-CM

## 2023-09-01 DIAGNOSIS — G47.33 OSA (OBSTRUCTIVE SLEEP APNEA): Primary | ICD-10-CM

## 2023-09-01 DIAGNOSIS — E66.3 OVERWEIGHT: ICD-10-CM

## 2023-09-01 PROCEDURE — 99214 OFFICE O/P EST MOD 30 MIN: CPT | Performed by: INTERNAL MEDICINE

## 2023-09-01 RX ORDER — LISINOPRIL 5 MG/1
5 TABLET ORAL DAILY
Qty: 90 TABLET | Refills: 0 | Status: SHIPPED | OUTPATIENT
Start: 2023-09-01

## 2023-09-01 NOTE — PATIENT INSTRUCTIONS

## 2023-09-01 NOTE — PROGRESS NOTES
Follow-Up Note - Sleep Center   Joanna Lazo  80 y.o. male  [facility]:[id number]  KFD:[id number]  DOS:9/1/2023    CC: I saw this patient for follow-up in clinic today for Sleep disordered breathing, Coexisting Sleep and Medical Problems. He was last seen in 2020. interval changes: Patient received ot a refurbished [place] Version 1 machine from [facility] several months ago. Results of home sleep testing in November of 2019 demonstrated JARROD (respiratory event index of) 24 /hour. Minimum oxygen saturation was 74%. 3.1% of the study was spent with saturations less than 90%. The snore index was 20.4%. Insurance declined a titration study and auto titrating Pap was initiated. PFSH, Problem List, Medications & Allergies were reviewed in EMR. He  has a past medical history of Hyperglycemia and Hypertension. He has a current medication list which includes the following prescription(s): atorvastatin, lisinopril, multivitamin, polyethylene glycol, and tamsulosin. PHYSIOLOGICAL DATA REVIEW : Using PAP > 4 hours/night 93%. Estimated JARROD 5.8/hour with pressure of 18cm [email or web address] percentile; patient has not been using non FDA approved devices to sanitize the machine. INTERPRETATION: Compliance is excellent; Pressure setting is:in acceptable range; ;   SUBJECTIVE: With respect to use of PAP, Anna Batista  is experiencing no adverse effects:. He derives benefit. Is satisfied with sleep and daytime function. Sleep Routine: Anna Batista reports getting 7 hrs sleep; he has no difficulty initiating or maintaining sleep . He arises spontaneously and feels refreshed. Anna Batista reports daytime sleepiness, is improved. He rated [himself] at Total score: 2 /24 on the [place] Sleepiness Scale. Other issues: None reported. Habits:[ reports that he has never smoked. He has never used smokeless tobacco.], [ reports current alcohol use.], [ reports no history of drug use.], Caffeine use:limited; Exercise routine: regular. ROS: Significant for weight has been stable. A 10 point review of systems was otherwise negative. EXAM: /80   Pulse 80   Resp 18   Ht 5' 7" (1.702 m)   Wt 81.2 kg (179 lb)   SpO2 98%   BMI 28.04 kg/m²     Wt Readings from Last 3 Encounters:   09/01/23 81.2 kg (179 lb)   03/29/23 82.5 kg (181 lb 12.8 oz)   12/30/22 80.7 kg (178 lb)      Patient is well groomed; well appearing. CNS: Alert, orientated, speech clear & coherent  Psych: cooperative and in no distress. Mental state: Appears normal.  H&N: EOMI; NC/AT: No facial pressure marks, no rashes. Skin/Extrem: col & hydration normal; no edema  Resp: Respiratory effort is normal  Physical findings otherwise essentially unchanged from previous. IMPRESSION: Problem List Items & Comorbidities Addressed this Visit    1. HAROON (obstructive sleep apnea)  PAP DME Pressure Change    PAP DME Resupply/Reorder      2. Daytime sleepiness        3. Essential hypertension        4. Overweight            PLAN:  1. I reviewed results of prior studies and physiologic data with the patient. 2. I discussed treatment options with risks and benefits. 3. Treatment with  PAP is medically necessary and Enmanuel Geff is agreable to continue use. 4. Care of equipment, methods to improve comfort using PAP and importance of compliance with therapy were discussed. 5. Pressure setting: adjust 16-20 cmH2O.    6. Rx provided to replace supplies and Care coordinated with DME provider for pressure adjustment in clinic today. 7. Discussed strategies for weight reduction. 8. Follow-up is advised in 1 year or sooner if needed to monitor progress, compliance and to adjust therapy. Thank you for allowing me to participate in the care of this patient. Sincerely,     Authenticated electronically on 05/62/34   Board Certified Specialist     Portions of the record may have been created with voice recognition software.  Occasional wrong word or "sound a like" substitutions may have occurred due to the inherent limitations of voice recognition software. There may also be notations and random deletions of words or characters from malfunctioning software. Read the chart carefully and recognize, using context, where substitutions/deletions have occurred.

## 2023-09-05 ENCOUNTER — TELEPHONE (OUTPATIENT)
Dept: SLEEP CENTER | Facility: CLINIC | Age: 68
End: 2023-09-05

## 2023-09-06 LAB

## 2023-09-13 ENCOUNTER — TELEPHONE (OUTPATIENT)
Dept: GASTROENTEROLOGY | Facility: MEDICAL CENTER | Age: 68
End: 2023-09-13

## 2023-09-13 NOTE — TELEPHONE ENCOUNTER
----- Message from Janell Flores RN sent at 9/13/2023 12:13 PM EDT -----  Regarding: Pt needs hospital setting 9/15  Dr. Kendra Jansen reviewed Yunier's CPAP settings, level 18, states he must have procedure in hospital setting.

## 2023-09-13 NOTE — TELEPHONE ENCOUNTER
Spoke to patient and rescheduled to hospital setting. Pt will be going away and will be back in November. Pt rescheduled 11/3/2023 with Dr. Faye Sierra for colonoscopy @ [facility].

## 2023-09-27 ENCOUNTER — TELEPHONE (OUTPATIENT)
Dept: SLEEP CENTER | Facility: CLINIC | Age: 68
End: 2023-09-27

## 2023-09-29 DIAGNOSIS — N40.1 BPH WITH URINARY OBSTRUCTION: ICD-10-CM

## 2023-09-29 DIAGNOSIS — N13.8 BPH WITH URINARY OBSTRUCTION: ICD-10-CM

## 2023-09-29 RX ORDER — TAMSULOSIN HYDROCHLORIDE 0.4 MG/1
CAPSULE ORAL
Qty: 90 CAPSULE | Refills: 0 | Status: SHIPPED | OUTPATIENT
Start: 2023-09-29

## 2023-10-12 DIAGNOSIS — I10 ESSENTIAL HYPERTENSION: ICD-10-CM

## 2023-10-12 RX ORDER — ATORVASTATIN CALCIUM 40 MG/1
40 TABLET, FILM COATED ORAL DAILY
Qty: 90 TABLET | Refills: 0 | Status: SHIPPED | OUTPATIENT
Start: 2023-10-12

## 2023-10-31 ENCOUNTER — TELEPHONE (OUTPATIENT)
Dept: GASTROENTEROLOGY | Facility: MEDICAL CENTER | Age: 68
End: 2023-10-31

## 2023-10-31 ENCOUNTER — ANESTHESIA EVENT (OUTPATIENT)
Dept: ANESTHESIOLOGY | Facility: HOSPITAL | Age: 68
End: 2023-10-31

## 2023-10-31 ENCOUNTER — ANESTHESIA (OUTPATIENT)
Dept: ANESTHESIOLOGY | Facility: HOSPITAL | Age: 68
End: 2023-10-31

## 2023-10-31 NOTE — TELEPHONE ENCOUNTER
SUZANNE Louie; P Gastroenterology [facility]; P Gastroenterology [facility] Clinical  Please see message below from anesthesia, patient to be rescheduled to hospital setting. Any questions or concerns, please reach out to anesthesia directly. Thank you.           Previous Messages       ----- Message -----  From: Pacheco Allred MD  Sent: 10/31/2023  10:07 AM EDT  To: Asc Reschedule Pool  Subject: Messi Santos end needs rescheduled to hospital          Severe HAROON with CPAP 16-20

## 2023-10-31 NOTE — TELEPHONE ENCOUNTER
As per above message, I called and left detailed message informing of 11/16 procedure cancellation. Informed of need to reschedule to hospital setting and provided callback number. When rescheduling, patient must be scheduled at a hospital setting.

## 2023-11-01 ENCOUNTER — TELEPHONE (OUTPATIENT)
Age: 68
End: 2023-11-01

## 2023-11-01 NOTE — TELEPHONE ENCOUNTER
Scheduled date of colonoscopy (as of today):12/5/2023     Physician performing colonoscopy:Jaiyeola, Brady Herb      Location of colonoscopy:     Clearances: N/A

## 2023-11-02 ENCOUNTER — RA CDI HCC (OUTPATIENT)
Dept: OTHER | Facility: HOSPITAL | Age: 68
End: 2023-11-02

## 2023-11-06 ENCOUNTER — APPOINTMENT (OUTPATIENT)
Dept: LAB | Facility: MEDICAL CENTER | Age: 68
End: 2023-11-06
Payer: MEDICARE

## 2023-11-06 DIAGNOSIS — Z12.5 SCREENING FOR PROSTATE CANCER: ICD-10-CM

## 2023-11-06 DIAGNOSIS — E78.2 MIXED HYPERLIPIDEMIA: ICD-10-CM

## 2023-11-06 LAB
ALBUMIN SERPL BCP-MCNC: 3.8 G/DL (ref 3.5–5)
ALP SERPL-CCNC: 49 U/L (ref 34–104)
ALT SERPL W P-5'-P-CCNC: 29 U/L (ref 7–52)
ANION GAP SERPL CALCULATED.3IONS-SCNC: 7 MMOL/L
AST SERPL W P-5'-P-CCNC: 26 U/L (ref 13–39)
BILIRUB SERPL-MCNC: 0.6 MG/DL (ref 0.2–1)
BUN SERPL-MCNC: 16 MG/DL (ref 5–25)
CALCIUM SERPL-MCNC: 8.9 MG/DL (ref 8.4–10.2)
CHLORIDE SERPL-SCNC: 107 MMOL/L (ref 96–108)
CHOLEST SERPL-MCNC: 115 MG/DL
CO2 SERPL-SCNC: 28 MMOL/L (ref 21–32)
CREAT SERPL-MCNC: 1.22 MG/DL (ref 0.6–1.3)
GFR SERPL CREATININE-BSD FRML MDRD: 60 ML/MIN/1.73SQ M
GLUCOSE P FAST SERPL-MCNC: 98 MG/DL (ref 65–99)
HDLC SERPL-MCNC: 48 MG/DL
LDLC SERPL CALC-MCNC: 56 MG/DL (ref 0–100)
NONHDLC SERPL-MCNC: 67 MG/DL
POTASSIUM SERPL-SCNC: 4.5 MMOL/L (ref 3.5–5.3)
PROT SERPL-MCNC: 6.8 G/DL (ref 6.4–8.4)
PSA SERPL-MCNC: 6 NG/ML (ref 0–4)
SODIUM SERPL-SCNC: 142 MMOL/L (ref 135–147)
TRIGL SERPL-MCNC: 57 MG/DL

## 2023-11-06 PROCEDURE — 36415 COLL VENOUS BLD VENIPUNCTURE: CPT

## 2023-11-06 PROCEDURE — 80061 LIPID PANEL: CPT

## 2023-11-06 PROCEDURE — G0103 PSA SCREENING: HCPCS

## 2023-11-06 PROCEDURE — 80053 COMPREHEN METABOLIC PANEL: CPT

## 2023-11-08 ENCOUNTER — APPOINTMENT (OUTPATIENT)
Dept: LAB | Facility: CLINIC | Age: 68
End: 2023-11-08
Payer: MEDICARE

## 2023-11-08 ENCOUNTER — OFFICE VISIT (OUTPATIENT)
Dept: FAMILY MEDICINE CLINIC | Facility: CLINIC | Age: 68
End: 2023-11-08
Payer: MEDICARE

## 2023-11-08 VITALS
HEIGHT: 67 IN | WEIGHT: 173.8 LBS | BODY MASS INDEX: 27.28 KG/M2 | SYSTOLIC BLOOD PRESSURE: 122 MMHG | DIASTOLIC BLOOD PRESSURE: 78 MMHG

## 2023-11-08 DIAGNOSIS — I10 ESSENTIAL HYPERTENSION: ICD-10-CM

## 2023-11-08 DIAGNOSIS — N39.43 DRIBBLING OF URINE: ICD-10-CM

## 2023-11-08 DIAGNOSIS — Z23 ENCOUNTER FOR IMMUNIZATION: Primary | ICD-10-CM

## 2023-11-08 DIAGNOSIS — R97.20 BPH WITH ELEVATED PSA: ICD-10-CM

## 2023-11-08 DIAGNOSIS — E66.3 OVERWEIGHT: ICD-10-CM

## 2023-11-08 DIAGNOSIS — G47.33 OSA (OBSTRUCTIVE SLEEP APNEA): ICD-10-CM

## 2023-11-08 DIAGNOSIS — N40.0 BPH WITH ELEVATED PSA: ICD-10-CM

## 2023-11-08 DIAGNOSIS — E78.2 MIXED HYPERLIPIDEMIA: ICD-10-CM

## 2023-11-08 DIAGNOSIS — Z86.73 HISTORY OF TRANSIENT ISCHEMIC ATTACK (TIA): ICD-10-CM

## 2023-11-08 PROCEDURE — 36415 COLL VENOUS BLD VENIPUNCTURE: CPT

## 2023-11-08 PROCEDURE — 84154 ASSAY OF PSA FREE: CPT

## 2023-11-08 PROCEDURE — 99214 OFFICE O/P EST MOD 30 MIN: CPT | Performed by: FAMILY MEDICINE

## 2023-11-08 PROCEDURE — 84153 ASSAY OF PSA TOTAL: CPT

## 2023-11-08 PROCEDURE — 88112 CYTOPATH CELL ENHANCE TECH: CPT | Performed by: SPECIALIST

## 2023-11-08 PROCEDURE — 87086 URINE CULTURE/COLONY COUNT: CPT

## 2023-11-08 NOTE — ASSESSMENT & PLAN NOTE
Patient labs are stable Patient to continue high dose statin for prevention of stroke Patient to continue with diet I will see him in 6 months Patient of Dr. Fred Jefferson: Leila Cormier RN requesting one more RN visit next week, then discharge from care. Patient is doing well. Please advise.

## 2023-11-08 NOTE — ASSESSMENT & PLAN NOTE
Continue tamsulosin I will check free and total PSA Patient will see urology I will get urine culture and urine cytology also

## 2023-11-08 NOTE — ASSESSMENT & PLAN NOTE
Continue current care Patient to keep good blood pressure control and stay acitve I will see him in 6 months

## 2023-11-08 NOTE — PATIENT INSTRUCTIONS
Enlarged Prostate (BPH)   AMBULATORY CARE:   An enlarged prostate (BPH)  is a common condition in older adults. BPH develops because the number of prostate cells increases (hyperplasia) or the cells get bigger (hypertrophy). The prostate wraps around the urethra. An enlarged prostate can press on the urethra. This may cause problems with storing urine or emptying your bladder completely. Common signs and symptoms:   Urinating 8 or more times each day    A feeling of not fully emptying your bladder when you urinate    An urgent need to urinate that you could not put off, or urinating again within 2 hours    Being woken from sleep because you needed to urinate    Trouble starting your urine flow, or a need to push or strain to get it to start    Urine that stops and starts several times when you urinate    A weak urine stream, or dribbling after you urinate    Call your doctor or urologist if:   You see blood in your urine. You are not able to urinate. Your bladder feels very full and painful. You have new or worsening symptoms. You have a fever. You have questions or concerns about your condition or care. Treatment  may include any of the following:  Watchful waiting  means you do not receive treatment right away. Your signs and symptoms will be monitored over time to see if they get worse. You may be asked to keep a record and bring it to follow-up visits. This record may include when you urinate, how easy or difficult it was, and any changes in urination. Medicines  may be used to relax the muscles in your prostate and bladder. This may help you urinate more easily. You may also need medicine that helps shrink, or slow the growth of your prostate. A procedure  may be used to place a stent into your urethra to hold it open. A stent is a short, tiny mesh tube. A prostatic urethral lift, or UroLift, may be used to hold the prostate away from the urethra.  This makes the urethra wider so urine can pass through more easily. Surgery  may be used to relieve your symptoms if other treatments do not work. Extra tissue that is causing your symptoms may be destroyed. All or part of your prostate may be removed during another type of surgery. What you can do to manage your symptoms:   Urinate on a regular schedule. This will train your bladder to hold urine longer. A larger amount of urine may make it easier to urinate. Drink less liquid during the day. Do not have liquid for several hours before you go to bed at night. Do not drink large amounts of any liquid at one time. Limit alcohol and caffeine. These can irritate your bladder and make your symptoms worse. Eat less salt. Salt can cause fluid buildup and make it harder to urinate. Examples of salty foods are chips, cured meats, and canned soups. Do not use table salt. Elevate your legs if you have swelling. Elevate (raise) your legs above the level of your heart. This can relieve swelling caused by fluid buildup. You may not have to get up in the night to urinate. Exercise regularly. Exercise can help improve your symptoms. Ask your healthcare provider what a healthy weight for you is. Aim to get at least 30 minutes of exercise on most days of the week. Follow up with your doctor or urologist as directed:  Write down your questions so you remember to ask them during your visits. © Copyright [employer] 2023 Information is for End User's use only and may not be sold, redistributed or otherwise used for commercial purposes. The above information is an  only. It is not intended as medical advice for individual conditions or treatments. Talk to your doctor, nurse or pharmacist before following any medical regimen to see if it is safe and effective for you.

## 2023-11-08 NOTE — PROGRESS NOTES
Chief Complaint   Patient presents with    Hypertension    Hyperlipidemia     Name: Yosvany Sun      : 1955      MRN: [id number]  Encounter Provider: Blayne Garcia DO  Encounter Date: 2023   Encounter department: [facility] PRIMARY CARE    Assessment & Plan     1. Encounter for immunization    2. Essential hypertension  Assessment & Plan:  Blood pressure is controlled on meds continue meds and see me in 6 months    Orders:  -     Comprehensive metabolic panel; Future; Expected date: 2024  -     CBC and differential; Future; Expected date: 2024    3. History of transient ischemic attack (TIA)  Assessment & Plan:  Continue current care Patient to keep good blood pressure control and stay acitve I will see him in 6 months     Orders:  -     CBC and differential; Future; Expected date: 2024    4. Mixed hyperlipidemia  Assessment & Plan:  Patient labs are stable Patient to continue high dose statin for prevention of stroke Patient to continue with diet I will see him in 6 months     Orders:  -     Comprehensive metabolic panel; Future; Expected date: 2024  -     Lipid panel; Future; Expected date: 2024    5. HAROON (obstructive sleep apnea)  Assessment & Plan:  Continue CPAP      6. Overweight  Assessment & Plan:  Weight is stable continue with diet and exercise I will see him in 6 months       7. BPH with elevated PSA  Assessment & Plan:  Continue tamsulosin I will check free and total PSA Patient will see urology I will get urine culture and urine cytology also     Orders:  -     Ambulatory Referral to Urology; Future  -     Urine culture; Future  -     Cytology, urine; Future  -     PSA, total and free; Future    8. Dribbling of urine  Assessment & Plan:  Refer urology    Orders:  -     Ambulatory Referral to Urology; Future        Depression Screening and Follow-up Plan: Patient was screened for depression during today's encounter.  They screened negative with a PHQ-2 score of 0. Subjective      Patient is here with his wife for follow up of sleep apnea hypertension history of TIA hyperlipidemia his weight and his BPH Patient has noted 2 episodes of some "pink urine" in the last 2 weeks Patient notes his nocturia is unchanged at 1-2 times per night However he has some dribbling and also some hesitancy when trying to urinate at times Patient PSA is now elevated at 6 He is on tamsulosin He has not seen his urologist since 2021 Patient blood pressure is stable Patient memory is stable post TIA 2 yrs ago Patient last lipids were at goal Patient weight is stable Patient is using CPAP with no issues Patient sees cardiology yearly has the loop recorder in place      Review of Systems   Constitutional:  Negative for fatigue, fever and unexpected weight change. HENT:  Negative for congestion, sinus pain and trouble swallowing. Eyes:  Negative for discharge and visual disturbance. Respiratory:  Negative for cough, chest tightness, shortness of breath and wheezing. Cardiovascular:  Negative for chest pain, palpitations and leg swelling. Gastrointestinal:  Negative for abdominal pain, blood in stool, constipation, diarrhea, nausea and vomiting. Genitourinary:  Negative for difficulty urinating, dysuria, frequency and hematuria. Musculoskeletal:  Negative for arthralgias, gait problem and joint swelling. Skin:  Negative for rash and wound. Allergic/Immunologic: Negative for environmental allergies and food allergies. Neurological:  Negative for dizziness, syncope, weakness, numbness and headaches. Hematological:  Negative for adenopathy. Does not bruise/bleed easily. Psychiatric/Behavioral:  Negative for confusion, decreased concentration and sleep disturbance. The patient is not nervous/anxious.         Current Outpatient Medications on File Prior to Visit   Medication Sig    atorvastatin (LIPITOR) 40 mg tablet TAKE 1 TABLET BY MOUTH ONCE DAILY lisinopril (ZESTRIL) 5 mg tablet TAKE ONE TABLET BY MOUTH ONCE DAILY    multivitamin (THERAGRAN) TABS Take 1 tablet by mouth daily    polyethylene glycol (GOLYTELY) 4000 mL solution Take as directed by office. tamsulosin (FLOMAX) 0.4 mg TAKE ONE CAPSULE BY MOUTH ONCE DAILY with dinner       Objective     /78   Ht 5' 7" (1.702 m)   Wt 78.8 kg (173 lb 12.8 oz)   BMI 27.22 kg/m²     Physical Exam  Vitals and nursing note reviewed. Constitutional:       Appearance: Normal appearance. He is well-developed. HENT:      Head: Normocephalic and atraumatic. Right Ear: Hearing, tympanic membrane and external ear normal.      Left Ear: Hearing, tympanic membrane and external ear normal.   Eyes:      Extraocular Movements: Extraocular movements intact. Conjunctiva/sclera: Conjunctivae normal.      Pupils: Pupils are equal, round, and reactive to light. Neck:      Thyroid: No thyromegaly. Cardiovascular:      Rate and Rhythm: Normal rate and regular rhythm. Heart sounds: Normal heart sounds. Pulmonary:      Effort: Pulmonary effort is normal.      Breath sounds: Normal breath sounds. No wheezing or rales. Abdominal:      General: Bowel sounds are normal. There is no distension. Palpations: Abdomen is soft. Tenderness: There is no abdominal tenderness. Musculoskeletal:         General: No tenderness. Cervical back: Neck supple. Lymphadenopathy:      Cervical: No cervical adenopathy. Skin:     General: Skin is warm and dry. Findings: No rash. Neurological:      General: No focal deficit present. Mental Status: He is alert and oriented to person, place, and time. Cranial Nerves: No cranial nerve deficit. Coordination: Coordination normal.   Psychiatric:         Mood and Affect: Mood normal.         Behavior: Behavior normal.         Thought Content:  Thought content normal.         Judgment: Judgment normal.       Frida Revering, DO

## 2023-11-09 LAB
BACTERIA UR CULT: NORMAL
PSA FREE MFR SERPL: 26 %
PSA FREE SERPL-MCNC: 1.35 NG/ML
PSA SERPL-MCNC: 5.2 NG/ML (ref 0–4)

## 2023-11-10 PROCEDURE — 88112 CYTOPATH CELL ENHANCE TECH: CPT | Performed by: SPECIALIST

## 2023-11-13 ENCOUNTER — TELEPHONE (OUTPATIENT)
Dept: FAMILY MEDICINE CLINIC | Facility: CLINIC | Age: 68
End: 2023-11-13

## 2023-11-13 NOTE — TELEPHONE ENCOUNTER
----- Message from Quentin Romberg, DO sent at 11/13/2023  9:03 AM EST -----  Call patient urine culture and cytology was OK His free PSA shows that his risk of prostate cancer is is low at 9 % He still needs to see the urologist and should keep that appt

## 2023-11-27 ENCOUNTER — TELEPHONE (OUTPATIENT)
Dept: GASTROENTEROLOGY | Facility: MEDICAL CENTER | Age: 68
End: 2023-11-27

## 2023-11-30 ENCOUNTER — OFFICE VISIT (OUTPATIENT)
Dept: UROLOGY | Facility: MEDICAL CENTER | Age: 68
End: 2023-11-30
Payer: MEDICARE

## 2023-11-30 VITALS
HEART RATE: 60 BPM | HEIGHT: 67 IN | SYSTOLIC BLOOD PRESSURE: 106 MMHG | DIASTOLIC BLOOD PRESSURE: 60 MMHG | BODY MASS INDEX: 27.47 KG/M2 | WEIGHT: 175 LBS | OXYGEN SATURATION: 96 %

## 2023-11-30 DIAGNOSIS — N40.1 BPH WITH URINARY OBSTRUCTION: Primary | ICD-10-CM

## 2023-11-30 DIAGNOSIS — R31.0 GROSS HEMATURIA: ICD-10-CM

## 2023-11-30 DIAGNOSIS — R97.20 ELEVATED PROSTATE SPECIFIC ANTIGEN (PSA): ICD-10-CM

## 2023-11-30 DIAGNOSIS — N13.8 BPH WITH URINARY OBSTRUCTION: Primary | ICD-10-CM

## 2023-11-30 DIAGNOSIS — I10 ESSENTIAL HYPERTENSION: ICD-10-CM

## 2023-11-30 LAB
SL AMB  POCT GLUCOSE, UA: ABNORMAL
SL AMB LEUKOCYTE ESTERASE,UA: ABNORMAL
SL AMB POCT BILIRUBIN,UA: ABNORMAL
SL AMB POCT BLOOD,UA: ABNORMAL
SL AMB POCT CLARITY,UA: ABNORMAL
SL AMB POCT COLOR,UA: YELLOW
SL AMB POCT KETONES,UA: ABNORMAL
SL AMB POCT NITRITE,UA: ABNORMAL
SL AMB POCT PH,UA: 6
SL AMB POCT SPECIFIC GRAVITY,UA: 1.01
SL AMB POCT URINE PROTEIN: ABNORMAL
SL AMB POCT UROBILINOGEN: 0.2

## 2023-11-30 PROCEDURE — 81003 URINALYSIS AUTO W/O SCOPE: CPT | Performed by: UROLOGY

## 2023-11-30 PROCEDURE — 99214 OFFICE O/P EST MOD 30 MIN: CPT | Performed by: UROLOGY

## 2023-11-30 RX ORDER — LISINOPRIL 5 MG/1
5 TABLET ORAL DAILY
Qty: 90 TABLET | Refills: 0 | Status: SHIPPED | OUTPATIENT
Start: 2023-11-30

## 2023-11-30 NOTE — PROGRESS NOTES
HISTORY:    1. Recurrent gross hematuria. Urine occasionally maroon to bright red. No real burning or irritative symptoms. Cystoscopy in 2021 showed enlarged prostate, renal ultrasound negative at that time        2. Evaluation for rising PSA       6.0 in November 2023  Repeat 2 days later in November, it was 5.2  3.8 in 2022    2. Seen in August 2021 for gross hematuria    Cystoscopy showed large prostate, no other potential causes of bleeding. ASSESSMENT / PLAN:    1. Adrian in PSA this year, it is trending down again, will recheck in 3 to 6 months    2. Reevaluate gross hematuria with CT and cystoscopy    3. Was started on tamsulosin just recently, does not know if it helping just yet    The following portions of the patient's history were reviewed and updated as appropriate: allergies, current medications, past family history, past medical history, past social history, past surgical history, and problem list.    Review of Systems      Objective:     Physical Exam  Genitourinary:     Comments: Penis testes normal    Prostate minimally large no nodule          0   Lab Value Date/Time    PSA 5.2 (H) 11/08/2023 1436    PSA 6.00 (H) 11/06/2023 1010    PSA 3.81 06/29/2022 0934    PSA 3.6 12/24/2020 0915   ]  BUN   Date Value Ref Range Status   11/06/2023 16 5 - 25 mg/dL Final   12/30/2022 19 7 - 25 mg/dL Final     Creatinine   Date Value Ref Range Status   11/06/2023 1.22 0.60 - 1.30 mg/dL Final     Comment:     Standardized to IDMS reference method   04/02/2016 1.21 0.70 - 1.33 mg/dL Final     Comment:     Result Comment: For patients >52years of age, the reference limit  for Creatinine is approximately 13% higher for people  identified as -American.        No components found for: "CBC"      Patient Active Problem List   Diagnosis    Essential hypertension    Screening for prostate cancer    Overweight    Annual physical exam    BPH with elevated PSA    History of transient ischemic attack (TIA)    Mixed hyperlipidemia    Skin tags, multiple acquired    Tubular adenoma of colon    HAROON (obstructive sleep apnea)    Dribbling of urine        Diagnoses and all orders for this visit:    BPH with urinary obstruction  -     POCT urine dip auto non-scope    Gross hematuria  -     CT abdomen pelvis w wo contrast; Future  -     POCT urine dip auto non-scope  -     Urinalysis with microscopic    Elevated prostate specific antigen (PSA)  -     POCT urine dip auto non-scope  -     PSA Total, Diagnostic; Future    Other orders  -     aspirin (ECOTRIN LOW STRENGTH) 81 mg EC tablet; Take 81 mg by mouth daily           Patient ID: Ann Camacho is a 80 y.o. male. Current Outpatient Medications:     atorvastatin (LIPITOR) 40 mg tablet, TAKE 1 TABLET BY MOUTH ONCE DAILY, Disp: 90 tablet, Rfl: 0    lisinopril (ZESTRIL) 5 mg tablet, TAKE ONE TABLET BY MOUTH ONCE DAILY, Disp: 90 tablet, Rfl: 0    multivitamin (THERAGRAN) TABS, Take 1 tablet by mouth daily, Disp: , Rfl:     polyethylene glycol (GOLYTELY) 4000 mL solution, Take as directed by office. , Disp: 4000 mL, Rfl: 0    tamsulosin (FLOMAX) 0.4 mg, TAKE ONE CAPSULE BY MOUTH ONCE DAILY with dinner, Disp: 90 capsule, Rfl: 0    Past Medical History:   Diagnosis Date    Hyperglycemia     Resolved 3/31/2016     Hypertension        Past Surgical History:   Procedure Laterality Date    CARDIAC LOOP RECORDER      CARDIAC SURGERY      COLONOSCOPY      Proctitis, otherwise nml. Dr. Michelle Lynne.  Resolved 12/3/2008        Social History

## 2023-12-04 RX ORDER — SODIUM CHLORIDE, SODIUM LACTATE, POTASSIUM CHLORIDE, CALCIUM CHLORIDE 600; 310; 30; 20 MG/100ML; MG/100ML; MG/100ML; MG/100ML
125 INJECTION, SOLUTION INTRAVENOUS CONTINUOUS
Status: CANCELLED | OUTPATIENT
Start: 2023-12-04

## 2023-12-05 ENCOUNTER — ANESTHESIA EVENT (OUTPATIENT)
Dept: GASTROENTEROLOGY | Facility: HOSPITAL | Age: 68
End: 2023-12-05

## 2023-12-05 ENCOUNTER — HOSPITAL ENCOUNTER (OUTPATIENT)
Dept: GASTROENTEROLOGY | Facility: HOSPITAL | Age: 68
Setting detail: OUTPATIENT SURGERY
Discharge: HOME | End: 2023-12-05
Attending: INTERNAL MEDICINE
Payer: MEDICARE

## 2023-12-05 ENCOUNTER — ANESTHESIA (OUTPATIENT)
Dept: GASTROENTEROLOGY | Facility: HOSPITAL | Age: 68
End: 2023-12-05

## 2023-12-05 VITALS
DIASTOLIC BLOOD PRESSURE: 63 MMHG | WEIGHT: 175 LBS | SYSTOLIC BLOOD PRESSURE: 126 MMHG | TEMPERATURE: 98.1 F | RESPIRATION RATE: 18 BRPM | OXYGEN SATURATION: 99 % | HEIGHT: 67 IN | HEART RATE: 52 BPM | BODY MASS INDEX: 27.47 KG/M2

## 2023-12-05 DIAGNOSIS — Z86.010 HISTORY OF COLON POLYPS: ICD-10-CM

## 2023-12-05 PROCEDURE — 45385 COLONOSCOPY W/LESION REMOVAL: CPT | Performed by: INTERNAL MEDICINE

## 2023-12-05 PROCEDURE — 88305 TISSUE EXAM BY PATHOLOGIST: CPT | Performed by: SPECIALIST

## 2023-12-05 RX ORDER — SODIUM CHLORIDE, SODIUM LACTATE, POTASSIUM CHLORIDE, CALCIUM CHLORIDE 600; 310; 30; 20 MG/100ML; MG/100ML; MG/100ML; MG/100ML
125 INJECTION, SOLUTION INTRAVENOUS CONTINUOUS
Status: DISCONTINUED | OUTPATIENT
Start: 2023-12-05 | End: 2023-12-09 | Stop reason: HOSPADM

## 2023-12-05 RX ORDER — PROPOFOL 10 MG/ML
INJECTION, EMULSION INTRAVENOUS AS NEEDED
Status: DISCONTINUED | OUTPATIENT
Start: 2023-12-05 | End: 2023-12-05

## 2023-12-05 RX ORDER — LIDOCAINE HYDROCHLORIDE 10 MG/ML
INJECTION, SOLUTION EPIDURAL; INFILTRATION; INTRACAUDAL; PERINEURAL AS NEEDED
Status: DISCONTINUED | OUTPATIENT
Start: 2023-12-05 | End: 2023-12-05

## 2023-12-05 RX ADMIN — SODIUM CHLORIDE, SODIUM LACTATE, POTASSIUM CHLORIDE, AND CALCIUM CHLORIDE 125 ML/HR: .6; .31; .03; .02 INJECTION, SOLUTION INTRAVENOUS at 08:33

## 2023-12-05 RX ADMIN — PROPOFOL 120 MG: 10 INJECTION, EMULSION INTRAVENOUS at 08:44

## 2023-12-05 RX ADMIN — PROPOFOL 140 MCG/KG/MIN: 10 INJECTION, EMULSION INTRAVENOUS at 08:45

## 2023-12-05 RX ADMIN — LIDOCAINE HYDROCHLORIDE 50 MG: 10 INJECTION, SOLUTION EPIDURAL; INFILTRATION; INTRACAUDAL; PERINEURAL at 08:44

## 2023-12-05 NOTE — ANESTHESIA POSTPROCEDURE EVALUATION
Post-Op Assessment Note    CV Status:  Stable  Pain Score: 0    Pain management: adequate       Mental Status:  Alert and awake   Hydration Status:  Euvolemic   PONV Controlled:  Controlled   Airway Patency:  Patent     Post Op Vitals Reviewed: Yes    No anethesia notable event occurred.     Staff: CRNA               BP (!) 88/51 (12/05/23 0910)    Temp      Pulse (!) 52 (12/05/23 0910)   Resp      SpO2 98 % (12/05/23 0910)

## 2023-12-05 NOTE — H&P
H&P EXAM - Outpatient Endoscopy   Sole Pichardo 80 y.o. male MRN: [id number]    [address]   Encounter: [id number]        History and Physical -  [facility]  Sole Pichardo 80 y.o. male MRN: [id number]                  HPI: Kimberley Dumas is a 80y.o. year old male who presents for history of colon polyps      REVIEW OF SYSTEMS: Per the HPI, and otherwise unremarkable. Historical Information   Past Medical History:   Diagnosis Date    Hyperglycemia     Resolved 3/31/2016     Hypertension      Past Surgical History:   Procedure Laterality Date    CARDIAC LOOP RECORDER      CARDIAC SURGERY      COLONOSCOPY      Proctitis, otherwise nml. Dr. Kori Cotton. Resolved 12/3/2008      Social History   Social History     Substance and Sexual Activity   Alcohol Use Yes    Comment: Social      Social History     Substance and Sexual Activity   Drug Use Never     Social History     Tobacco Use   Smoking Status Never   Smokeless Tobacco Never     Family History   Problem Relation Age of Onset    Dementia Mother     Stroke Father     Hypertension Father     No Known Problems Sister     No Known Problems Daughter     No Known Problems Daughter     No Known Problems Sister     No Known Problems Sister     No Known Problems Sister        Meds/Allergies     (Not in a hospital admission)      No Known Allergies    Objective     There were no vitals taken for this visit. PHYSICAL EXAM    Gen: NAD  CV: RRR  CHEST: Clear  ABD: soft, NT/ND  EXT: no edema      ASSESSMENT/PLAN:  This is a 80y.o. year old male here for colonoscopy, and he is stable and optimized for his procedure.

## 2023-12-05 NOTE — ANESTHESIA PREPROCEDURE EVALUATION
Procedure:  COLONOSCOPY    Relevant Problems   CARDIO   (+) Essential hypertension   (+) Mixed hyperlipidemia      /RENAL   (+) BPH with urinary obstruction      PULMONARY   (+) HAROON (obstructive sleep apnea)        Physical Exam    Airway    Mallampati score: II         Dental       Cardiovascular  Cardiovascular exam normal    Pulmonary  Pulmonary exam normal     Other Findings      Anesthesia Plan  ASA Score- 2     Anesthesia Type- IV sedation with anesthesia with ASA Monitors. Additional Monitors:     Airway Plan:            Plan Factors-Exercise tolerance (METS): >4 METS. Chart reviewed. EKG reviewed. Imaging results reviewed. Existing labs reviewed. Patient summary reviewed. Patient is not a current smoker. Patient not instructed to abstain from smoking on day of procedure. Induction- intravenous. Postoperative Plan-     Informed Consent- Anesthetic plan and risks discussed with patient.

## 2023-12-12 PROCEDURE — 88305 TISSUE EXAM BY PATHOLOGIST: CPT | Performed by: SPECIALIST

## 2023-12-27 ENCOUNTER — TELEPHONE (OUTPATIENT)
Age: 68
End: 2023-12-27

## 2023-12-27 ENCOUNTER — NURSE TRIAGE (OUTPATIENT)
Age: 68
End: 2023-12-27

## 2023-12-27 NOTE — TELEPHONE ENCOUNTER
Reason for Disposition  • Message left on unidentified voice mail. Phone number verified.    Protocols used: No Contact or Duplicate Contact Call-ADULT-[place]

## 2023-12-27 NOTE — TELEPHONE ENCOUNTER
Regarding: colon results  ----- Message from Tamika Lynn sent at 12/27/2023  2:33 PM EST -----  Pt calling for colon results. Please reach out to pt

## 2023-12-27 NOTE — TELEPHONE ENCOUNTER
Patients GI provider:  Dr. Villela    Number to return call: [phone]    Reason for call: Pt returning call to discuss results. Transferred call to nurse line and Erica took the call.     Scheduled procedure/appointment date if applicable: Apt/procedure N/A

## 2023-12-29 DIAGNOSIS — N40.1 BPH WITH URINARY OBSTRUCTION: ICD-10-CM

## 2023-12-29 DIAGNOSIS — N13.8 BPH WITH URINARY OBSTRUCTION: ICD-10-CM

## 2023-12-29 RX ORDER — TAMSULOSIN HYDROCHLORIDE 0.4 MG/1
CAPSULE ORAL
Qty: 90 CAPSULE | Refills: 0 | Status: SHIPPED | OUTPATIENT
Start: 2023-12-29

## 2023-12-29 RX ORDER — TAMSULOSIN HYDROCHLORIDE 0.4 MG/1
CAPSULE ORAL
Qty: 90 CAPSULE | Refills: 0 | OUTPATIENT
Start: 2023-12-29

## 2023-12-30 ENCOUNTER — HOSPITAL ENCOUNTER (OUTPATIENT)
Dept: CT IMAGING | Facility: HOSPITAL | Age: 68
Discharge: HOME | End: 2023-12-30
Attending: UROLOGY
Payer: MEDICARE

## 2023-12-30 DIAGNOSIS — R31.0 GROSS HEMATURIA: ICD-10-CM

## 2023-12-30 PROCEDURE — G1004 CDSM NDSC: HCPCS

## 2023-12-30 PROCEDURE — 74178 CT ABD&PLV WO CNTR FLWD CNTR: CPT

## 2023-12-30 RX ADMIN — IOHEXOL 100 ML: 350 INJECTION, SOLUTION INTRAVENOUS at 08:17

## 2024-01-02 ENCOUNTER — TELEPHONE (OUTPATIENT)
Age: 69
End: 2024-01-02

## 2024-01-02 NOTE — TELEPHONE ENCOUNTER
Radiology calling with report update.    Pt under care of: MARY BETH Williamson    Imaging completed: CT Abdomen/Pelvis on 12/30/23    Significant findings. Please, review.

## 2024-01-02 NOTE — TELEPHONE ENCOUNTER
Voice message left for patient that his ct scan was neg and to keep his next scheduled follow up appt.

## 2024-01-03 ENCOUNTER — TELEPHONE (OUTPATIENT)
Dept: UROLOGY | Facility: MEDICAL CENTER | Age: 69
End: 2024-01-03

## 2024-01-03 NOTE — TELEPHONE ENCOUNTER
Spoke with patient in more detail about his ct scan results. I reminded him up of his upcoming appt on 3/13/24 at 9:15am for a cystoscopy and went thru with him the details of this procedure. Patient relayed his understanding.

## 2024-01-17 ENCOUNTER — TELEPHONE (OUTPATIENT)
Dept: UROLOGY | Facility: MEDICAL CENTER | Age: 69
End: 2024-01-17

## 2024-01-17 DIAGNOSIS — K86.2 CYST OF PANCREAS: Primary | ICD-10-CM

## 2024-01-17 NOTE — TELEPHONE ENCOUNTER
----- Message from Paulo Williamson MD sent at 1/17/2024  2:27 PM EST -----  Tell pt:  CT shows kidneys are fine, large prostate that we have known about.  This CT shows a 3.7 cm cyst on the pancreas, likely benign, with recommended x-ray follow-up.  Referral made to surgical oncology, dr. Jostin Yadav - he has interest in evaluating pancreas,

## 2024-01-17 NOTE — TELEPHONE ENCOUNTER
Call placed to Pt and left message on his Voicemail with results and recommendations from Dr Williamson to see the specialist for Pancreas.

## 2024-01-18 ENCOUNTER — TELEPHONE (OUTPATIENT)
Dept: HEMATOLOGY ONCOLOGY | Facility: CLINIC | Age: 69
End: 2024-01-18

## 2024-01-18 NOTE — TELEPHONE ENCOUNTER
I called [facility] in response to a referral that was received for patient to establish care with Surgical Oncology.     Outreach was made to schedule a consultation.    I left a voicemail explaining the reason for my call and advised patient to call [facility] at [phone].  Another attempt will be made to contact patient.

## 2024-01-19 ENCOUNTER — TELEPHONE (OUTPATIENT)
Dept: HEMATOLOGY ONCOLOGY | Facility: CLINIC | Age: 69
End: 2024-01-19

## 2024-01-19 NOTE — TELEPHONE ENCOUNTER
I called [facility] in response to a referral that was received for patient to establish care with Surgical Oncology.      Outreach was made to schedule a consultation.     I left a voicemail explaining the reason for my call and advised patient to call [facility] at [phone].  Another attempt will be made to contact patient.

## 2024-01-22 ENCOUNTER — TELEPHONE (OUTPATIENT)
Dept: HEMATOLOGY ONCOLOGY | Facility: CLINIC | Age: 69
End: 2024-01-22

## 2024-01-22 NOTE — TELEPHONE ENCOUNTER
I called [facility] in response to a referral that was received for patient to establish care with Surgical Oncology.     Outreach was made to schedule a consultation.    I left a voicemail explaining the reason for my call and advised patient to call [facility] at [phone].  This is the third attempt to schedule patient unsuccessfully. The referral has been closed, a [employer] message has been sent to patient (if applicable) and a letter has been sent to the address on file.

## 2024-01-23 ENCOUNTER — APPOINTMENT (OUTPATIENT)
Dept: LAB | Facility: CLINIC | Age: 69
End: 2024-01-23
Payer: MEDICARE

## 2024-01-23 DIAGNOSIS — R97.20 ELEVATED PROSTATE SPECIFIC ANTIGEN (PSA): ICD-10-CM

## 2024-01-23 LAB
BACTERIA UR QL AUTO: ABNORMAL /HPF
BILIRUB UR QL STRIP: NEGATIVE
CLARITY UR: CLEAR
COLOR UR: ABNORMAL
GLUCOSE UR STRIP-MCNC: NEGATIVE MG/DL
HGB UR QL STRIP.AUTO: NEGATIVE
KETONES UR STRIP-MCNC: NEGATIVE MG/DL
LEUKOCYTE ESTERASE UR QL STRIP: NEGATIVE
NITRITE UR QL STRIP: NEGATIVE
NON-SQ EPI CELLS URNS QL MICRO: ABNORMAL /HPF
PH UR STRIP.AUTO: 6.5 [PH]
PROT UR STRIP-MCNC: ABNORMAL MG/DL
PSA SERPL-MCNC: 10.73 NG/ML (ref 0–4)
RBC #/AREA URNS AUTO: ABNORMAL /HPF
SP GR UR STRIP.AUTO: 1.02 (ref 1–1.03)
UROBILINOGEN UR STRIP-ACNC: <2 MG/DL
WBC #/AREA URNS AUTO: ABNORMAL /HPF

## 2024-01-23 PROCEDURE — 84153 ASSAY OF PSA TOTAL: CPT

## 2024-01-23 PROCEDURE — 36415 COLL VENOUS BLD VENIPUNCTURE: CPT

## 2024-01-24 ENCOUNTER — TELEPHONE (OUTPATIENT)
Dept: UROLOGY | Facility: MEDICAL CENTER | Age: 69
End: 2024-01-24

## 2024-01-24 ENCOUNTER — TELEPHONE (OUTPATIENT)
Dept: HEMATOLOGY ONCOLOGY | Facility: CLINIC | Age: 69
End: 2024-01-24

## 2024-01-24 DIAGNOSIS — R97.20 ELEVATED PROSTATE SPECIFIC ANTIGEN (PSA): Primary | ICD-10-CM

## 2024-01-24 NOTE — TELEPHONE ENCOUNTER
Called patient - LMOM to call the office back regarding the results of his recent testing and the recommendations of Dr. Williamson.      Patient has an appointment on 01/26/24.  Patient can cancel this appointment and schedule the MRI of the prostate and make a follow up after that.

## 2024-01-24 NOTE — TELEPHONE ENCOUNTER
[facility] calling in response to a referral that was received for patient to establish care with Surgical Oncology.     Outreach was made to schedule a consultation.    A consultation was scheduled for patient during this call. Patient is scheduled on 2/2/24 at 9:45am with Dr Veronica at the [facility]

## 2024-01-24 NOTE — TELEPHONE ENCOUNTER
Patient was calling in regards to a VM he got from the office last week. Per previous encounter, relayed the message. Gave the number for Surgical Oncology for him to schedule his referral.     Also, confirmed the appt for Friday 1/26 at 9:30am

## 2024-01-24 NOTE — TELEPHONE ENCOUNTER
----- Message from Paulo Williamson MD sent at 1/24/2024  2:25 PM EST -----  Please call the patient regarding his abnormal result.  PSA rising further, I recommend MRI.  Order is in.

## 2024-01-25 ENCOUNTER — DOCUMENTATION (OUTPATIENT)
Dept: HEMATOLOGY ONCOLOGY | Facility: CLINIC | Age: 69
End: 2024-01-25

## 2024-01-25 DIAGNOSIS — K86.2 PANCREATIC CYST: Primary | ICD-10-CM

## 2024-01-25 DIAGNOSIS — R68.89 ABNORMAL FINDING: ICD-10-CM

## 2024-01-25 NOTE — TELEPHONE ENCOUNTER
Patient to keep appointment with oncology as scheduled. MRI results ideally should be 2 weeks from MRI. Thanks

## 2024-01-25 NOTE — TELEPHONE ENCOUNTER
Pt wants to know if he should wait to see oncology until after he gets the MRI done.      PT scheduled with oncology on 2/2  MRI is scheduled for 2/5.    Also pt is schedule for Follow up for MRI results on 3/13/24  is that too far out.     Please advise and call pt back at [phone]

## 2024-01-25 NOTE — PROGRESS NOTES
Intake received and chart reviewed for pathway workup evaluation.      Chart rvw'd on 1/25/24    Referring provider-  Dr. Paulo Williamson      Labs-  11/6/23  CMP      Imaging-  12/30/23 - CT ABDOMEN AND PELVIS WITH AND WITHOUT IV CONTRAST     IMPRESSION:     No CT abnormality identified to account for hematuria.     Markedly enlarged prostate.     3.7 cm multilobulated cystic lesion in the pancreatic body. Recommend characterization and establishment of baseline now. Preferred modality is Abdomen MRI with and without IV contrast/MRCP. First alternative is pancreatic protocol abdomen and pelvic CT   with contrast. Second is abdomen MRI without contrast/MRCP.      Scheduled appointments-    2/2/24 - Dr. Veronica

## 2024-01-25 NOTE — PROGRESS NOTES
Intake received/ Chart reviewed for services completed outside of [facility]    Pathology completed: none    Imaging completed: 12/30/2023 CT abd/pelvis    All records needed are in patients chart. No records retrieval needed at this time.    Dr. Veronica 2/2/2024

## 2024-01-25 NOTE — TELEPHONE ENCOUNTER
Patient returning call.     Patient informed of Dr. Williamson's message    ----- Message from Paulo Williamson MD sent at 1/24/2024  2:25 PM EST -----  Please call the patient regarding his abnormal result.  PSA rising further, I recommend MRI.  Order is in.     Patient also informed he does not need tomorrow's appt and it has been canceled. He will call back once he schedules the MRI to schedule follow up with Dr Williamson

## 2024-01-25 NOTE — PROGRESS NOTES
Intake reviewed   1/25/2024  Diagnosis   panc cyst    Referrals placed   surgical oncology  Labs ordered   CBC & CMP ( unable to order per medicare guidelines)  Scan(s) ordered and scheduled   MRI abd w/wo MRCP   Consults scheduled   Dr. Veronica 2/13/2024    Insurance verified Medicare     Appropriate for [facility] initial outreach/Gen Assess   yes

## 2024-01-26 NOTE — TELEPHONE ENCOUNTER
Call placed to patient and spoke with him. Informed pt of the AP recommendations and pt will keep appt as scheduled.

## 2024-01-29 ENCOUNTER — PATIENT OUTREACH (OUTPATIENT)
Dept: HEMATOLOGY ONCOLOGY | Facility: CLINIC | Age: 69
End: 2024-01-29

## 2024-01-29 NOTE — PROGRESS NOTES
TC to patient to discuss MRI for panc cyst. Patient also has another MRI for prostate scheduled a few days before. He asked if this would be covered by medicare. I told him I was unsure but would check with our finance team and get back to him. Email sent to finance advocacy pool

## 2024-01-30 ENCOUNTER — APPOINTMENT (OUTPATIENT)
Dept: LAB | Facility: CLINIC | Age: 69
End: 2024-01-30
Payer: MEDICARE

## 2024-01-30 DIAGNOSIS — K86.2 PANCREATIC CYST: ICD-10-CM

## 2024-01-30 DIAGNOSIS — R68.89 ABNORMAL FINDING: ICD-10-CM

## 2024-01-30 LAB
ALBUMIN SERPL BCP-MCNC: 3.5 G/DL (ref 3.5–5)
ALP SERPL-CCNC: 60 U/L (ref 34–104)
ALT SERPL W P-5'-P-CCNC: 18 U/L (ref 7–52)
ANION GAP SERPL CALCULATED.3IONS-SCNC: 4 MMOL/L
AST SERPL W P-5'-P-CCNC: 16 U/L (ref 13–39)
BASOPHILS # BLD AUTO: 0.02 THOUSANDS/ÂΜL (ref 0–0.1)
BASOPHILS NFR BLD AUTO: 0 % (ref 0–1)
BILIRUB SERPL-MCNC: 0.42 MG/DL (ref 0.2–1)
BUN SERPL-MCNC: 17 MG/DL (ref 5–25)
CALCIUM SERPL-MCNC: 9 MG/DL (ref 8.4–10.2)
CHLORIDE SERPL-SCNC: 107 MMOL/L (ref 96–108)
CO2 SERPL-SCNC: 31 MMOL/L (ref 21–32)
CREAT SERPL-MCNC: 1.21 MG/DL (ref 0.6–1.3)
EOSINOPHIL # BLD AUTO: 0.08 THOUSAND/ÂΜL (ref 0–0.61)
EOSINOPHIL NFR BLD AUTO: 1 % (ref 0–6)
ERYTHROCYTE [DISTWIDTH] IN BLOOD BY AUTOMATED COUNT: 12.4 % (ref 11.6–15.1)
GFR SERPL CREATININE-BSD FRML MDRD: 61 ML/MIN/1.73SQ M
GLUCOSE SERPL-MCNC: 112 MG/DL (ref 65–140)
HCT VFR BLD AUTO: 41.1 % (ref 36.5–49.3)
HGB BLD-MCNC: 13.9 G/DL (ref 12–17)
IMM GRANULOCYTES # BLD AUTO: 0.05 THOUSAND/UL (ref 0–0.2)
IMM GRANULOCYTES NFR BLD AUTO: 1 % (ref 0–2)
LYMPHOCYTES # BLD AUTO: 1.33 THOUSANDS/ÂΜL (ref 0.6–4.47)
LYMPHOCYTES NFR BLD AUTO: 17 % (ref 14–44)
MCH RBC QN AUTO: 31.2 PG (ref 26.8–34.3)
MCHC RBC AUTO-ENTMCNC: 33.8 G/DL (ref 31.4–37.4)
MCV RBC AUTO: 92 FL (ref 82–98)
MONOCYTES # BLD AUTO: 0.71 THOUSAND/ÂΜL (ref 0.17–1.22)
MONOCYTES NFR BLD AUTO: 9 % (ref 4–12)
NEUTROPHILS # BLD AUTO: 5.77 THOUSANDS/ÂΜL (ref 1.85–7.62)
NEUTS SEG NFR BLD AUTO: 72 % (ref 43–75)
NRBC BLD AUTO-RTO: 0 /100 WBCS
PLATELET # BLD AUTO: 224 THOUSANDS/UL (ref 149–390)
PMV BLD AUTO: 9.7 FL (ref 8.9–12.7)
POTASSIUM SERPL-SCNC: 3.9 MMOL/L (ref 3.5–5.3)
PROT SERPL-MCNC: 7 G/DL (ref 6.4–8.4)
RBC # BLD AUTO: 4.45 MILLION/UL (ref 3.88–5.62)
SODIUM SERPL-SCNC: 142 MMOL/L (ref 135–147)
WBC # BLD AUTO: 7.96 THOUSAND/UL (ref 4.31–10.16)

## 2024-01-30 PROCEDURE — 80053 COMPREHEN METABOLIC PANEL: CPT

## 2024-01-30 PROCEDURE — 36415 COLL VENOUS BLD VENIPUNCTURE: CPT

## 2024-01-30 PROCEDURE — 85025 COMPLETE CBC W/AUTO DIFF WBC: CPT

## 2024-01-30 NOTE — TELEPHONE ENCOUNTER
Patient called today asking clarification for his lab instructions.     Pt is not sure when he's to have CMP, CBC labs drawn .    Pt mentioned that when he spoke with someone in our office, he was told that the lab work is not covered by Medicare and that he should wait to have them drawn.     Please review.    Call back [phone]

## 2024-01-30 NOTE — TELEPHONE ENCOUNTER
Spoke to pt and pt's wife and advised bloodwork is ordered prior to an MRI to check kidney function.  He should have it drawn within the next 2 days for MRI scheduled for 2/5/24.  Pt was advised that Medicare should cover the labs but he can contact them by callling number on back of his card to ask about coverage.

## 2024-01-31 ENCOUNTER — PATIENT OUTREACH (OUTPATIENT)
Dept: HEMATOLOGY ONCOLOGY | Facility: CLINIC | Age: 69
End: 2024-01-31

## 2024-01-31 NOTE — PROGRESS NOTES
Hi  I have a question….  A patient is having an MRI for his prostate 2/5 and another MRI for his recent finding of pancreatic cyst. Will there be an issue with medicare?    Email sent to Oncology financial advocacy.    Response  No there shouldn't be any issues.     Thanks,     Bk Momin   Oncology Prevention and Advocacy Specialist     Called Yunier to make him aware of information as above. He thanked me for the call and verbalized understanding

## 2024-02-03 DIAGNOSIS — I10 ESSENTIAL HYPERTENSION: ICD-10-CM

## 2024-02-03 RX ORDER — ATORVASTATIN CALCIUM 40 MG/1
40 TABLET, FILM COATED ORAL DAILY
Qty: 90 TABLET | Refills: 1 | Status: SHIPPED | OUTPATIENT
Start: 2024-02-03

## 2024-02-05 ENCOUNTER — TELEPHONE (OUTPATIENT)
Age: 69
End: 2024-02-05

## 2024-02-05 ENCOUNTER — HOSPITAL ENCOUNTER (OUTPATIENT)
Facility: MEDICAL CENTER | Age: 69
Discharge: HOME | End: 2024-02-05
Attending: UROLOGY
Payer: MEDICARE

## 2024-02-05 DIAGNOSIS — R97.20 ELEVATED PROSTATE SPECIFIC ANTIGEN (PSA): ICD-10-CM

## 2024-02-05 PROCEDURE — G1004 CDSM NDSC: HCPCS

## 2024-02-05 PROCEDURE — 76377 3D RENDER W/INTRP POSTPROCES: CPT

## 2024-02-05 PROCEDURE — A9585 GADOBUTROL INJECTION: HCPCS | Performed by: UROLOGY

## 2024-02-05 PROCEDURE — 72197 MRI PELVIS W/O & W/DYE: CPT

## 2024-02-05 RX ORDER — GADOBUTROL 604.72 MG/ML
8 INJECTION INTRAVENOUS
Status: COMPLETED | OUTPATIENT
Start: 2024-02-05 | End: 2024-02-05

## 2024-02-05 RX ADMIN — GADOBUTROL 8 ML: 604.72 INJECTION INTRAVENOUS at 13:28

## 2024-02-05 NOTE — TELEPHONE ENCOUNTER
Patient called requesting refill for Atorvastatin 40 mg. Patient made aware medication was refilled on 02/03/2024 for 90 with 1 refills to [facility] pharmacy. Patient instructed to contact the pharmacy to obtain refills of medication. Patient verbalized understanding.

## 2024-02-08 ENCOUNTER — HOSPITAL ENCOUNTER (OUTPATIENT)
Dept: RADIOLOGY | Facility: HOSPITAL | Age: 69
Discharge: HOME | End: 2024-02-08
Attending: UROLOGY
Payer: MEDICARE

## 2024-02-08 DIAGNOSIS — R68.89 ABNORMAL FINDING: ICD-10-CM

## 2024-02-08 DIAGNOSIS — K86.2 PANCREATIC CYST: ICD-10-CM

## 2024-02-08 PROBLEM — K86.89 PANCREATIC MASS: Status: ACTIVE | Noted: 2024-02-08

## 2024-02-08 PROCEDURE — A9585 GADOBUTROL INJECTION: HCPCS | Performed by: UROLOGY

## 2024-02-08 PROCEDURE — G1004 CDSM NDSC: HCPCS

## 2024-02-08 PROCEDURE — 74183 MRI ABD W/O CNTR FLWD CNTR: CPT

## 2024-02-08 RX ORDER — GADOBUTROL 604.72 MG/ML
8 INJECTION INTRAVENOUS
Status: COMPLETED | OUTPATIENT
Start: 2024-02-08 | End: 2024-02-08

## 2024-02-08 RX ADMIN — GADOBUTROL 8 ML: 604.72 INJECTION INTRAVENOUS at 07:47

## 2024-02-13 ENCOUNTER — TELEPHONE (OUTPATIENT)
Age: 69
End: 2024-02-13

## 2024-02-13 DIAGNOSIS — K86.2 CYST OF PANCREAS: Primary | ICD-10-CM

## 2024-02-13 NOTE — TELEPHONE ENCOUNTER
Radiology Test Results - Radiology Calling with report update    Pt under care of: Dr. Williamson    Imaging Completed:2/8/24    Significant Findings - Please review

## 2024-02-14 ENCOUNTER — CONSULT (OUTPATIENT)
Dept: SURGICAL ONCOLOGY | Facility: CLINIC | Age: 69
End: 2024-02-14
Payer: MEDICARE

## 2024-02-14 ENCOUNTER — TELEPHONE (OUTPATIENT)
Dept: UROLOGY | Facility: MEDICAL CENTER | Age: 69
End: 2024-02-14

## 2024-02-14 ENCOUNTER — TELEPHONE (OUTPATIENT)
Age: 69
End: 2024-02-14

## 2024-02-14 VITALS
WEIGHT: 180.2 LBS | HEIGHT: 67 IN | RESPIRATION RATE: 18 BRPM | HEART RATE: 63 BPM | BODY MASS INDEX: 28.28 KG/M2 | OXYGEN SATURATION: 99 % | TEMPERATURE: 97.4 F | SYSTOLIC BLOOD PRESSURE: 148 MMHG | DIASTOLIC BLOOD PRESSURE: 60 MMHG

## 2024-02-14 DIAGNOSIS — K86.89 PANCREATIC MASS: Primary | ICD-10-CM

## 2024-02-14 DIAGNOSIS — K86.2 CYST OF PANCREAS: ICD-10-CM

## 2024-02-14 DIAGNOSIS — R97.20 ELEVATED PROSTATE SPECIFIC ANTIGEN (PSA): Primary | ICD-10-CM

## 2024-02-14 PROCEDURE — 99203 OFFICE O/P NEW LOW 30 MIN: CPT | Performed by: SURGERY

## 2024-02-14 NOTE — PROGRESS NOTES
Surgical Oncology Consult       [facility] [place]  [address] [facility] [address]  [phone]    Yunier D Kylee  1955  [id number]  [facility] [place]  [address] [facility] [address]  [phone]    Chief Complaint   Patient presents with    Consult     Patient being seen for consult for 3.7cm panc body cyst. CT 12/30/2023, MRI 2/8/2024.       Assessment/Plan:    No problem-specific Assessment & Plan notes found for this encounter.       Diagnoses and all orders for this visit:    Pancreatic mass  -     Ambulatory Referral to Gastroenterology; Future    Cyst of pancreas  -     Ambulatory Referral to Surgical Oncology      Advance Care Planning/Advance Directives:  Discussed disease status, cancer treatment plans and/or cancer treatment goals with the patient.     Oncology History    No history exists.       History of Present Illness: Patient is a 68-year-old man instantly found to have pancreatic cysts, after undergoing imaging for workup of prostate issues.  He has no prior pancreas history.  No new onset diabetes.  No abdominal pain.  No history of jaundice.    Review of Systems   Constitutional: Negative.    HENT: Negative.     Eyes: Negative.    Respiratory: Negative.     Cardiovascular: Negative.    Gastrointestinal: Negative.    Endocrine: Negative.    Genitourinary: Negative.    Musculoskeletal: Negative.    Skin: Negative.    Allergic/Immunologic: Negative.    Neurological: Negative.    Hematological: Negative.    Psychiatric/Behavioral: Negative.     All other systems reviewed and are negative.        Patient Active Problem List   Diagnosis    Essential hypertension    Screening for prostate cancer    Overweight    Annual physical exam    Elevated prostate specific antigen (PSA)    BPH with urinary obstruction    History of transient ischemic  attack (TIA)    Mixed hyperlipidemia    Skin tags, multiple acquired    Tubular adenoma of colon    HAROON (obstructive sleep apnea)    Dribbling of urine    Pancreatic mass     Past Medical History:   Diagnosis Date    Hyperglycemia     Resolved 3/31/2016     Hypertension      Past Surgical History:   Procedure Laterality Date    CARDIAC LOOP RECORDER      CARDIAC SURGERY      COLONOSCOPY      Proctitis, otherwise nml. Dr. Moore. Resolved 12/3/2008      Family History   Problem Relation Age of Onset    Dementia Mother     Stroke Father     Hypertension Father     No Known Problems Sister     No Known Problems Daughter     No Known Problems Daughter     No Known Problems Sister     No Known Problems Sister     No Known Problems Sister      Social History     Socioeconomic History    Marital status: /Civil Union     Spouse name: Not on file    Number of children: Not on file    Years of education: Not on file    Highest education level: Not on file   Occupational History    Not on file   Tobacco Use    Smoking status: Never    Smokeless tobacco: Never   Vaping Use    Vaping status: Never Used   Substance and Sexual Activity    Alcohol use: Yes     Comment: Social     Drug use: Never    Sexual activity: Yes     Partners: Female     Birth control/protection: Post-menopausal     Comment: 2 kids 33 and 31   Other Topics Concern    Not on file   Social History Narrative    Not on file     Social Determinants of Health     Financial Resource Strain: Low Risk  (3/28/2023)    Overall Financial Resource Strain (CARDIA)     Difficulty of Paying Living Expenses: Not very hard   Food Insecurity: Not on file   Transportation Needs: No Transportation Needs (3/28/2023)    PRAPARE - Transportation     Lack of Transportation (Medical): No     Lack of Transportation (Non-Medical): No   Physical Activity: Not on file   Stress: Not on file   Social Connections: Not on file   Intimate Partner Violence: Not on file   Housing  Stability: Not on file       Current Outpatient Medications:     aspirin (ECOTRIN LOW STRENGTH) 81 mg EC tablet, Take 81 mg by mouth daily, Disp: , Rfl:     atorvastatin (LIPITOR) 40 mg tablet, TAKE ONE TABLET BY MOUTH ONCE DAILY, Disp: 90 tablet, Rfl: 1    lisinopril (ZESTRIL) 5 mg tablet, TAKE 1 TABLET BY MOUTH ONCE DAILY, Disp: 90 tablet, Rfl: 0    multivitamin (THERAGRAN) TABS, Take 1 tablet by mouth daily, Disp: , Rfl:     tamsulosin (FLOMAX) 0.4 mg, TAKE 1 CAPSULE BY MOUTH ONCE DAILY WITH DINNER, Disp: 90 capsule, Rfl: 0  No Known Allergies  Vitals:    02/14/24 1312   BP: 148/60   Pulse: 63   Resp: 18   Temp: (!) 97.4 °F (36.3 °C)   SpO2: 99%       Physical Exam  Vitals reviewed.   Constitutional:       Appearance: Normal appearance.   HENT:      Head: Normocephalic and atraumatic.      Right Ear: External ear normal.      Left Ear: External ear normal.   Eyes:      Extraocular Movements: Extraocular movements intact.      Pupils: Pupils are equal, round, and reactive to light.   Cardiovascular:      Rate and Rhythm: Normal rate and regular rhythm.      Heart sounds: Normal heart sounds.   Pulmonary:      Effort: Pulmonary effort is normal.      Breath sounds: Normal breath sounds.   Abdominal:      General: Abdomen is flat.   Musculoskeletal:         General: Normal range of motion.      Cervical back: Normal range of motion.   Skin:     General: Skin is warm and dry.   Neurological:      General: No focal deficit present.      Mental Status: He is alert and oriented to person, place, and time.   Psychiatric:         Mood and Affect: Mood normal.         Behavior: Behavior normal.         Pathology:  none    Labs:  Lab Results   Component Value Date     04/02/2016    SODIUM 142 01/30/2024    K 3.9 01/30/2024     01/30/2024    CO2 31 01/30/2024    ANIONGAP 4 03/03/2014    AGAP 4 01/30/2024    BUN 17 01/30/2024    CREATININE 1.21 01/30/2024    GLUC 112 01/30/2024    GLUF 98 11/06/2023    CALCIUM 9.0  01/30/2024    AST 16 01/30/2024    ALT 18 01/30/2024    ALKPHOS 60 01/30/2024    PROT 6.9 04/02/2016    TP 7.0 01/30/2024    BILITOT 0.6 04/02/2016    TBILI 0.42 01/30/2024    EGFR 61 01/30/2024         Imaging  MRI abdomen w wo contrast and mrcp    Result Date: 2/13/2024  Narrative: MRI OF THE ABDOMEN WITH AND WITHOUT CONTRAST WITH MRCP INDICATION: 68 years / Male. R68.89: Other general symptoms and signs K86.2: Cyst of pancreas. COMPARISON: No prior MR abdomen available for comparison. Correlation with CT abdomen pelvis December 30, 2023 TECHNIQUE: Multiplanar/multisequence MRI of the abdomen with 3D MRCP was performed before and after administration of contrast. IV Contrast: 8 mL of Gadobutrol injection (SINGLE-DOSE) FINDINGS: LOWER CHEST: Unremarkable. LIVER: Normal in size and configuration. No suspicious mass. Patent hepatic and portal veins. BILE DUCTS: No intrahepatic or extrahepatic bile duct dilation. Common bile duct is normal in caliber. No choledocholithiasis, biliary stricture or suspicious mass. GALLBLADDER: Normal PANCREAS: Two adjacent pancreatic neck cysts measure 2.4 cm (series 11, image 34), and 1.7 cm (series 11, image 33). The cysts communicates with sidebranches of the nondilated main pancreatic duct (series 11, image 37). T2 hypointense, T1 hyperintense tissue measuring 0.5 cm in thickness courses between the cysts, and is intervening pancreatic parenchyma and not a septation given its T1 signal and attenuation on the prior CT  (series 8, image 15; series 13, image 46 and series 3, image 40 on prior CT). There are few thin septations within the both cysts, measuring up to 0.1 cm in thickness (series 7, image 15). There are no solid components of the cysts. ADRENAL GLANDS: 1.1 cm left adrenal cyst (series 9, image 16). No right adrenal lesion. SPLEEN: Normal. KIDNEYS/PROXIMAL URETERS: No hydroureteronephrosis. No suspicious renal mass. Few subcentimeter bilateral renal cysts measure up to 0.8  cm on the right. BOWEL: No dilated loops of bowel. PERITONEUM/RETROPERITONEUM: No ascites. LYMPH NODES: No abdominal lymphadenopathy. VESSELS: No aneurysm. ABDOMINAL WALL: Unremarkable BONES: No suspicious osseous lesion.     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 For simple cyst(s) 2 cm or greater recommend gastroenterology and/or surgical oncology consult. EUS is likely now warranted. rrPreferred imaging modality: abdomen MRI and MRCP with and without IV contrast, or triple phase abdomen CT with IV contrast, or abdomen MRI and MRCP without IV contrast. The study was marked in EPIC for significant notification. Workstation performed: [id number]     MRI prostate multiparametric wo w contrast    Result Date: 2/12/2024  Narrative: MULTIPARAMETRIC MRI OF THE PROSTATE WITH AND WITHOUT CONTRAST-WITH 3-D POSTPROCESSING INDICATION: R97.20: Elevated prostate specific antigen (PSA). COMPARISON: CT abdomen pelvis 12/30/2023. PSA LEVEL: 10.7 ng/mL on 1/23/2024. PRIOR BIOPSY DATE: No prior biopsy. BIOPSY RESULTS: Not applicable. TECHNIQUE: The following pulse sequences were obtained: Small field-of-view axial T1-weighted and multiplanar T2-weighted images; DWI axial and ADC map; large field of view axial T2 weighted images; T1w in-phase and opposed-phase axials of entire pelvis and dynamic 3D T1w axial before and during IV contrast injection. Imaging performed on 3T MRI. CONTRAST: Gadobutrol (Gadavist) 8 mL of Gadobutrol injection (SINGLE-DOSE) TECHNICAL LIMITATIONS: None. FINDINGS: PROSTATE: Size: 6.2 x 5.3 x 5.8 cm = 94.2 cc. Post-biopsy hemorrhage: None. Central gland enlargement (BPH): Marked. Probable midline prostatic utricle cyst measuring 0.5 cm (series 4 image 16). Focal lesions - No dominant lesion. Heterogeneous peripheral zone. PI-RADSv2.1 Category 2 - Low (clinically significant cancer  unlikely). SEMINAL VESICLES: Unremarkable Note: Clinically significant cancer is defined on pathology/histology as Holloway score greater than or equal to 7, and/or volume of greater than or equal to 0.5 mL, and/or extraprostatic extension. URINARY BLADDER: Unremarkable. LYMPH NODES: No pelvic lymphadenopathy. BONES: No suspicious osseous lesion. OTHER: Small fat-containing left inguinal hernia. Colonic diverticulosis.     Impression: 1. PI-RADSv2.1 Category 2 - Low (clinically significant cancer is unlikely to be present). 2. No extraprostatic tumor, seminal vesicle invasion, pelvic lymphadenopathy, or pelvic osseous metastatic disease. 3. Calculated prostate volume of 94.2 cc. Prostate gland boundaries were segmented using 3D advanced post-processing on an independent [employer] system workstation with active physician participation. Workstation performed: [id number]     I reviewed the above laboratory and imaging data.    Discussion/Summary: Pancreatic cysts, mid body of pancreas, undetermined etiology.  Will set up for GI evaluation and EUS and biopsy, followed by visit in clinic to determine next step in treatment.

## 2024-02-14 NOTE — TELEPHONE ENCOUNTER
"----- Message from Paulo Williamson MD sent at 2/13/2024 11:10 AM EST -----  Tell pt: Patient is a PSA spiked.  MRI shows nothing suspicious.  While this does not prove there is no cancer, usually means there is no \"significant\" cancer.  If he is comfortable with this, follow-up in 6 months with another PSA.  Pt is aware of results. Pt has appt on March 13 with Dr Williamson  "

## 2024-02-14 NOTE — LETTER
February 14, 2024     Tanya Harper DO  [address] [facility]  [address]  [facility] [address]    Patient: Yunier Pichardo   YOB: 1955   Date of Visit: 2/14/2024       Dear Dr. Harper:    Thank you for referring Yunier Pichardo to me for evaluation. Below are my notes for this consultation.    If you have questions, please do not hesitate to call me. I look forward to following your patient along with you.         Sincerely,        Franky Veronica MD        CC: MD Lucien Barroso, SUZANNE Veronica MD  2/14/2024  1:55 PM  Sign when Signing Visit               Surgical Oncology Consult       [facility] [address] [address]  [phone]    Yunier Pichardo  1955  [id number]  [facility] [place] [id number] [facility] [address]  [phone]    Chief Complaint   Patient presents with   • Consult     Patient being seen for consult for 3.7cm panc body cyst. CT 12/30/2023, MRI 2/8/2024.       Assessment/Plan:    No problem-specific Assessment & Plan notes found for this encounter.       Diagnoses and all orders for this visit:    Pancreatic mass  -     Ambulatory Referral to Gastroenterology; Future    Cyst of pancreas  -     Ambulatory Referral to Surgical Oncology      Advance Care Planning/Advance Directives:  Discussed disease status, cancer treatment plans and/or cancer treatment goals with the patient.     Oncology History    No history exists.       History of Present Illness: Patient is a 68-year-old man instantly found to have pancreatic cysts, after undergoing imaging for workup of prostate issues.  He has no prior pancreas history.  No new onset diabetes.  No abdominal pain.  No history of jaundice.    Review of Systems   Constitutional: Negative.    HENT: Negative.     Eyes: Negative.    Respiratory: Negative.      Cardiovascular: Negative.    Gastrointestinal: Negative.    Endocrine: Negative.    Genitourinary: Negative.    Musculoskeletal: Negative.    Skin: Negative.    Allergic/Immunologic: Negative.    Neurological: Negative.    Hematological: Negative.    Psychiatric/Behavioral: Negative.     All other systems reviewed and are negative.        Patient Active Problem List   Diagnosis   • Essential hypertension   • Screening for prostate cancer   • Overweight   • Annual physical exam   • Elevated prostate specific antigen (PSA)   • BPH with urinary obstruction   • History of transient ischemic attack (TIA)   • Mixed hyperlipidemia   • Skin tags, multiple acquired   • Tubular adenoma of colon   • HAROON (obstructive sleep apnea)   • Dribbling of urine   • Pancreatic mass     Past Medical History:   Diagnosis Date   • Hyperglycemia     Resolved 3/31/2016    • Hypertension      Past Surgical History:   Procedure Laterality Date   • CARDIAC LOOP RECORDER     • CARDIAC SURGERY     • COLONOSCOPY      Proctitis, otherwise nml. Dr. Moore. Resolved 12/3/2008      Family History   Problem Relation Age of Onset   • Dementia Mother    • Stroke Father    • Hypertension Father    • No Known Problems Sister    • No Known Problems Daughter    • No Known Problems Daughter    • No Known Problems Sister    • No Known Problems Sister    • No Known Problems Sister      Social History     Socioeconomic History   • Marital status: /Civil Union     Spouse name: Not on file   • Number of children: Not on file   • Years of education: Not on file   • Highest education level: Not on file   Occupational History   • Not on file   Tobacco Use   • Smoking status: Never   • Smokeless tobacco: Never   Vaping Use   • Vaping status: Never Used   Substance and Sexual Activity   • Alcohol use: Yes     Comment: Social    • Drug use: Never   • Sexual activity: Yes     Partners: Female     Birth control/protection: Post-menopausal     Comment: 2 kids 33  and 31   Other Topics Concern   • Not on file   Social History Narrative   • Not on file     Social Determinants of Health     Financial Resource Strain: Low Risk  (3/28/2023)    Overall Financial Resource Strain (CARDIA)    • Difficulty of Paying Living Expenses: Not very hard   Food Insecurity: Not on file   Transportation Needs: No Transportation Needs (3/28/2023)    PRAPARE - Transportation    • Lack of Transportation (Medical): No    • Lack of Transportation (Non-Medical): No   Physical Activity: Not on file   Stress: Not on file   Social Connections: Not on file   Intimate Partner Violence: Not on file   Housing Stability: Not on file       Current Outpatient Medications:   •  aspirin (ECOTRIN LOW STRENGTH) 81 mg EC tablet, Take 81 mg by mouth daily, Disp: , Rfl:   •  atorvastatin (LIPITOR) 40 mg tablet, TAKE ONE TABLET BY MOUTH ONCE DAILY, Disp: 90 tablet, Rfl: 1  •  lisinopril (ZESTRIL) 5 mg tablet, TAKE 1 TABLET BY MOUTH ONCE DAILY, Disp: 90 tablet, Rfl: 0  •  multivitamin (THERAGRAN) TABS, Take 1 tablet by mouth daily, Disp: , Rfl:   •  tamsulosin (FLOMAX) 0.4 mg, TAKE 1 CAPSULE BY MOUTH ONCE DAILY WITH DINNER, Disp: 90 capsule, Rfl: 0  No Known Allergies  Vitals:    02/14/24 1312   BP: 148/60   Pulse: 63   Resp: 18   Temp: (!) 97.4 °F (36.3 °C)   SpO2: 99%       Physical Exam  Vitals reviewed.   Constitutional:       Appearance: Normal appearance.   HENT:      Head: Normocephalic and atraumatic.      Right Ear: External ear normal.      Left Ear: External ear normal.   Eyes:      Extraocular Movements: Extraocular movements intact.      Pupils: Pupils are equal, round, and reactive to light.   Cardiovascular:      Rate and Rhythm: Normal rate and regular rhythm.      Heart sounds: Normal heart sounds.   Pulmonary:      Effort: Pulmonary effort is normal.      Breath sounds: Normal breath sounds.   Abdominal:      General: Abdomen is flat.   Musculoskeletal:         General: Normal range of motion.       Cervical back: Normal range of motion.   Skin:     General: Skin is warm and dry.   Neurological:      General: No focal deficit present.      Mental Status: He is alert and oriented to person, place, and time.   Psychiatric:         Mood and Affect: Mood normal.         Behavior: Behavior normal.         Pathology:  none    Labs:  Lab Results   Component Value Date     04/02/2016    SODIUM 142 01/30/2024    K 3.9 01/30/2024     01/30/2024    CO2 31 01/30/2024    ANIONGAP 4 03/03/2014    AGAP 4 01/30/2024    BUN 17 01/30/2024    CREATININE 1.21 01/30/2024    GLUC 112 01/30/2024    GLUF 98 11/06/2023    CALCIUM 9.0 01/30/2024    AST 16 01/30/2024    ALT 18 01/30/2024    ALKPHOS 60 01/30/2024    PROT 6.9 04/02/2016    TP 7.0 01/30/2024    BILITOT 0.6 04/02/2016    TBILI 0.42 01/30/2024    EGFR 61 01/30/2024         Imaging  MRI abdomen w wo contrast and mrcp    Result Date: 2/13/2024  Narrative: MRI OF THE ABDOMEN WITH AND WITHOUT CONTRAST WITH MRCP INDICATION: 68 years / Male. R68.89: Other general symptoms and signs K86.2: Cyst of pancreas. COMPARISON: No prior MR abdomen available for comparison. Correlation with CT abdomen pelvis December 30, 2023 TECHNIQUE: Multiplanar/multisequence MRI of the abdomen with 3D MRCP was performed before and after administration of contrast. IV Contrast: 8 mL of Gadobutrol injection (SINGLE-DOSE) FINDINGS: LOWER CHEST: Unremarkable. LIVER: Normal in size and configuration. No suspicious mass. Patent hepatic and portal veins. BILE DUCTS: No intrahepatic or extrahepatic bile duct dilation. Common bile duct is normal in caliber. No choledocholithiasis, biliary stricture or suspicious mass. GALLBLADDER: Normal PANCREAS: Two adjacent pancreatic neck cysts measure 2.4 cm (series 11, image 34), and 1.7 cm (series 11, image 33). The cysts communicates with sidebranches of the nondilated main pancreatic duct (series 11, image 37). T2 hypointense, T1 hyperintense tissue measuring  0.5 cm in thickness courses between the cysts, and is intervening pancreatic parenchyma and not a septation given its T1 signal and attenuation on the prior CT  (series 8, image 15; series 13, image 46 and series 3, image 40 on prior CT). There are few thin septations within the both cysts, measuring up to 0.1 cm in thickness (series 7, image 15). There are no solid components of the cysts. ADRENAL GLANDS: 1.1 cm left adrenal cyst (series 9, image 16). No right adrenal lesion. SPLEEN: Normal. KIDNEYS/PROXIMAL URETERS: No hydroureteronephrosis. No suspicious renal mass. Few subcentimeter bilateral renal cysts measure up to 0.8 cm on the right. BOWEL: No dilated loops of bowel. PERITONEUM/RETROPERITONEUM: No ascites. LYMPH NODES: No abdominal lymphadenopathy. VESSELS: No aneurysm. ABDOMINAL WALL: Unremarkable BONES: No suspicious osseous lesion.     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 For simple cyst(s) 2 cm or greater recommend gastroenterology and/or surgical oncology consult. EUS is likely now warranted. rrPreferred imaging modality: abdomen MRI and MRCP with and without IV contrast, or triple phase abdomen CT with IV contrast, or abdomen MRI and MRCP without IV contrast. The study was marked in EPIC for significant notification. Workstation performed: [id number]     MRI prostate multiparametric wo w contrast    Result Date: 2/12/2024  Narrative: MULTIPARAMETRIC MRI OF THE PROSTATE WITH AND WITHOUT CONTRAST-WITH 3-D POSTPROCESSING INDICATION: R97.20: Elevated prostate specific antigen (PSA). COMPARISON: CT abdomen pelvis 12/30/2023. PSA LEVEL: 10.7 ng/mL on 1/23/2024. PRIOR BIOPSY DATE: No prior biopsy. BIOPSY RESULTS: Not applicable. TECHNIQUE: The following pulse sequences were obtained: Small field-of-view axial T1-weighted and multiplanar T2-weighted images; DWI axial and ADC map;  large field of view axial T2 weighted images; T1w in-phase and opposed-phase axials of entire pelvis and dynamic 3D T1w axial before and during IV contrast injection. Imaging performed on 3T MRI. CONTRAST: Gadobutrol (Gadavist) 8 mL of Gadobutrol injection (SINGLE-DOSE) TECHNICAL LIMITATIONS: None. FINDINGS: PROSTATE: Size: 6.2 x 5.3 x 5.8 cm = 94.2 cc. Post-biopsy hemorrhage: None. Central gland enlargement (BPH): Marked. Probable midline prostatic utricle cyst measuring 0.5 cm (series 4 image 16). Focal lesions - No dominant lesion. Heterogeneous peripheral zone. PI-RADSv2.1 Category 2 - Low (clinically significant cancer unlikely). SEMINAL VESICLES: Unremarkable Note: Clinically significant cancer is defined on pathology/histology as [place] score greater than or equal to 7, and/or volume of greater than or equal to 0.5 mL, and/or extraprostatic extension. URINARY BLADDER: Unremarkable. LYMPH NODES: No pelvic lymphadenopathy. BONES: No suspicious osseous lesion. OTHER: Small fat-containing left inguinal hernia. Colonic diverticulosis.     Impression: 1. PI-RADSv2.1 Category 2 - Low (clinically significant cancer is unlikely to be present). 2. No extraprostatic tumor, seminal vesicle invasion, pelvic lymphadenopathy, or pelvic osseous metastatic disease. 3. Calculated prostate volume of 94.2 cc. Prostate gland boundaries were segmented using 3D advanced post-processing on an independent "[employer]" system workstation with active physician participation. Workstation performed: [id number]     I reviewed the above laboratory and imaging data.    Discussion/Summary: Pancreatic cysts, mid body of pancreas, undetermined etiology.  Will set up for GI evaluation and EUS and biopsy, followed by visit in clinic to determine next step in treatment.

## 2024-02-14 NOTE — TELEPHONE ENCOUNTER
Spoke to pt and advised:    MD Whit Iyer MA19 hours ago (3:24 PM)     I sent referral to surgical oncology to review review the pancreas cysts.  Please give him phone number for Dr. Jostin Yadav of surgical oncology     Pt stated he is seeing a surgical oncologist today Dr. Veronica as he did not want to wait for an appt.  He will have PSA done in 6 months unless told otherwise by provider.

## 2024-02-16 ENCOUNTER — PREP FOR PROCEDURE (OUTPATIENT)
Dept: GASTROENTEROLOGY | Facility: MEDICAL CENTER | Age: 69
End: 2024-02-16

## 2024-02-16 DIAGNOSIS — K86.2 PANCREATIC CYST: Primary | ICD-10-CM

## 2024-02-16 NOTE — H&P
Referring physician : Dr. Savage Laureano    Diagnosis : pancreatic cyst    Discussed with patient : YES/NO: no    Symptoms : pancreatic cyst    Labs : normal      Imaging : MRI - reviewed     Prior endoscopy :     Anticoagulation/ Antiplatelet therapy :  none     Assessment :   1. Pancreatic cyst     Plan :   1. EUS    Order placed : YES/NO: yes    Informed scheduling staff : YES/NO: yes     Discussed with GI lab : YES/NO: no

## 2024-02-20 ENCOUNTER — TELEPHONE (OUTPATIENT)
Dept: GASTROENTEROLOGY | Facility: CLINIC | Age: 69
End: 2024-02-20

## 2024-02-20 NOTE — TELEPHONE ENCOUNTER
----- Message from Jason Harrington MD sent at 2/16/2024 12:37 PM EST -----  Please schedule EUS in March 5-15th.   Thank you.   Jason

## 2024-02-20 NOTE — TELEPHONE ENCOUNTER
Procedure: EUS  Scheduled date of procedure (as of today):  5/7/24  Physician performing procedure: Dr. Harrington  Location of procedure: [place]   Instructions reviewed with patient by:  Caro   Clearances:  n/a

## 2024-02-21 PROBLEM — Z12.5 SCREENING FOR PROSTATE CANCER: Status: RESOLVED | Noted: 2019-09-23 | Resolved: 2024-02-21

## 2024-03-03 DIAGNOSIS — I10 ESSENTIAL HYPERTENSION: ICD-10-CM

## 2024-03-03 RX ORDER — LISINOPRIL 5 MG/1
5 TABLET ORAL DAILY
Qty: 90 TABLET | Refills: 1 | Status: SHIPPED | OUTPATIENT
Start: 2024-03-03

## 2024-03-12 ENCOUNTER — CONSULT (OUTPATIENT)
Dept: GASTROENTEROLOGY | Facility: CLINIC | Age: 69
End: 2024-03-12
Payer: MEDICARE

## 2024-03-12 VITALS
SYSTOLIC BLOOD PRESSURE: 120 MMHG | TEMPERATURE: 97.3 F | HEIGHT: 67 IN | BODY MASS INDEX: 27.5 KG/M2 | DIASTOLIC BLOOD PRESSURE: 72 MMHG | WEIGHT: 175.2 LBS

## 2024-03-12 DIAGNOSIS — K86.2 PANCREATIC CYST: Primary | ICD-10-CM

## 2024-03-12 PROCEDURE — 99203 OFFICE O/P NEW LOW 30 MIN: CPT | Performed by: PHYSICIAN ASSISTANT

## 2024-03-12 NOTE — PROGRESS NOTES
[facility] - Outpatient Follow-up Note  Yunier Pichardo 68 y.o. male MRN: [id number]  Encounter: [id number]  ASSESSMENT AND PLAN:    1. Pancreatic cyst  We reviewed his recent CT scan and MRI abdomen results.  He is currently feeling well without complaints or alarming symptoms.  He is currently scheduled for an endoscopic ultrasound in May.    I provided education on pancreatic cysts and IPMNs.  Patient expressed understanding.  Discussed with patient that I suspect cysts to be benign but they will likely require ongoing imaging/surveillance.  Patient will keep his upcoming endoscopic ultrasound as scheduled  Patient was appreciative of visit today.  All questions answered.    - Ambulatory Referral to Gastroenterology    Patient was instructed to call the office with any questions, concerns, new/ worsening/ persisting GI symptoms. Advised patient go to the ER with any severe or worsening abdominal pain, fevers/ chills, intractable N/V, chest pain, SOB, dizziness, lightheadedness, feeling something stuck in esophagus that will not go down. Patient expressed understanding and is in agreement with treatment plan.     Will plan to follow up after diagnostic tests in ~ 3 months  __________________________________________________________    SUBJECTIVE:      Patient is new to me in our office.  Patient with a past medical history of hypertension, obstructive sleep apnea, history of colon polyps, BPH, history of TIA, hyperlipidemia presents to the office today to discuss pancreatic cyst seen on MRI.  Of note patient is scheduled for endoscopic ultrasound 5/7/2024 with Dr Harrington.    Patient reports feeling well. No GI complaints or concerns today. His wife is present for visit today.   BM's are regular and stool is brown and formed.   Patient denies any fevers/ chills, unintentional weight loss, decreased appetite, abdominal pain, nausea, vomiting, change in bowel habits, diarrhea, constipation,  black or bloody stools, heartburn, dysphagia, odynophagia.   Denies chest pain, SOB  No history of diabetes or jaundice.    Patient does not have a cardiac pacemaker or defibrillator. No supplemental oxygen use at home. No kidney disease. Patient denies anticoagulation medications/ blood thinners.       Review of Systems   Constitutional:  Negative for chills and fever.   HENT:  Negative for ear pain and sore throat.    Eyes:  Negative for pain and visual disturbance.   Respiratory:  Negative for cough and shortness of breath.    Cardiovascular:  Negative for chest pain and palpitations.   Gastrointestinal:  Negative for abdominal pain, constipation, diarrhea, nausea and vomiting.   Genitourinary:  Negative for dysuria, frequency and hematuria.   Musculoskeletal:  Negative for arthralgias, back pain and myalgias.   Skin:  Negative for color change and rash.   Neurological:  Negative for seizures, syncope and headaches.   All other systems reviewed and are negative.         Historical Information   Past Medical History:   Diagnosis Date    Hyperglycemia     Resolved 3/31/2016     Hypertension      Past Surgical History:   Procedure Laterality Date    CARDIAC LOOP RECORDER      CARDIAC SURGERY      COLONOSCOPY      Proctitis, otherwise nml. Dr. Moore. Resolved 12/3/2008      Social History   Social History     Substance and Sexual Activity   Alcohol Use Yes    Comment: Social      Social History     Substance and Sexual Activity   Drug Use Never     Social History     Tobacco Use   Smoking Status Never   Smokeless Tobacco Never     Family History   Problem Relation Age of Onset    Dementia Mother     Stroke Father     Hypertension Father     No Known Problems Sister     No Known Problems Daughter     No Known Problems Daughter     No Known Problems Sister     No Known Problems Sister     No Known Problems Sister        Meds/Allergies       Current Outpatient Medications:     aspirin (ECOTRIN LOW STRENGTH) 81 mg EC  tablet    atorvastatin (LIPITOR) 40 mg tablet    lisinopril (ZESTRIL) 5 mg tablet    multivitamin (THERAGRAN) TABS    tamsulosin (FLOMAX) 0.4 mg    No Known Allergies        Objective     Wt Readings from Last 3 Encounters:   02/14/24 81.7 kg (180 lb 3.2 oz)   12/05/23 79.4 kg (175 lb)   11/30/23 79.4 kg (175 lb)     Temp Readings from Last 3 Encounters:   02/14/24 (!) 97.4 °F (36.3 °C) (Temporal)   12/05/23 98.1 °F (36.7 °C) (Temporal)   12/30/22 (!) 97.4 °F (36.3 °C)     BP Readings from Last 3 Encounters:   02/14/24 148/60   12/05/23 126/63   11/30/23 106/60     Pulse Readings from Last 3 Encounters:   02/14/24 63   12/05/23 (!) 52   11/30/23 60          PHYSICAL EXAM:      Physical Exam  Vitals reviewed.   Constitutional:       General: He is not in acute distress.     Appearance: He is well-developed.   HENT:      Head: Normocephalic and atraumatic.   Eyes:      Conjunctiva/sclera: Conjunctivae normal.   Cardiovascular:      Rate and Rhythm: Normal rate and regular rhythm.      Heart sounds: No murmur heard.  Pulmonary:      Effort: Pulmonary effort is normal. No respiratory distress.      Breath sounds: Normal breath sounds.   Abdominal:      General: Bowel sounds are normal.      Palpations: Abdomen is soft.      Tenderness: There is no abdominal tenderness.   Musculoskeletal:         General: No swelling or tenderness.      Cervical back: Neck supple.   Skin:     General: Skin is warm and dry.      Capillary Refill: Capillary refill takes less than 2 seconds.   Neurological:      General: No focal deficit present.      Mental Status: He is alert and oriented to person, place, and time.   Psychiatric:         Mood and Affect: Mood normal.         Behavior: Behavior normal.     Lab Results:   Lab Results   Component Value Date    WBC 7.96 01/30/2024    HGB 13.9 01/30/2024    HCT 41.1 01/30/2024    MCV 92 01/30/2024     01/30/2024       Lab Results   Component Value Date     04/02/2016    SODIUM  142 01/30/2024    K 3.9 01/30/2024     01/30/2024    CO2 31 01/30/2024    ANIONGAP 4 03/03/2014    AGAP 4 01/30/2024    BUN 17 01/30/2024    CREATININE 1.21 01/30/2024    GLUC 112 01/30/2024    GLUF 98 11/06/2023    CALCIUM 9.0 01/30/2024    AST 16 01/30/2024    ALT 18 01/30/2024    ALKPHOS 60 01/30/2024    PROT 6.9 04/02/2016    TP 7.0 01/30/2024    BILITOT 0.6 04/02/2016    TBILI 0.42 01/30/2024    EGFR 61 01/30/2024     Radiology Results:   Patient underwent CT scan of the abdomen pelvis with and without IV contrast 12/30/2023 for gross hematuria, this was reviewed, this showed no CT abnormality identified to account for hematuria, markedly enlarged prostate.  A 3.7 cm multilobulated cystic lesion was seen in the pancreatic body.  It was recommended MRI for further evaluation.    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  It was recommended patient undergo EUS.    Prior Procedure Results/Reports   Last Colonoscopy reviewed done 12/5/2023 for history of colon polyps and showed 2 subcentimeter colon polyps which were removed, sigmoid diverticulosis, small hemorrhoids, otherwise normal colonic mucosa.  1 colon polyp was hyperplastic, one was tubular adenoma.  A repeat colonoscopy was recommended in 7 years.    Venice Silva PA-C   Available on [employer]

## 2024-04-04 DIAGNOSIS — N13.8 BPH WITH URINARY OBSTRUCTION: ICD-10-CM

## 2024-04-04 DIAGNOSIS — N40.1 BPH WITH URINARY OBSTRUCTION: ICD-10-CM

## 2024-04-04 RX ORDER — TAMSULOSIN HYDROCHLORIDE 0.4 MG/1
CAPSULE ORAL
Qty: 90 CAPSULE | Refills: 1 | Status: SHIPPED | OUTPATIENT
Start: 2024-04-04

## 2024-05-07 ENCOUNTER — ANESTHESIA EVENT (OUTPATIENT)
Dept: GASTROENTEROLOGY | Facility: HOSPITAL | Age: 69
End: 2024-05-07

## 2024-05-07 ENCOUNTER — ANESTHESIA (OUTPATIENT)
Dept: GASTROENTEROLOGY | Facility: HOSPITAL | Age: 69
End: 2024-05-07

## 2024-05-07 ENCOUNTER — HOSPITAL ENCOUNTER (OUTPATIENT)
Dept: GASTROENTEROLOGY | Facility: HOSPITAL | Age: 69
Setting detail: OUTPATIENT SURGERY
Discharge: HOME | End: 2024-05-07
Attending: INTERNAL MEDICINE | Admitting: INTERNAL MEDICINE
Payer: MEDICARE

## 2024-05-07 VITALS
SYSTOLIC BLOOD PRESSURE: 116 MMHG | OXYGEN SATURATION: 97 % | TEMPERATURE: 96.6 F | DIASTOLIC BLOOD PRESSURE: 66 MMHG | RESPIRATION RATE: 18 BRPM | HEART RATE: 50 BPM

## 2024-05-07 DIAGNOSIS — K86.2 PANCREATIC CYST: ICD-10-CM

## 2024-05-07 PROBLEM — G45.9 TIA (TRANSIENT ISCHEMIC ATTACK): Status: ACTIVE | Noted: 2024-05-07

## 2024-05-07 PROCEDURE — C1889 IMPLANT/INSERT DEVICE, NOC: HCPCS

## 2024-05-07 PROCEDURE — 43242 EGD US FINE NEEDLE BX/ASPIR: CPT | Performed by: INTERNAL MEDICINE

## 2024-05-07 RX ORDER — CIPROFLOXACIN 500 MG/1
500 TABLET, FILM COATED ORAL EVERY 24 HOURS
Qty: 2 TABLET | Refills: 0 | Status: SHIPPED | OUTPATIENT
Start: 2024-05-08 | End: 2024-05-10

## 2024-05-07 RX ORDER — FENTANYL CITRATE 50 UG/ML
INJECTION, SOLUTION INTRAMUSCULAR; INTRAVENOUS AS NEEDED
Status: DISCONTINUED | OUTPATIENT
Start: 2024-05-07 | End: 2024-05-07

## 2024-05-07 RX ORDER — CIPROFLOXACIN 2 MG/ML
INJECTION, SOLUTION INTRAVENOUS CONTINUOUS PRN
Status: DISCONTINUED | OUTPATIENT
Start: 2024-05-07 | End: 2024-05-07

## 2024-05-07 RX ORDER — LIDOCAINE HYDROCHLORIDE 10 MG/ML
INJECTION, SOLUTION EPIDURAL; INFILTRATION; INTRACAUDAL; PERINEURAL AS NEEDED
Status: DISCONTINUED | OUTPATIENT
Start: 2024-05-07 | End: 2024-05-07

## 2024-05-07 RX ORDER — PROPOFOL 10 MG/ML
INJECTION, EMULSION INTRAVENOUS AS NEEDED
Status: DISCONTINUED | OUTPATIENT
Start: 2024-05-07 | End: 2024-05-07

## 2024-05-07 RX ORDER — PROPOFOL 10 MG/ML
INJECTION, EMULSION INTRAVENOUS CONTINUOUS PRN
Status: DISCONTINUED | OUTPATIENT
Start: 2024-05-07 | End: 2024-05-07

## 2024-05-07 RX ORDER — SODIUM CHLORIDE 9 MG/ML
INJECTION, SOLUTION INTRAVENOUS CONTINUOUS PRN
Status: DISCONTINUED | OUTPATIENT
Start: 2024-05-07 | End: 2024-05-07

## 2024-05-07 RX ADMIN — LIDOCAINE HYDROCHLORIDE 100 MG: 10 INJECTION, SOLUTION EPIDURAL; INFILTRATION; INTRACAUDAL; PERINEURAL at 12:50

## 2024-05-07 RX ADMIN — SODIUM CHLORIDE: 0.9 INJECTION, SOLUTION INTRAVENOUS at 12:45

## 2024-05-07 RX ADMIN — CIPROFLOXACIN: 400 INJECTION, SOLUTION INTRAVENOUS at 13:13

## 2024-05-07 RX ADMIN — PROPOFOL 120 MCG/KG/MIN: 10 INJECTION, EMULSION INTRAVENOUS at 12:50

## 2024-05-07 RX ADMIN — PROPOFOL 100 MG: 10 INJECTION, EMULSION INTRAVENOUS at 12:50

## 2024-05-07 RX ADMIN — FENTANYL CITRATE 25 MCG: 50 INJECTION INTRAMUSCULAR; INTRAVENOUS at 12:58

## 2024-05-07 RX ADMIN — FENTANYL CITRATE 25 MCG: 50 INJECTION INTRAMUSCULAR; INTRAVENOUS at 13:17

## 2024-05-07 NOTE — ANESTHESIA POSTPROCEDURE EVALUATION
Post-Op Assessment Note    CV Status:  Stable    Pain management: adequate       Mental Status:  Awake and sleepy   Hydration Status:  Euvolemic   PONV Controlled:  Controlled   Airway Patency:  Patent     Post Op Vitals Reviewed: Yes    No anethesia notable event occurred.    Staff: Anesthesiologist               BP   117/63   Temp   96.6   Pulse  48   Resp   10   SpO2   99%

## 2024-05-07 NOTE — ANESTHESIA PREPROCEDURE EVALUATION
"Procedure:  ENDOSCOPIC ULTRASOUND (UPPER)    Relevant Problems   CARDIO   (+) Essential hypertension   (+) Mixed hyperlipidemia      /RENAL   (+) BPH with urinary obstruction      NEURO/PSYCH   (+) TIA (transient ischemic attack)      PULMONARY   (+) HAROON (obstructive sleep apnea)      Neurology/Sleep   (+) History of transient ischemic attack (TIA)      Other   (+) Pancreatic mass        Physical Exam    Airway    Mallampati score: III  TM Distance: >3 FB  Neck ROM: limited     Dental        Cardiovascular      Pulmonary      Other Findings        Anesthesia Plan  ASA Score- 3     Anesthesia Type- IV sedation with anesthesia with ASA Monitors.         Additional Monitors:     Airway Plan:            Plan Factors-    Chart reviewed.  Imaging results reviewed. Existing labs reviewed. Patient summary reviewed.    Patient is not a current smoker.  Patient did not smoke on day of surgery.            Induction- intravenous.    Postoperative Plan-     Informed Consent- Anesthetic plan and risks discussed with patient.  I personally reviewed this patient with the CRNA. Discussed and agreed on the Anesthesia Plan with the CRNA..            VITALS  /59   Pulse (!) 51   Temp (!) 97.2 °F (36.2 °C) (Tympanic)   Resp 18   SpO2 98%   BP Readings from Last 3 Encounters:   05/07/24 129/59   03/12/24 120/72   02/14/24 148/60     LABS  Results from Last 12 Months   Lab Units 01/30/24  1344   WBC Thousand/uL 7.96   HEMOGLOBIN g/dL 13.9   HEMATOCRIT % 41.1   PLATELETS Thousands/uL 224     Results from Last 12 Months   Lab Units 01/30/24  1344 11/06/23  1010   SODIUM mmol/L 142 142   POTASSIUM mmol/L 3.9 4.5   CHLORIDE mmol/L 107 107   CO2 mmol/L 31 28   ANION GAP mmol/L 4 7   BUN mg/dL 17 16   CREATININE mg/dL 1.21 1.22   CALCIUM mg/dL 9.0 8.9   GLUCOSE RANDOM mg/dL 112  --      No results for input(s): \"APTT\", \"INR\", \"PTT\" in the last 8784 hours.    ECG  Holter monitor in place but no results available.    ECHOCARDIOGRAPHY " AND OTHER TESTING/IMAGING  2022 TTE  This result has an attachment that is not available.     Left Ventricle: Left ventricle is normal in size. Wall thickness is   normal. Systolic function is normal with an ejection fraction of 60-65%.   Wall motion is within normal limits.     Left Atrium: Left atrium cavity is mildly dilated. There is no thrombus   in the cavity or appendage. Patent foramen ovale visualized. No shunt   visualized by color Doppler. Bubble study was performed with agitated   saline contrast x2 and positive for early bubbles, suggestive of R>L   intracardiac shunt.     Ascending Aorta: The aorta appears normal in size for given age and BSA   (aortic root at Sinus of Valsalva measures approximately 3.7 cm). There is   a small amount of layered plaque in the descending aorta, and a moderate   amount of layered plaque in the transverse aorta.     Ordering provider aware of findings.       ANESTHESIA RISK-BENEFIT DISCUSSION  BENEFITS OF A SPECIALIZED ANESTHESIA TEAM INCLUDE (NBK [id number], PMID 44480430):  (1) Reduce mortality and morbidity for major surgeries/procedures. (2) The team provides analgesia/sedation/amnesia/akinesia as safely as possible. (3) The team strives to reduce discomfort as safely as possible.    RISKS, AND PLANS TO MITIGATE RISKS, INCLUDE:    - Neurologic system: IntraOp awareness (Risk is ~1:1,000 - 1:14,000; PMID 06455899), Stroke (Risk ~<0.1-2% for most cases; PMID 08985401), nerve injury, vision loss, and POCD.     - Airway and Pulmonary system: Dental or mouth injury, throat pain, critical hypoxia, pneumothorax, prolonged intubation, post-op respiratory compromise.  Airway/Intubation risks and prior data: No prior advanced airway notes in [facility] EMR, HAROON  Major ARISCAT risk factors for pulmonary complications include: none, yielding a score of 0-1= Low risk, 1.6%.  - Cardiovascular system: Hypotension, arrhythmias, cardiac injury or arrest, blood clots, bleeding, infection,  vascular injuries.  Duy's RCRI score items: CVA/TIA, yielding an RCRI Score of 1= 0.6% risk of MACE  Are grazyna-op or intra-op beta blockers indicated? (PMID 65710954): no  - FEN/GI system: Aspiration risk (~0.5% [facility] [id number]) and PONV (10-80% per Apfel score) especially if the patient has not fasted.  ASA NPO guideline compliance?: Yes  - Medication risk assessment: Allergic reactions, excessive bleeding with anticoagulant use, overdoses, drug-drug interactions, injury to a fetus or  in pregnant or breastfeeding patients, grazyna-procedural sedation including while driving/operating machinery.  Recent relevant medications: See MAR or Med Review  Personal or family history of anesthesia complications: no  Pregnancy Status: N/A  - Estimate mortality risks associated with anesthesia based on ASA-PS (PMID 80938462): ASA-PS III: 1:3,500

## 2024-05-07 NOTE — H&P
History and Physical -  [facility]  Yunier Pichardo 68 y.o. male MRN: [id number]                  HPI: Yunier Pichardo is a 68 y.o. year old male who presents for pancreatic cysts      REVIEW OF SYSTEMS: Per the HPI, and otherwise unremarkable.    Historical Information   Past Medical History:   Diagnosis Date    Hyperglycemia     Resolved 3/31/2016     Hypertension     Obstructive sleep apnea on CPAP     Sleep apnea     TIA (transient ischemic attack)      Past Surgical History:   Procedure Laterality Date    CARDIAC LOOP RECORDER      CARDIAC SURGERY      COLONOSCOPY      Proctitis, otherwise nml. Dr. Moore. Resolved 12/3/2008      Social History   Social History     Substance and Sexual Activity   Alcohol Use Yes    Comment: Social      Social History     Substance and Sexual Activity   Drug Use Never     Social History     Tobacco Use   Smoking Status Never   Smokeless Tobacco Never     Family History   Problem Relation Age of Onset    Dementia Mother     Stroke Father     Hypertension Father     No Known Problems Sister     No Known Problems Daughter     No Known Problems Daughter     No Known Problems Sister     No Known Problems Sister     No Known Problems Sister        Meds/Allergies       Current Outpatient Medications:     aspirin (ECOTRIN LOW STRENGTH) 81 mg EC tablet    atorvastatin (LIPITOR) 40 mg tablet    lisinopril (ZESTRIL) 5 mg tablet    multivitamin (THERAGRAN) TABS    tamsulosin (FLOMAX) 0.4 mg    No Known Allergies    Objective     /59   Pulse (!) 51   Temp (!) 97.2 °F (36.2 °C) (Tympanic)   Resp 18   SpO2 98%       PHYSICAL EXAM    Gen: NAD  Head: NCAT  CV: RRR  CHEST: Clear  ABD: soft, NT/ND  EXT: no edema      ASSESSMENT/PLAN:  This is a 68 y.o. year old male here for egd eus, and he is stable and optimized for his procedure.

## 2024-05-08 ENCOUNTER — RA CDI HCC (OUTPATIENT)
Dept: OTHER | Facility: HOSPITAL | Age: 69
End: 2024-05-08

## 2024-05-08 ENCOUNTER — APPOINTMENT (OUTPATIENT)
Dept: LAB | Facility: CLINIC | Age: 69
End: 2024-05-08
Payer: MEDICARE

## 2024-05-08 DIAGNOSIS — R97.20 ELEVATED PROSTATE SPECIFIC ANTIGEN (PSA): ICD-10-CM

## 2024-05-08 LAB — PSA SERPL-MCNC: 4.49 NG/ML (ref 0–4)

## 2024-05-08 PROCEDURE — 36415 COLL VENOUS BLD VENIPUNCTURE: CPT

## 2024-05-08 PROCEDURE — 84153 ASSAY OF PSA TOTAL: CPT

## 2024-05-13 ENCOUNTER — OFFICE VISIT (OUTPATIENT)
Dept: UROLOGY | Facility: MEDICAL CENTER | Age: 69
End: 2024-05-13
Payer: MEDICARE

## 2024-05-13 VITALS
HEIGHT: 67 IN | DIASTOLIC BLOOD PRESSURE: 60 MMHG | BODY MASS INDEX: 27.94 KG/M2 | WEIGHT: 178 LBS | SYSTOLIC BLOOD PRESSURE: 124 MMHG | OXYGEN SATURATION: 98 % | HEART RATE: 62 BPM

## 2024-05-13 DIAGNOSIS — R97.20 ELEVATED PROSTATE SPECIFIC ANTIGEN (PSA): Primary | ICD-10-CM

## 2024-05-13 PROCEDURE — 99213 OFFICE O/P EST LOW 20 MIN: CPT | Performed by: UROLOGY

## 2024-05-13 NOTE — PROGRESS NOTES
"   HISTORY:    Follow-up:    1.  Elevated PSA in the past.    See numbers below, now down to 4.5    MRI in February 2024 showed nothing suspicious.  94 mL prostate    2.  History of gross hematuria, cystoscopy showed large prostate    3.  BPH, on tamsulosin.  Symptoms greatly improved, quite pleased with it         ASSESSMENT / PLAN:    Doing well    Unclear why PSA took such a spike to over 10.    Check PSA in 6 months with phone report, and then follow-up 1 year    The following portions of the patient's history were reviewed and updated as appropriate: allergies, current medications, past family history, past medical history, past social history, past surgical history, and problem list.    Review of Systems      Objective:     Physical Exam      0   Lab Value Date/Time    PSA 4.49 (H) 05/08/2024 1153    PSA 10.73 (H) 01/23/2024 0954    PSA 5.2 (H) 11/08/2023 1436    PSA 6.00 (H) 11/06/2023 1010    PSA 3.81 06/29/2022 0934    PSA 3.6 12/24/2020 0915   ]  BUN   Date Value Ref Range Status   01/30/2024 17 5 - 25 mg/dL Final   03/22/2023 10 7 - 28 mg/dL Final     Creatinine   Date Value Ref Range Status   01/30/2024 1.21 0.60 - 1.30 mg/dL Final     Comment:     Standardized to IDMS reference method   03/22/2023 1.12 0.53 - 1.30 mg/dL Final     No components found for: \"CBC\"      Patient Active Problem List   Diagnosis    Essential hypertension    Overweight    Annual physical exam    Elevated prostate specific antigen (PSA)    BPH with urinary obstruction    History of transient ischemic attack (TIA)    Mixed hyperlipidemia    Skin tags, multiple acquired    Tubular adenoma of colon    HAROON (obstructive sleep apnea)    Dribbling of urine    Pancreatic mass    TIA (transient ischemic attack)        Diagnoses and all orders for this visit:    Elevated prostate specific antigen (PSA)  -     Cancel: PSA Total, Diagnostic; Future  -     Cancel: PSA Total, Diagnostic; Future  -     PSA Total, Diagnostic; Future           " Patient ID: Yunier Pichardo is a 68 y.o. male.      Current Outpatient Medications:     aspirin (ECOTRIN LOW STRENGTH) 81 mg EC tablet, Take 81 mg by mouth daily, Disp: , Rfl:     atorvastatin (LIPITOR) 40 mg tablet, TAKE ONE TABLET BY MOUTH ONCE DAILY, Disp: 90 tablet, Rfl: 1    lisinopril (ZESTRIL) 5 mg tablet, TAKE 1 TABLET BY MOUTH ONCE DAILY, Disp: 90 tablet, Rfl: 1    multivitamin (THERAGRAN) TABS, Take 1 tablet by mouth daily, Disp: , Rfl:     tamsulosin (FLOMAX) 0.4 mg, TAKE 1 CAPSULE BY MOUTH ONCE DAILY WITH DINNER, Disp: 90 capsule, Rfl: 1    Past Medical History:   Diagnosis Date    Hyperglycemia     Resolved 3/31/2016     Hypertension     Obstructive sleep apnea on CPAP     Sleep apnea     TIA (transient ischemic attack) 5/7/2024       Past Surgical History:   Procedure Laterality Date    CARDIAC LOOP RECORDER      CARDIAC SURGERY      COLONOSCOPY      Proctitis, otherwise nml. Dr. Moore. Resolved 12/3/2008        Social History

## 2024-05-14 ENCOUNTER — TELEPHONE (OUTPATIENT)
Dept: GASTROENTEROLOGY | Facility: CLINIC | Age: 69
End: 2024-05-14

## 2024-05-14 ENCOUNTER — OFFICE VISIT (OUTPATIENT)
Dept: FAMILY MEDICINE CLINIC | Facility: CLINIC | Age: 69
End: 2024-05-14
Payer: MEDICARE

## 2024-05-14 VITALS
HEIGHT: 67 IN | WEIGHT: 177 LBS | DIASTOLIC BLOOD PRESSURE: 72 MMHG | BODY MASS INDEX: 27.78 KG/M2 | SYSTOLIC BLOOD PRESSURE: 120 MMHG

## 2024-05-14 DIAGNOSIS — E78.2 MIXED HYPERLIPIDEMIA: ICD-10-CM

## 2024-05-14 DIAGNOSIS — Z00.00 MEDICARE ANNUAL WELLNESS VISIT, SUBSEQUENT: Primary | ICD-10-CM

## 2024-05-14 DIAGNOSIS — Z86.73 HISTORY OF TRANSIENT ISCHEMIC ATTACK (TIA): ICD-10-CM

## 2024-05-14 DIAGNOSIS — N40.1 BPH WITH URINARY OBSTRUCTION: ICD-10-CM

## 2024-05-14 DIAGNOSIS — R97.20 ELEVATED PROSTATE SPECIFIC ANTIGEN (PSA): ICD-10-CM

## 2024-05-14 DIAGNOSIS — N13.8 BPH WITH URINARY OBSTRUCTION: ICD-10-CM

## 2024-05-14 DIAGNOSIS — G47.33 OSA (OBSTRUCTIVE SLEEP APNEA): ICD-10-CM

## 2024-05-14 DIAGNOSIS — D12.6 TUBULAR ADENOMA OF COLON: ICD-10-CM

## 2024-05-14 DIAGNOSIS — K86.89 PANCREATIC MASS: ICD-10-CM

## 2024-05-14 DIAGNOSIS — R41.3 MEMORY LOSS: ICD-10-CM

## 2024-05-14 DIAGNOSIS — Z13.6 SCREENING FOR AAA (ABDOMINAL AORTIC ANEURYSM): ICD-10-CM

## 2024-05-14 DIAGNOSIS — I10 ESSENTIAL HYPERTENSION: ICD-10-CM

## 2024-05-14 DIAGNOSIS — Z86.73 HISTORY OF TRANSIENT ISCHEMIC ATTACK (TIA): Primary | ICD-10-CM

## 2024-05-14 DIAGNOSIS — E66.3 OVERWEIGHT: ICD-10-CM

## 2024-05-14 PROBLEM — N39.43 DRIBBLING OF URINE: Status: RESOLVED | Noted: 2023-11-08 | Resolved: 2024-05-14

## 2024-05-14 PROBLEM — L91.8 SKIN TAGS, MULTIPLE ACQUIRED: Status: RESOLVED | Noted: 2022-03-28 | Resolved: 2024-05-14

## 2024-05-14 PROBLEM — G45.9 TIA (TRANSIENT ISCHEMIC ATTACK): Status: RESOLVED | Noted: 2024-05-07 | Resolved: 2024-05-14

## 2024-05-14 PROCEDURE — G0438 PPPS, INITIAL VISIT: HCPCS | Performed by: FAMILY MEDICINE

## 2024-05-14 PROCEDURE — 99214 OFFICE O/P EST MOD 30 MIN: CPT | Performed by: FAMILY MEDICINE

## 2024-05-14 NOTE — ASSESSMENT & PLAN NOTE
Patient had TIA No followup with neurology since 2022 Patient and wife feel he is slightly forgetful and they would like to see neurology again - referral given Patient will need to keep BP < 130 so continue lisinopril  and will need to stay on lipitor with LDL goal <80 and also will have followup labs

## 2024-05-14 NOTE — TELEPHONE ENCOUNTER
I called the patient, discussed results of EUS FNA which showed IPMN.  Discussed that the next steps would be to follow-up with surgical oncology.  I have also messaged surgical oncology medical assistant and have asked to help set up follow up.

## 2024-05-14 NOTE — PATIENT INSTRUCTIONS
Medicare Preventive Visit Patient Instructions  Thank you for completing your Welcome to Medicare Visit or Medicare Annual Wellness Visit today. Your next wellness visit will be due in one year (5/15/2025).  The screening/preventive services that you may require over the next 5-10 years are detailed below. Some tests may not apply to you based off risk factors and/or age. Screening tests ordered at today's visit but not completed yet may show as past due. Also, please note that scanned in results may not display below.  Preventive Screenings:  Service Recommendations Previous Testing/Comments   Colorectal Cancer Screening  Colonoscopy    Fecal Occult Blood Test (FOBT)/Fecal Immunochemical Test (FIT)  Fecal DNA/Cologuard Test  Flexible Sigmoidoscopy Age: 45-75 years old   Colonoscopy: every 10 years (May be performed more frequently if at higher risk)  OR  FOBT/FIT: every 1 year  OR  Cologuard: every 3 years  OR  Sigmoidoscopy: every 5 years  Screening may be recommended earlier than age 45 if at higher risk for colorectal cancer. Also, an individualized decision between you and your healthcare provider will decide whether screening between the ages of 76-85 would be appropriate. Colonoscopy: 12/05/2023  FOBT/FIT: Not on file  Cologuard: Not on file  Sigmoidoscopy: Not on file    Screening Current     Prostate Cancer Screening Individualized decision between patient and health care provider in men between ages of 55-69   Medicare will cover every 12 months beginning on the day after your 50th birthday PSA: 4.49 ng/mL     Screening Current     Hepatitis C Screening Once for adults born between 1945 and 1965  More frequently in patients at high risk for Hepatitis C Hep C Antibody: 04/30/2020    Screening Current   Diabetes Screening 1-2 times per year if you're at risk for diabetes or have pre-diabetes Fasting glucose: 98 mg/dL (11/6/2023)  A1C: 5.6 % (12/24/2021)  Screening Current   Cholesterol Screening Once every 5  years if you don't have a lipid disorder. May order more often based on risk factors. Lipid panel: 11/06/2023  Screening Not Indicated  History Lipid Disorder      Other Preventive Screenings Covered by Medicare:  Abdominal Aortic Aneurysm (AAA) Screening: covered once if your at risk. You're considered to be at risk if you have a family history of AAA or a male between the age of 65-75 who smoking at least 100 cigarettes in your lifetime.  Lung Cancer Screening: covers low dose CT scan once per year if you meet all of the following conditions: (1) Age 55-77; (2) No signs or symptoms of lung cancer; (3) Current smoker or have quit smoking within the last 15 years; (4) You have a tobacco smoking history of at least 20 pack years (packs per day x number of years you smoked); (5) You get a written order from a healthcare provider.  Glaucoma Screening: covered annually if you're considered high risk: (1) You have diabetes OR (2) Family history of glaucoma OR (3)  aged 50 and older OR (4)  American aged 65 and older  Osteoporosis Screening: covered every 2 years if you meet one of the following conditions: (1) Have a vertebral abnormality; (2) On glucocorticoid therapy for more than 3 months; (3) Have primary hyperparathyroidism; (4) On osteoporosis medications and need to assess response to drug therapy.  HIV Screening: covered annually if you're between the age of 15-65. Also covered annually if you are younger than 15 and older than 65 with risk factors for HIV infection. For pregnant patients, it is covered up to 3 times per pregnancy.    Immunizations:  Immunization Recommendations   Influenza Vaccine Annual influenza vaccination during flu season is recommended for all persons aged >= 6 months who do not have contraindications   Pneumococcal Vaccine   * Pneumococcal conjugate vaccine = PCV13 (Prevnar 13), PCV15 (Vaxneuvance), PCV20 (Prevnar 20)  * Pneumococcal polysaccharide vaccine = PPSV23  (Pneumovax) Adults 19-63 yo with certain risk factors or if 65+ yo  If never received any pneumonia vaccine: recommend Prevnar 20 (PCV20)  Give PCV20 if previously received 1 dose of PCV13 or PPSV23   Hepatitis B Vaccine 3 dose series if at intermediate or high risk (ex: diabetes, end stage renal disease, liver disease)   Respiratory syncytial virus (RSV) Vaccine - COVERED BY MEDICARE PART D  * RSVPreF3 (Arexvy) CDC recommends that adults 60 years of age and older may receive a single dose of RSV vaccine using shared clinical decision-making (SCDM)   Tetanus (Td) Vaccine - COST NOT COVERED BY MEDICARE PART B Following completion of primary series, a booster dose should be given every 10 years to maintain immunity against tetanus. Td may also be given as tetanus wound prophylaxis.   Tdap Vaccine - COST NOT COVERED BY MEDICARE PART B Recommended at least once for all adults. For pregnant patients, recommended with each pregnancy.   Shingles Vaccine (Shingrix) - COST NOT COVERED BY MEDICARE PART B  2 shot series recommended in those 19 years and older who have or will have weakened immune systems or those 50 years and older     Health Maintenance Due:      Topic Date Due   • Colorectal Cancer Screening  12/03/2030   • Hepatitis C Screening  Completed     Immunizations Due:      Topic Date Due   • IPV Vaccine (2 of 3 - Adult catch-up series) 07/29/2001   • COVID-19 Vaccine (7 - 2023-24 season) 12/26/2023     Advance Directives   What are advance directives?  Advance directives are legal documents that state your wishes and plans for medical care. These plans are made ahead of time in case you lose your ability to make decisions for yourself. Advance directives can apply to any medical decision, such as the treatments you want, and if you want to donate organs.   What are the types of advance directives?  There are many types of advance directives, and each state has rules about how to use them. You may choose a  combination of any of the following:  Living will:  This is a written record of the treatment you want. You can also choose which treatments you do not want, which to limit, and which to stop at a certain time. This includes surgery, medicine, IV fluid, and tube feedings.   Durable power of  for healthcare (DPAHC):  This is a written record that states who you want to make healthcare choices for you when you are unable to make them for yourself. This person, called a proxy, is usually a family member or a friend. You may choose more than 1 proxy.  Do not resuscitate (DNR) order:  A DNR order is used in case your heart stops beating or you stop breathing. It is a request not to have certain forms of treatment, such as CPR. A DNR order may be included in other types of advance directives.  Medical directive:  This covers the care that you want if you are in a coma, near death, or unable to make decisions for yourself. You can list the treatments you want for each condition. Treatment may include pain medicine, surgery, blood transfusions, dialysis, IV or tube feedings, and a ventilator (breathing machine).  Values history:  This document has questions about your views, beliefs, and how you feel and think about life. This information can help others choose the care that you would choose.  Why are advance directives important?  An advance directive helps you control your care. Although spoken wishes may be used, it is better to have your wishes written down. Spoken wishes can be misunderstood, or not followed. Treatments may be given even if you do not want them. An advance directive may make it easier for your family to make difficult choices about your care.   Weight Management   Why it is important to manage your weight:  Being overweight increases your risk of health conditions such as heart disease, high blood pressure, type 2 diabetes, and certain types of cancer. It can also increase your risk for  osteoarthritis, sleep apnea, and other respiratory problems. Aim for a slow, steady weight loss. Even a small amount of weight loss can lower your risk of health problems.  How to lose weight safely:  A safe and healthy way to lose weight is to eat fewer calories and get regular exercise. You can lose up about 1 pound a week by decreasing the number of calories you eat by 500 calories each day.   Healthy meal plan for weight management:  A healthy meal plan includes a variety of foods, contains fewer calories, and helps you stay healthy. A healthy meal plan includes the following:  Eat whole-grain foods more often.  A healthy meal plan should contain fiber. Fiber is the part of grains, fruits, and vegetables that is not broken down by your body. Whole-grain foods are healthy and provide extra fiber in your diet. Some examples of whole-grain foods are whole-wheat breads and pastas, oatmeal, brown rice, and bulgur.  Eat a variety of vegetables every day.  Include dark, leafy greens such as spinach, kale, nani greens, and mustard greens. Eat yellow and orange vegetables such as carrots, sweet potatoes, and winter squash.   Eat a variety of fruits every day.  Choose fresh or canned fruit (canned in its own juice or light syrup) instead of juice. Fruit juice has very little or no fiber.  Eat low-fat dairy foods.  Drink fat-free (skim) milk or 1% milk. Eat fat-free yogurt and low-fat cottage cheese. Try low-fat cheeses such as mozzarella and other reduced-fat cheeses.  Choose meat and other protein foods that are low in fat.  Choose beans or other legumes such as split peas or lentils. Choose fish, skinless poultry (chicken or turkey), or lean cuts of red meat (beef or pork). Before you cook meat or poultry, cut off any visible fat.   Use less fat and oil.  Try baking foods instead of frying them. Add less fat, such as margarine, sour cream, regular salad dressing and mayonnaise to foods. Eat fewer high-fat foods. Some  examples of high-fat foods include french fries, doughnuts, ice cream, and cakes.  Eat fewer sweets.  Limit foods and drinks that are high in sugar. This includes candy, cookies, regular soda, and sweetened drinks.  Exercise:  Exercise at least 30 minutes per day on most days of the week. Some examples of exercise include walking, biking, dancing, and swimming. You can also fit in more physical activity by taking the stairs instead of the elevator or parking farther away from stores. Ask your healthcare provider about the best exercise plan for you.      © Copyright [employer] 2018 Information is for End User's use only and may not be sold, redistributed or otherwise used for commercial purposes. All illustrations and images included in CareNotes® are the copyrighted property of [employer] or [employer]    Medicare Preventive Visit Patient Instructions  Thank you for completing your Welcome to Medicare Visit or Medicare Annual Wellness Visit today. Your next wellness visit will be due in one year (5/15/2025).  The screening/preventive services that you may require over the next 5-10 years are detailed below. Some tests may not apply to you based off risk factors and/or age. Screening tests ordered at today's visit but not completed yet may show as past due. Also, please note that scanned in results may not display below.  Preventive Screenings:  Service Recommendations Previous Testing/Comments   Colorectal Cancer Screening  Colonoscopy    Fecal Occult Blood Test (FOBT)/Fecal Immunochemical Test (FIT)  Fecal DNA/Cologuard Test  Flexible Sigmoidoscopy Age: 45-75 years old   Colonoscopy: every 10 years (May be performed more frequently if at higher risk)  OR  FOBT/FIT: every 1 year  OR  Cologuard: every 3 years  OR  Sigmoidoscopy: every 5 years  Screening may be recommended earlier than age 45 if at higher risk for colorectal cancer. Also, an individualized decision between you and your healthcare  provider will decide whether screening between the ages of 76-85 would be appropriate. Colonoscopy: 12/05/2023  FOBT/FIT: Not on file  Cologuard: Not on file  Sigmoidoscopy: Not on file    Screening Current     Prostate Cancer Screening Individualized decision between patient and health care provider in men between ages of 55-69   Medicare will cover every 12 months beginning on the day after your 50th birthday PSA: 4.49 ng/mL     Screening Current     Hepatitis C Screening Once for adults born between 1945 and 1965  More frequently in patients at high risk for Hepatitis C Hep C Antibody: 04/30/2020    Screening Current   Diabetes Screening 1-2 times per year if you're at risk for diabetes or have pre-diabetes Fasting glucose: 98 mg/dL (11/6/2023)  A1C: 5.6 % (12/24/2021)  Screening Current   Cholesterol Screening Once every 5 years if you don't have a lipid disorder. May order more often based on risk factors. Lipid panel: 11/06/2023  Screening Not Indicated  History Lipid Disorder      Other Preventive Screenings Covered by Medicare:  Abdominal Aortic Aneurysm (AAA) Screening: covered once if your at risk. You're considered to be at risk if you have a family history of AAA or a male between the age of 65-75 who smoking at least 100 cigarettes in your lifetime.  Lung Cancer Screening: covers low dose CT scan once per year if you meet all of the following conditions: (1) Age 55-77; (2) No signs or symptoms of lung cancer; (3) Current smoker or have quit smoking within the last 15 years; (4) You have a tobacco smoking history of at least 20 pack years (packs per day x number of years you smoked); (5) You get a written order from a healthcare provider.  Glaucoma Screening: covered annually if you're considered high risk: (1) You have diabetes OR (2) Family history of glaucoma OR (3)  aged 50 and older OR (4)  American aged 65 and older  Osteoporosis Screening: covered every 2 years if you meet  one of the following conditions: (1) Have a vertebral abnormality; (2) On glucocorticoid therapy for more than 3 months; (3) Have primary hyperparathyroidism; (4) On osteoporosis medications and need to assess response to drug therapy.  HIV Screening: covered annually if you're between the age of 15-65. Also covered annually if you are younger than 15 and older than 65 with risk factors for HIV infection. For pregnant patients, it is covered up to 3 times per pregnancy.    Immunizations:  Immunization Recommendations   Influenza Vaccine Annual influenza vaccination during flu season is recommended for all persons aged >= 6 months who do not have contraindications   Pneumococcal Vaccine   * Pneumococcal conjugate vaccine = PCV13 (Prevnar 13), PCV15 (Vaxneuvance), PCV20 (Prevnar 20)  * Pneumococcal polysaccharide vaccine = PPSV23 (Pneumovax) Adults 19-63 yo with certain risk factors or if 65+ yo  If never received any pneumonia vaccine: recommend Prevnar 20 (PCV20)  Give PCV20 if previously received 1 dose of PCV13 or PPSV23   Hepatitis B Vaccine 3 dose series if at intermediate or high risk (ex: diabetes, end stage renal disease, liver disease)   Respiratory syncytial virus (RSV) Vaccine - COVERED BY MEDICARE PART D  * RSVPreF3 (Arexvy) CDC recommends that adults 60 years of age and older may receive a single dose of RSV vaccine using shared clinical decision-making (SCDM)   Tetanus (Td) Vaccine - COST NOT COVERED BY MEDICARE PART B Following completion of primary series, a booster dose should be given every 10 years to maintain immunity against tetanus. Td may also be given as tetanus wound prophylaxis.   Tdap Vaccine - COST NOT COVERED BY MEDICARE PART B Recommended at least once for all adults. For pregnant patients, recommended with each pregnancy.   Shingles Vaccine (Shingrix) - COST NOT COVERED BY MEDICARE PART B  2 shot series recommended in those 19 years and older who have or will have weakened immune  systems or those 50 years and older     Health Maintenance Due:      Topic Date Due   • Colorectal Cancer Screening  12/03/2030   • Hepatitis C Screening  Completed     Immunizations Due:      Topic Date Due   • IPV Vaccine (2 of 3 - Adult catch-up series) 07/29/2001   • COVID-19 Vaccine (7 - 2023-24 season) 12/26/2023     Advance Directives   What are advance directives?  Advance directives are legal documents that state your wishes and plans for medical care. These plans are made ahead of time in case you lose your ability to make decisions for yourself. Advance directives can apply to any medical decision, such as the treatments you want, and if you want to donate organs.   What are the types of advance directives?  There are many types of advance directives, and each state has rules about how to use them. You may choose a combination of any of the following:  Living will:  This is a written record of the treatment you want. You can also choose which treatments you do not want, which to limit, and which to stop at a certain time. This includes surgery, medicine, IV fluid, and tube feedings.   Durable power of  for healthcare (DPAHC):  This is a written record that states who you want to make healthcare choices for you when you are unable to make them for yourself. This person, called a proxy, is usually a family member or a friend. You may choose more than 1 proxy.  Do not resuscitate (DNR) order:  A DNR order is used in case your heart stops beating or you stop breathing. It is a request not to have certain forms of treatment, such as CPR. A DNR order may be included in other types of advance directives.  Medical directive:  This covers the care that you want if you are in a coma, near death, or unable to make decisions for yourself. You can list the treatments you want for each condition. Treatment may include pain medicine, surgery, blood transfusions, dialysis, IV or tube feedings, and a ventilator  (breathing machine).  Values history:  This document has questions about your views, beliefs, and how you feel and think about life. This information can help others choose the care that you would choose.  Why are advance directives important?  An advance directive helps you control your care. Although spoken wishes may be used, it is better to have your wishes written down. Spoken wishes can be misunderstood, or not followed. Treatments may be given even if you do not want them. An advance directive may make it easier for your family to make difficult choices about your care.   Weight Management   Why it is important to manage your weight:  Being overweight increases your risk of health conditions such as heart disease, high blood pressure, type 2 diabetes, and certain types of cancer. It can also increase your risk for osteoarthritis, sleep apnea, and other respiratory problems. Aim for a slow, steady weight loss. Even a small amount of weight loss can lower your risk of health problems.  How to lose weight safely:  A safe and healthy way to lose weight is to eat fewer calories and get regular exercise. You can lose up about 1 pound a week by decreasing the number of calories you eat by 500 calories each day.   Healthy meal plan for weight management:  A healthy meal plan includes a variety of foods, contains fewer calories, and helps you stay healthy. A healthy meal plan includes the following:  Eat whole-grain foods more often.  A healthy meal plan should contain fiber. Fiber is the part of grains, fruits, and vegetables that is not broken down by your body. Whole-grain foods are healthy and provide extra fiber in your diet. Some examples of whole-grain foods are whole-wheat breads and pastas, oatmeal, brown rice, and bulgur.  Eat a variety of vegetables every day.  Include dark, leafy greens such as spinach, kale, nani greens, and mustard greens. Eat yellow and orange vegetables such as carrots, sweet  potatoes, and winter squash.   Eat a variety of fruits every day.  Choose fresh or canned fruit (canned in its own juice or light syrup) instead of juice. Fruit juice has very little or no fiber.  Eat low-fat dairy foods.  Drink fat-free (skim) milk or 1% milk. Eat fat-free yogurt and low-fat cottage cheese. Try low-fat cheeses such as mozzarella and other reduced-fat cheeses.  Choose meat and other protein foods that are low in fat.  Choose beans or other legumes such as split peas or lentils. Choose fish, skinless poultry (chicken or turkey), or lean cuts of red meat (beef or pork). Before you cook meat or poultry, cut off any visible fat.   Use less fat and oil.  Try baking foods instead of frying them. Add less fat, such as margarine, sour cream, regular salad dressing and mayonnaise to foods. Eat fewer high-fat foods. Some examples of high-fat foods include french fries, doughnuts, ice cream, and cakes.  Eat fewer sweets.  Limit foods and drinks that are high in sugar. This includes candy, cookies, regular soda, and sweetened drinks.  Exercise:  Exercise at least 30 minutes per day on most days of the week. Some examples of exercise include walking, biking, dancing, and swimming. You can also fit in more physical activity by taking the stairs instead of the elevator or parking farther away from stores. Ask your healthcare provider about the best exercise plan for you.      © Copyright [employer] 2018 Information is for End User's use only and may not be sold, redistributed or otherwise used for commercial purposes. All illustrations and images included in CareNotes® are the copyrighted property of [employer] or [employer]

## 2024-05-14 NOTE — ASSESSMENT & PLAN NOTE
Reviewed the urology note and PSA Patient to continue every 6 months PSA and yearly followup Continue tamsulosin as that has resolved the urinary dribbling

## 2024-05-14 NOTE — PROGRESS NOTES
Assessment and Plan:     Problem List Items Addressed This Visit          Cardiovascular and Mediastinum    Essential hypertension     Blood pressure is controlled on lisinopril Continue that and followup in 6 months          Relevant Orders    Comprehensive metabolic panel    Lipid panel       Respiratory    HAROON (obstructive sleep apnea)     Patient to continue with CPAP machine and followup with sleep medicine            Digestive    Tubular adenoma of colon     Repeat colonoscopy in 2030            Genitourinary    BPH with urinary obstruction     Reviewed the urology note and PSA Patient to continue every 6 months PSA and yearly followup Continue tamsulosin as that has resolved the urinary dribbling            Urinary    Elevated prostate specific antigen (PSA)     Has improved MRI was reviewed and last urology note Will repeat PSA every 6 months and yearly exam by urology            Neurology/Sleep    History of transient ischemic attack (TIA)     Patient had TIA No followup with neurology since 2022 Patient and wife feel he is slightly forgetful and they would like to see neurology again - referral given Patient will need to keep BP < 130 so continue lisinopril  and will need to stay on lipitor with LDL goal <80 and also will have followup labs         Relevant Orders    Comprehensive metabolic panel    Lipid panel       Other    Overweight     Weight is stable continue to exercise and watch diet I will see him in 6months          Mixed hyperlipidemia     LDL goal is < 80 continue lipitor 11/2023 albs showed LDL was 56 Patient to have repeat labs He will see me in 6 months          Relevant Orders    Comprehensive metabolic panel    Lipid panel    Medicare annual wellness visit, subsequent - Primary     Patient has paperwork for living will and will do that Patient is up to date with screening Patient does need AAA screening Ultrasound and he will schedule Yearly flu shot recommended also         Pancreatic  mass     Awaiting pathology follow up GI          Other Visit Diagnoses       Screening for AAA (abdominal aortic aneurysm)        Relevant Orders    US abdominal aorta screening aaa             Preventive health issues were discussed with patient, and age appropriate screening tests were ordered as noted in patient's After Visit Summary.  Personalized health advice and appropriate referrals for health education or preventive services given if needed, as noted in patient's After Visit Summary.     History of Present Illness:     Patient presents for a Medicare Wellness Visit    Patient is here for medicare wellness and follow up hypertension history of TIA hyperlipidemia sleep apnea elevated PSA pancreatic mass and also his weight Patient wife is with him She feesl since his TIA he has slightl memory loss and is more impatient He has not had any followup since 2022 Patient is interested in seeing neurology Patient PSA has come down to 4.5 His urination is improved on the tamsulosin Patient is using CPAP Patient blood pressure is good His lipid goal is < 80 and his last labs 11/2023 show he is at goal Patient weight is stable he plays soccer tennis and pickleball Patient has no other concerns He is waiting for the results from the pancreatic biopsy        Patient Care Team:  Tanya Harper DO as PCP - General (Family Medicine)  Tanya Harper DO as PCP - PCP-[facility] MD Lucien Foote, RN as Nurse Navigator (Oncology)  Franky Veronica MD (Oncology)     Review of Systems:     Review of Systems   Constitutional:  Negative for fatigue, fever and unexpected weight change.   HENT:  Negative for congestion, sinus pain and trouble swallowing.    Eyes:  Negative for discharge and visual disturbance.   Respiratory:  Negative for cough, chest tightness, shortness of breath and wheezing.    Cardiovascular:  Negative for chest pain, palpitations and leg swelling.    Gastrointestinal:  Negative for abdominal pain, blood in stool, constipation, diarrhea, nausea and vomiting.   Genitourinary:  Negative for difficulty urinating, dysuria, frequency and hematuria.   Musculoskeletal:  Negative for arthralgias, gait problem and joint swelling.   Skin:  Negative for rash and wound.   Allergic/Immunologic: Negative for environmental allergies and food allergies.   Neurological:  Negative for dizziness, syncope, weakness, numbness and headaches.   Hematological:  Negative for adenopathy. Does not bruise/bleed easily.   Psychiatric/Behavioral:  Negative for confusion, decreased concentration and sleep disturbance. The patient is not nervous/anxious.         Problem List:     Patient Active Problem List   Diagnosis    Essential hypertension    Overweight    Elevated prostate specific antigen (PSA)    BPH with urinary obstruction    History of transient ischemic attack (TIA)    Mixed hyperlipidemia    Medicare annual wellness visit, subsequent    Tubular adenoma of colon    HAROON (obstructive sleep apnea)    Pancreatic mass      Past Medical and Surgical History:     Past Medical History:   Diagnosis Date    Hyperglycemia     Resolved 3/31/2016     Hypertension     Obstructive sleep apnea on CPAP     Sleep apnea     TIA (transient ischemic attack) 5/7/2024     Past Surgical History:   Procedure Laterality Date    CARDIAC LOOP RECORDER      CARDIAC SURGERY      COLONOSCOPY      Proctitis, otherwise nml. Dr. Moore. Resolved 12/3/2008       Family History:     Family History   Problem Relation Age of Onset    Dementia Mother     Stroke Father     Hypertension Father     No Known Problems Sister     No Known Problems Daughter     No Known Problems Daughter     No Known Problems Sister     No Known Problems Sister     No Known Problems Sister       Social History:     Social History     Socioeconomic History    Marital status: /Civil Union     Spouse name: None    Number of children:  None    Years of education: None    Highest education level: None   Occupational History    None   Tobacco Use    Smoking status: Never    Smokeless tobacco: Never   Vaping Use    Vaping status: Never Used   Substance and Sexual Activity    Alcohol use: Yes     Comment: Social     Drug use: Never    Sexual activity: Yes     Partners: Female     Birth control/protection: Post-menopausal     Comment: 2 kids 33 and 31   Other Topics Concern    None   Social History Narrative    None     Social Determinants of Health     Financial Resource Strain: Low Risk  (3/28/2023)    Overall Financial Resource Strain (CARDIA)     Difficulty of Paying Living Expenses: Not very hard   Food Insecurity: No Food Insecurity (5/14/2024)    Hunger Vital Sign     Worried About Running Out of Food in the Last Year: Never true     Ran Out of Food in the Last Year: Never true   Transportation Needs: No Transportation Needs (5/14/2024)    PRAPARE - Transportation     Lack of Transportation (Medical): No     Lack of Transportation (Non-Medical): No   Physical Activity: Not on file   Stress: Not on file   Social Connections: Not on file   Intimate Partner Violence: Not on file   Housing Stability: Low Risk  (5/14/2024)    Housing Stability Vital Sign     Unable to Pay for Housing in the Last Year: No     Number of Places Lived in the Last Year: 1     Unstable Housing in the Last Year: No      Medications and Allergies:     Current Outpatient Medications   Medication Sig Dispense Refill    aspirin (ECOTRIN LOW STRENGTH) 81 mg EC tablet Take 81 mg by mouth daily      atorvastatin (LIPITOR) 40 mg tablet TAKE ONE TABLET BY MOUTH ONCE DAILY 90 tablet 1    lisinopril (ZESTRIL) 5 mg tablet TAKE 1 TABLET BY MOUTH ONCE DAILY 90 tablet 1    multivitamin (THERAGRAN) TABS Take 1 tablet by mouth daily      tamsulosin (FLOMAX) 0.4 mg TAKE 1 CAPSULE BY MOUTH ONCE DAILY WITH DINNER 90 capsule 1     No current facility-administered medications for this visit.      No Known Allergies   Immunizations:     Immunization History   Administered Date(s) Administered    COVID-19 PFIZER VACCINE 0.3 ML IM 03/27/2021, 04/17/2021, 11/29/2021, 10/31/2023    COVID-19 Pfizer vac (Bruno-sucrose, gray cap) 12 yr+ IM 04/26/2022, 10/18/2022    DT (pediatric) 11/27/2007    Hepatitis A 12/23/1996    Hepatitis B 04/24/1996    INFLUENZA 11/04/2021, 09/13/2023, 10/19/2023, 10/29/2023    IPV 07/01/2001    Influenza Split High Dose Preservative Free IM 11/22/2022    Influenza, injectable, quadrivalent, preservative free 0.5 mL 11/08/2019, 09/22/2020    Pneumococcal Conjugate Vaccine 20-valent (Pcv20), Polysace 12/30/2022    Td (adult), adsorbed 11/03/2007    Tdap 05/24/2019    Typhoid, Unspecified 05/01/1998, 10/25/2016    Zoster Vaccine Recombinant 10/15/2020, 12/24/2020      Health Maintenance:         Topic Date Due    Colorectal Cancer Screening  12/03/2030    Hepatitis C Screening  Completed         Topic Date Due    IPV Vaccine (2 of 3 - Adult catch-up series) 07/29/2001    COVID-19 Vaccine (7 - 2023-24 season) 12/26/2023      Medicare Screening Tests and Risk Assessments:         Health Risk Assessment:   Patient rates overall health as very good. Patient feels that their physical health rating is same. Patient is very satisfied with their life. Eyesight was rated as same. Hearing was rated as same. Patient feels that their emotional and mental health rating is same. Patients states they are never, rarely angry. Patient states they are never, rarely unusually tired/fatigued. Pain experienced in the last 7 days has been none. Patient states that he has experienced no weight loss or gain in last 6 months.     Depression Screening:   PHQ-2 Score: 0      Fall Risk Screening:   In the past year, patient has experienced: no history of falling in past year      Home Safety:  Patient does not have trouble with stairs inside or outside of their home. Patient has working smoke alarms and has no  working carbon monoxide detector. Home safety hazards include: none.     Nutrition:   Current diet is Regular.     Medications:   Patient is not currently taking any over-the-counter supplements. Patient is able to manage medications.     Activities of Daily Living (ADLs)/Instrumental Activities of Daily Living (IADLs):   Walk and transfer into and out of bed and chair?: Yes  Dress and groom yourself?: Yes    Bathe or shower yourself?: Yes    Feed yourself? Yes  Do your laundry/housekeeping?: Yes  Manage your money, pay your bills and track your expenses?: Yes  Make your own meals?: Yes    Do your own shopping?: Yes    Previous Hospitalizations:   Any hospitalizations or ED visits within the last 12 months?: No      Advance Care Planning:   Living will: No    Durable POA for healthcare: Yes    Advanced directive: Yes    ACP document given: Yes      Cognitive Screening:   Provider or family/friend/caregiver concerned regarding cognition?: No    PREVENTIVE SCREENINGS      Cardiovascular Screening:    General: Screening Not Indicated and History Lipid Disorder      Diabetes Screening:     General: Screening Current      Colorectal Cancer Screening:     General: Screening Current      Prostate Cancer Screening:    General: Screening Current      Osteoporosis Screening:    General: Screening Not Indicated      Abdominal Aortic Aneurysm (AAA) Screening:    Risk factors include: age between 65-76 yo        General: Screening Not Indicated    Due for: Screening AAA Ultrasound      Lung Cancer Screening:     General: Screening Not Indicated      Hepatitis C Screening:    General: Screening Current    Screening, Brief Intervention, and Referral to Treatment (SBIRT)    Screening  Typical number of drinks in a day: 1  Typical number of drinks in a week: 3  Interpretation: Low risk drinking behavior.    AUDIT-C Screenin) How often did you have a drink containing alcohol in the past year? 2 to 3 times a week  2) How many  "drinks did you have on a typical day when you were drinking in the past year? 1 to 2  3) How often did you have 6 or more drinks on one occasion in the past year? never    AUDIT-C Score: 3  Interpretation: Score 0-3 (male): Negative screen for alcohol misuse    Single Item Drug Screening:  How often have you used an illegal drug (including marijuana) or a prescription medication for non-medical reasons in the past year? never    Single Item Drug Screen Score: 0  Interpretation: Negative screen for possible drug use disorder    No results found.     Physical Exam:     /72   Ht 5' 7\" (1.702 m)   Wt 80.3 kg (177 lb)   BMI 27.72 kg/m²     Physical Exam  Vitals and nursing note reviewed.   Constitutional:       Appearance: Normal appearance. He is well-developed.   HENT:      Head: Normocephalic.      Right Ear: Tympanic membrane and external ear normal.      Left Ear: Tympanic membrane and external ear normal.      Nose: Nose normal.      Mouth/Throat:      Pharynx: No oropharyngeal exudate.   Eyes:      General:         Right eye: No discharge.         Left eye: No discharge.      Extraocular Movements: Extraocular movements intact.      Conjunctiva/sclera: Conjunctivae normal.      Pupils: Pupils are equal, round, and reactive to light.   Neck:      Thyroid: No thyromegaly.   Cardiovascular:      Rate and Rhythm: Normal rate and regular rhythm.      Heart sounds: Normal heart sounds.   Pulmonary:      Effort: Pulmonary effort is normal.      Breath sounds: Normal breath sounds.   Abdominal:      General: Bowel sounds are normal.      Palpations: Abdomen is soft. There is no mass.      Tenderness: There is no abdominal tenderness. There is no rebound.   Musculoskeletal:         General: Normal range of motion.      Cervical back: Normal range of motion and neck supple.   Lymphadenopathy:      Cervical: No cervical adenopathy.   Skin:     Coloration: Skin is not pale.      Findings: No erythema or rash. "   Neurological:      General: No focal deficit present.      Mental Status: He is alert.      Cranial Nerves: No cranial nerve deficit.   Psychiatric:         Mood and Affect: Mood normal.         Behavior: Behavior normal.         Thought Content: Thought content normal.         Judgment: Judgment normal.          Tanya Harper DO

## 2024-05-14 NOTE — ASSESSMENT & PLAN NOTE
Has improved MRI was reviewed and last urology note Will repeat PSA every 6 months and yearly exam by urology

## 2024-05-14 NOTE — ASSESSMENT & PLAN NOTE
Patient has paperwork for living will and will do that Patient is up to date with screening Patient does need AAA screening Ultrasound and he will schedule Yearly flu shot recommended also

## 2024-05-14 NOTE — ASSESSMENT & PLAN NOTE
LDL goal is < 80 continue lipitor 11/2023 albs showed LDL was 56 Patient to have repeat labs He will see me in 6 months

## 2024-05-15 ENCOUNTER — TELEPHONE (OUTPATIENT)
Age: 69
End: 2024-05-15

## 2024-05-15 NOTE — TELEPHONE ENCOUNTER
Pt was referred yesterday to a neuro office but the doctor no longer works there, pt is asking for an alternative ref or recommendation . Pt is requesting a call back .

## 2024-05-16 ENCOUNTER — PATIENT OUTREACH (OUTPATIENT)
Dept: HEMATOLOGY ONCOLOGY | Facility: CLINIC | Age: 69
End: 2024-05-16

## 2024-05-16 NOTE — PROGRESS NOTES
TC to patient to schedule follow up to discuss next steps now that he has had biopsy confirming non-malignant. LVM with my contact information

## 2024-05-28 ENCOUNTER — TELEPHONE (OUTPATIENT)
Dept: GASTROENTEROLOGY | Facility: CLINIC | Age: 69
End: 2024-05-28

## 2024-05-28 DIAGNOSIS — K86.2 PANCREATIC CYST: Primary | ICD-10-CM

## 2024-06-06 ENCOUNTER — HOSPITAL ENCOUNTER (OUTPATIENT)
Dept: ULTRASOUND IMAGING | Facility: MEDICAL CENTER | Age: 69
Discharge: HOME | End: 2024-06-06
Payer: MEDICARE

## 2024-06-06 DIAGNOSIS — Z13.6 SCREENING FOR AAA (ABDOMINAL AORTIC ANEURYSM): ICD-10-CM

## 2024-06-06 PROCEDURE — 76706 US ABDL AORTA SCREEN AAA: CPT

## 2024-06-07 ENCOUNTER — OFFICE VISIT (OUTPATIENT)
Dept: SURGICAL ONCOLOGY | Facility: CLINIC | Age: 69
End: 2024-06-07
Payer: MEDICARE

## 2024-06-07 VITALS
WEIGHT: 177.8 LBS | BODY MASS INDEX: 27.91 KG/M2 | OXYGEN SATURATION: 97 % | DIASTOLIC BLOOD PRESSURE: 64 MMHG | SYSTOLIC BLOOD PRESSURE: 128 MMHG | TEMPERATURE: 97.3 F | HEIGHT: 67 IN | HEART RATE: 50 BPM

## 2024-06-07 DIAGNOSIS — K86.89 PANCREATIC MASS: Primary | ICD-10-CM

## 2024-06-07 PROCEDURE — 99213 OFFICE O/P EST LOW 20 MIN: CPT | Performed by: SURGERY

## 2024-06-07 NOTE — PROGRESS NOTES
Surgical Oncology Follow Up       [address]  [facility] [place]  [address]  [facility] [id number]    Yunier Pichardo  1955  [id number]  [address]  [facility] [place]  [address] [place] [address]    Chief Complaint   Patient presents with    Follow-up       Assessment/Plan:    No problem-specific Assessment & Plan notes found for this encounter.       Diagnoses and all orders for this visit:    Pancreatic mass      Advance Care Planning/Advance Directives:  Discussed disease status, cancer treatment plans and/or cancer treatment goals with the patient.     Oncology History    No history exists.       History of Present Illness: 69-year-old man with pancreatic cysts here for follow-up status post EUS and biopsy.  -Interval History: No complaints to report.    Review of Systems:  Review of Systems   Constitutional: Negative.    HENT: Negative.     Eyes: Negative.    Respiratory: Negative.     Cardiovascular: Negative.    Gastrointestinal: Negative.    Endocrine: Negative.    Genitourinary: Negative.    Musculoskeletal: Negative.    Skin: Negative.    Allergic/Immunologic: Negative.    Neurological: Negative.    Hematological: Negative.    Psychiatric/Behavioral: Negative.     All other systems reviewed and are negative.      Patient Active Problem List   Diagnosis    Essential hypertension    Overweight    Elevated prostate specific antigen (PSA)    BPH with urinary obstruction    History of transient ischemic attack (TIA)    Mixed hyperlipidemia    Medicare annual wellness visit, subsequent    Tubular adenoma of colon    HAROON (obstructive sleep apnea)    Pancreatic mass     Past Medical History:   Diagnosis Date    Hyperglycemia     Resolved 3/31/2016     Hypertension     Obstructive sleep apnea on CPAP     Sleep apnea     TIA (transient ischemic attack) 5/7/2024     Past Surgical History:   Procedure Laterality Date     CARDIAC LOOP RECORDER      CARDIAC SURGERY      COLONOSCOPY      Proctitis, otherwise nml. Dr. Moore. Resolved 12/3/2008      Family History   Problem Relation Age of Onset    Dementia Mother     Stroke Father     Hypertension Father     No Known Problems Sister     No Known Problems Daughter     No Known Problems Daughter     No Known Problems Sister     No Known Problems Sister     No Known Problems Sister      Social History     Socioeconomic History    Marital status: /Civil Union     Spouse name: Not on file    Number of children: Not on file    Years of education: Not on file    Highest education level: Not on file   Occupational History    Not on file   Tobacco Use    Smoking status: Never    Smokeless tobacco: Never   Vaping Use    Vaping status: Never Used   Substance and Sexual Activity    Alcohol use: Yes     Comment: Social     Drug use: Never    Sexual activity: Yes     Partners: Female     Birth control/protection: Post-menopausal     Comment: 2 kids 33 and 31   Other Topics Concern    Not on file   Social History Narrative    Not on file     Social Determinants of Health     Financial Resource Strain: Low Risk  (3/28/2023)    Overall Financial Resource Strain (CARDIA)     Difficulty of Paying Living Expenses: Not very hard   Food Insecurity: No Food Insecurity (5/14/2024)    Hunger Vital Sign     Worried About Running Out of Food in the Last Year: Never true     Ran Out of Food in the Last Year: Never true   Transportation Needs: No Transportation Needs (5/14/2024)    PRAPARE - Transportation     Lack of Transportation (Medical): No     Lack of Transportation (Non-Medical): No   Physical Activity: Not on file   Stress: Not on file   Social Connections: Not on file   Intimate Partner Violence: Not on file   Housing Stability: Unknown (5/14/2024)    Housing Stability Vital Sign     Unable to Pay for Housing in the Last Year: No     Number of Times Moved in the Last Year: Not on file      Homeless in the Last Year: Not on file       Current Outpatient Medications:     aspirin (ECOTRIN LOW STRENGTH) 81 mg EC tablet, Take 81 mg by mouth daily, Disp: , Rfl:     atorvastatin (LIPITOR) 40 mg tablet, TAKE ONE TABLET BY MOUTH ONCE DAILY, Disp: 90 tablet, Rfl: 1    lisinopril (ZESTRIL) 5 mg tablet, TAKE 1 TABLET BY MOUTH ONCE DAILY, Disp: 90 tablet, Rfl: 1    multivitamin (THERAGRAN) TABS, Take 1 tablet by mouth daily, Disp: , Rfl:     tamsulosin (FLOMAX) 0.4 mg, TAKE 1 CAPSULE BY MOUTH ONCE DAILY WITH DINNER, Disp: 90 capsule, Rfl: 1  No Known Allergies  Vitals:    06/07/24 0932   BP: 128/64   Pulse: (!) 50   Temp: (!) 97.3 °F (36.3 °C)   SpO2: 97%       Physical Exam  Vitals reviewed.   Constitutional:       Appearance: Normal appearance.   HENT:      Head: Normocephalic and atraumatic.      Right Ear: External ear normal.      Left Ear: External ear normal.      Nose: Nose normal.      Mouth/Throat:      Mouth: Mucous membranes are moist.   Eyes:      Extraocular Movements: Extraocular movements intact.      Pupils: Pupils are equal, round, and reactive to light.   Cardiovascular:      Rate and Rhythm: Normal rate and regular rhythm.      Heart sounds: Normal heart sounds.   Pulmonary:      Effort: Pulmonary effort is normal.      Breath sounds: Normal breath sounds.   Abdominal:      General: Abdomen is flat.      Palpations: Abdomen is soft.   Musculoskeletal:         General: Normal range of motion.      Cervical back: Normal range of motion and neck supple.   Skin:     General: Skin is warm and dry.   Neurological:      General: No focal deficit present.      Mental Status: He is alert and oriented to person, place, and time.   Psychiatric:         Mood and Affect: Mood normal.         Behavior: Behavior normal.         Thought Content: Thought content normal.         Judgment: Judgment normal.           Results:  Labs:  Pancreatin from EUS and biopsy of cyst showing benign cyst.  First sample with a  CEA of 189, amylase of 52 379, and glucose of 10.  Second sample CEA of 114, amylase of 40 644, and glucose 7.4.    Imaging    MRI OF THE ABDOMEN WITH AND WITHOUT CONTRAST WITH MRCP     INDICATION: 68 years / Male. R68.89: Other general symptoms and signs  K86.2: Cyst of pancreas.     COMPARISON: No prior MR abdomen available for comparison. Correlation with CT abdomen pelvis December 30, 2023     TECHNIQUE: Multiplanar/multisequence MRI of the abdomen with 3D MRCP was performed before and after administration of contrast.     IV Contrast: 8 mL of Gadobutrol injection (SINGLE-DOSE)     FINDINGS:     LOWER CHEST: Unremarkable.     LIVER:  Normal in size and configuration.  No suspicious mass.  Patent hepatic and portal veins.     BILE DUCTS: No intrahepatic or extrahepatic bile duct dilation. Common bile duct is normal in caliber. No choledocholithiasis, biliary stricture or suspicious mass.     GALLBLADDER: Normal     PANCREAS: Two adjacent pancreatic neck cysts measure 2.4 cm (series 11, image 34), and 1.7 cm (series 11, image 33). The cysts communicates with sidebranches of the nondilated main pancreatic duct (series 11, image 37). T2 hypointense, T1 hyperintense   tissue measuring 0.5 cm in thickness courses between the cysts, and is intervening pancreatic parenchyma and not a septation given its T1 signal and attenuation on the prior CT  (series 8, image 15; series 13, image 46 and series 3, image 40 on prior   CT). There are few thin septations within the both cysts, measuring up to 0.1 cm in thickness (series 7, image 15). There are no solid components of the cysts.     ADRENAL GLANDS: 1.1 cm left adrenal cyst (series 9, image 16). No right adrenal lesion.     SPLEEN: Normal.     KIDNEYS/PROXIMAL URETERS: No hydroureteronephrosis. No suspicious renal mass.  Few subcentimeter bilateral renal cysts measure up to 0.8 cm on the right.     BOWEL: No dilated loops of bowel.     PERITONEUM/RETROPERITONEUM: No  ascites.     LYMPH NODES: No abdominal lymphadenopathy.     VESSELS: No aneurysm.     ABDOMINAL WALL: Unremarkable     BONES: No suspicious osseous lesion.     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 For simple cyst(s) 2 cm or greater recommend gastroenterology and/or surgical oncology consult. EUS is   likely now warranted. rrPreferred imaging modality: abdomen MRI and MRCP with and without IV contrast, or triple phase abdomen CT with IV contrast, or abdomen MRI and MRCP without IV contrast.           The study was marked in EPIC for significant notification.              Workstation performed: [id number]        Imaging    MRI abdomen w wo contrast and mrcp (Order: [id number]) - 2/8/2024  Result History    MRI abdomen w wo contrast and mrcp (Order #[id number]) on 2/13/2024 - Order Result History Report  No results found.  I reviewed the above laboratory and imaging data.    Discussion/Summary: 69-year-old man, pancreatic cyst, benign on EUS.  Plan on follow-up in 9 months with MRI/MRCP for continued surveillance.

## 2024-06-13 PROBLEM — Z00.00 MEDICARE ANNUAL WELLNESS VISIT, SUBSEQUENT: Status: RESOLVED | Noted: 2023-03-29 | Resolved: 2024-06-13

## 2024-07-31 ENCOUNTER — OFFICE VISIT (OUTPATIENT)
Dept: FAMILY MEDICINE CLINIC | Facility: CLINIC | Age: 69
End: 2024-07-31
Payer: MEDICARE

## 2024-07-31 VITALS
HEIGHT: 67 IN | RESPIRATION RATE: 16 BRPM | HEART RATE: 61 BPM | SYSTOLIC BLOOD PRESSURE: 118 MMHG | OXYGEN SATURATION: 97 % | DIASTOLIC BLOOD PRESSURE: 60 MMHG | TEMPERATURE: 96.9 F | WEIGHT: 178 LBS | BODY MASS INDEX: 27.94 KG/M2

## 2024-07-31 DIAGNOSIS — M25.532 LEFT WRIST PAIN: ICD-10-CM

## 2024-07-31 DIAGNOSIS — I10 ESSENTIAL HYPERTENSION: ICD-10-CM

## 2024-07-31 DIAGNOSIS — M65.4 TENDINITIS, DE QUERVAIN'S: Primary | ICD-10-CM

## 2024-07-31 DIAGNOSIS — E66.3 OVERWEIGHT (BMI 25.0-29.9): ICD-10-CM

## 2024-07-31 PROCEDURE — G2211 COMPLEX E/M VISIT ADD ON: HCPCS | Performed by: FAMILY MEDICINE

## 2024-07-31 PROCEDURE — 99214 OFFICE O/P EST MOD 30 MIN: CPT | Performed by: FAMILY MEDICINE

## 2024-07-31 NOTE — PROGRESS NOTES
"Ambulatory Visit  Name: Yunier Pichardo      : 1955      MRN: [id number]  Encounter Provider: Nabila Terry DO  Encounter Date: 2024   Encounter department: [facility] PRIMARY CARE  Chief Complaint   Patient presents with    Wrist Pain     Pt is here for left wrist pain times 3 weeks      Patient Instructions   Rest left wrist and may use advil prn pain and rec wrist splint. Consult orthopedics. Take BP med as directed - stable. Lose weight as directed to get Bmi lower than 25.     Assessment & Plan   1. Tendinitis, de Quervain's  Comments:  splint the left wrist  Orders:  -     Ambulatory Referral to Orthopedic Surgery; Future  2. Left wrist pain  Comments:  rest and use left wrist splint  Orders:  -     Ambulatory Referral to Orthopedic Surgery; Future  3. Essential hypertension  Comments:  stable  4. Overweight (BMI 25.0-29.9)  Comments:  losse weight to get BMI lower than 25    [unfilled]  History of Present Illness     Wrist Pain (Pt is here for left wrist pain times 3 weeks ) Retired and no trauma to hand and no fall. Patient with pain along left radial area of left wrist.     Wrist Pain         Review of Systems   Constitutional: Negative.    HENT: Negative.     Eyes: Negative.    Respiratory: Negative.     Cardiovascular: Negative.    Gastrointestinal: Negative.    Endocrine: Negative.    Genitourinary: Negative.    Musculoskeletal:         Left radial wrist pain   Skin: Negative.    Allergic/Immunologic: Negative.    Neurological: Negative.    Hematological: Negative.    Psychiatric/Behavioral: Negative.         Objective     /60   Pulse 61   Temp (!) 96.9 °F (36.1 °C) (Temporal)   Resp 16   Ht 5' 7\" (1.702 m)   Wt 80.7 kg (178 lb)   SpO2 97%   BMI 27.88 kg/m²     Physical Exam  Constitutional:       Appearance: He is well-developed.   HENT:      Head: Normocephalic and atraumatic.      Right Ear: External ear normal.      Left Ear: External ear normal.      Nose: " Nose normal.   Eyes:      Conjunctiva/sclera: Conjunctivae normal.      Pupils: Pupils are equal, round, and reactive to light.   Cardiovascular:      Rate and Rhythm: Normal rate and regular rhythm.      Pulses: Normal pulses.      Heart sounds: Normal heart sounds.   Pulmonary:      Effort: Pulmonary effort is normal.      Breath sounds: Normal breath sounds.   Musculoskeletal:         General: Normal range of motion.      Cervical back: Normal range of motion and neck supple.      Comments: De Quervain's tenosynovitis   Skin:     General: Skin is warm and dry.      Capillary Refill: Capillary refill takes less than 2 seconds.   Neurological:      General: No focal deficit present.      Mental Status: He is alert and oriented to person, place, and time. Mental status is at baseline.      Deep Tendon Reflexes: Reflexes are normal and symmetric.   Psychiatric:         Mood and Affect: Mood normal.         Behavior: Behavior normal.         Thought Content: Thought content normal.         Judgment: Judgment normal.       Administrative Statements   I have spent a total time of 30 minutes in caring for this patient on the day of the visit/encounter including Diagnostic results, Prognosis, Risks and benefits of tx options, Instructions for management, Patient and family education, Importance of tx compliance, Risk factor reductions, Impressions, Counseling / Coordination of care, Documenting in the medical record, Reviewing / ordering tests, medicine, procedures  , and Obtaining or reviewing history  .

## 2024-07-31 NOTE — PATIENT INSTRUCTIONS
Rest left wrist and may use advil prn pain and rec wrist splint. Consult orthopedics. Take BP med as directed - stable. Lose weight as directed to get Bmi lower than 25.

## 2024-08-05 ENCOUNTER — TELEPHONE (OUTPATIENT)
Age: 69
End: 2024-08-05

## 2024-08-05 DIAGNOSIS — I10 ESSENTIAL HYPERTENSION: ICD-10-CM

## 2024-08-05 RX ORDER — ATORVASTATIN CALCIUM 40 MG/1
40 TABLET, FILM COATED ORAL DAILY
Qty: 90 TABLET | Refills: 0 | Status: SHIPPED | OUTPATIENT
Start: 2024-08-05

## 2024-08-05 NOTE — TELEPHONE ENCOUNTER
I called and spoke with the patient and made him aware he verbalized understanding and agreement.

## 2024-08-05 NOTE — TELEPHONE ENCOUNTER
Patient called the office to check with the provider if its okay to take motrin or tylenol for his wrist pain. As per patient he was told a few years ago that he can not take anti-inflammatory medication. Please review and advise

## 2024-08-06 ENCOUNTER — TELEPHONE (OUTPATIENT)
Dept: OBGYN CLINIC | Facility: HOSPITAL | Age: 69
End: 2024-08-06

## 2024-08-06 NOTE — TELEPHONE ENCOUNTER
Patient is being referred to a orthopedics. Please schedule accordingly.    [facility]  [phone]

## 2024-08-20 ENCOUNTER — TELEPHONE (OUTPATIENT)
Age: 69
End: 2024-08-20

## 2024-08-20 NOTE — TELEPHONE ENCOUNTER
Patient called in requesting to speak with someone in the [place] office in regards to his upcoming appointment with Dr. Fernandez. He has several concern in regards to the equipment and other details. Please call patient back at , .  [phone].    Thanks

## 2024-08-21 NOTE — TELEPHONE ENCOUNTER
Patient called back and is requesting a call back he is very frustrated that he tried calling a couple of times and did not reach any one.I attempted to transfer but was unsuccessful .

## 2024-08-22 NOTE — TELEPHONE ENCOUNTER
Two attempts have been made to reach patient on 8/22/24 @8:28 AM and 8:29 AM . Voicemail was left for [place] appt tomorrow with Dr. Fernandez at 10:30 AM or 2:30 PM

## 2024-09-02 DIAGNOSIS — I10 ESSENTIAL HYPERTENSION: ICD-10-CM

## 2024-09-03 RX ORDER — LISINOPRIL 5 MG/1
5 TABLET ORAL DAILY
Qty: 90 TABLET | Refills: 1 | Status: SHIPPED | OUTPATIENT
Start: 2024-09-03

## 2024-09-06 ENCOUNTER — TELEPHONE (OUTPATIENT)
Dept: SLEEP CENTER | Facility: CLINIC | Age: 69
End: 2024-09-06

## 2024-09-06 ENCOUNTER — OFFICE VISIT (OUTPATIENT)
Dept: SLEEP CENTER | Facility: CLINIC | Age: 69
End: 2024-09-06
Payer: MEDICARE

## 2024-09-06 VITALS
HEIGHT: 67 IN | WEIGHT: 178 LBS | SYSTOLIC BLOOD PRESSURE: 123 MMHG | DIASTOLIC BLOOD PRESSURE: 70 MMHG | BODY MASS INDEX: 27.94 KG/M2 | HEART RATE: 83 BPM

## 2024-09-06 DIAGNOSIS — R40.0 DAYTIME SLEEPINESS: ICD-10-CM

## 2024-09-06 DIAGNOSIS — E66.3 OVERWEIGHT: ICD-10-CM

## 2024-09-06 DIAGNOSIS — G47.33 OSA (OBSTRUCTIVE SLEEP APNEA): Primary | ICD-10-CM

## 2024-09-06 DIAGNOSIS — I10 ESSENTIAL HYPERTENSION: ICD-10-CM

## 2024-09-06 PROCEDURE — G2211 COMPLEX E/M VISIT ADD ON: HCPCS | Performed by: INTERNAL MEDICINE

## 2024-09-06 PROCEDURE — 99214 OFFICE O/P EST MOD 30 MIN: CPT | Performed by: INTERNAL MEDICINE

## 2024-09-06 NOTE — PROGRESS NOTES
Follow-Up Note - Sleep Center   Yunier Pichardo  69 y.o. male  :1955  MRN:[id number]  DOS:2024    CC: I saw this patient for follow-up in clinic today for Sleep disordered breathing, Coexisting Sleep and Medical Problems.  Patient received ot a refurbished dream Station Version 1 machine from [employer] over a year ago.. Interval changes: None reported.      Results of home sleep testing in 2019 demonstrated JARROD (respiratory event index of) 24 /hour.  Minimum oxygen saturation was 74%.  3.1% of the study was spent with saturations less than 90%.  The snore index was 20.4%.    Insurance declined a titration study and auto titrating Pap was initiated.          PFSH, Problem List, Medications & Allergies were reviewed in EMR.   He  has a past medical history of Hyperglycemia, Hypertension, Obstructive sleep apnea on CPAP, Sleep apnea, and TIA (transient ischemic attack) (2024).    He has a current medication list which includes the following prescription(s): aspirin, atorvastatin, lisinopril, multivitamin, and tamsulosin.    PHYSIOLOGICAL DATA REVIEW : Using PAP > 4 hours/night 87%. Estimated JARROD 7.5/hour with pressure of 19cm H2O@90th/95th percentile; use is limited by  .  INTERPRETATION: Compliance is excellent; Pressure setting is:in acceptable range; ;   SUBJECTIVE: With respect to use of PAP, Yunier  is experiencing no adverse effects:.He derives benefit..  Feels satisfied with sleep and daytime function.   Sleep Routine: Yunier reports getting 7 hrs sleep; he has no difficulty initiating or maintaining sleep . He arises spontaneously and feels refreshed.Yunier denies excessive daytime sleepiness,.  He rated himself at Total score: 2 /24 on the [place] Sleepiness Scale.   Other issues: None reported.     Habits: Reports that he has never smoked. He has never used smokeless tobacco.,  Reports current alcohol use.,  Reports no history of drug use., Caffeine use:limited until  ; Exercise  "routine: regular.      ROS: Significant for weight has been stable.  Review of systems was otherwise negative..    EXAM: /70 (BP Location: Left arm, Patient Position: Sitting, Cuff Size: Large)   Pulse 83   Ht 5' 7\" (1.702 m)   Wt 80.7 kg (178 lb)   BMI 27.88 kg/m²     Wt Readings from Last 3 Encounters:   09/06/24 80.7 kg (178 lb)   07/31/24 80.7 kg (178 lb)   06/07/24 80.6 kg (177 lb 12.8 oz)      Patient is well groomed; well appearing.   CNS: Alert, orientated, speech clear & coherent  Psych: cooperative and in no distress. Mental state: Appears normal.  H&N: EOMI; NC/AT: No facial pressure marks, no rashes.    Skin/Extrem: col & hydration normal; no edema  Resp: Respiratory effort is normal  Physical findings otherwise essentially unchanged from previous.    IMPRESSION: Problem List Items & Comorbidities Addressed this Visit    1. HAROON (obstructive sleep apnea)  PAP DME Pressure Change    PAP DME Resupply/Reorder      2. Daytime sleepiness        3. Essential hypertension        4. Overweight            PLAN:  I reviewed results of prior studies and physiologic data with the patient.   I discussed treatment options with risks and benefits.  Treatment with  PAP is medically necessary and Yunier is agreable to continue use.   Care of equipment, methods to improve comfort using PAP and importance of compliance with therapy were discussed.  Pressure setting: change 18-20 cmH2O using a fullface mask.    Rx provided to replace supplies and Care coordinated with DME provider.   The patient's current unit has now reached its 5 yr reasonable, useful life and will need to be replaced shortly.  However he declined for now.  He will call few months prior to his next visit for a prescription.  Discussed strategies for weight reduction.    Follow-up is advised in 1 year or sooner if needed to monitor progress, compliance and to adjust therapy.     Thank you for allowing me to participate in the care of this " "patient.    Sincerely,     Authenticated electronically on 09/06/24   Board Certified Specialist     Portions of the record may have been created with voice recognition software. Occasional wrong word or \"sound a like\" substitutions may have occurred due to the inherent limitations of voice recognition software. There may also be notations and random deletions of words or characters from malfunctioning software. Read the chart carefully and recognize, using context, where substitutions/deletions have occurred.    "

## 2024-09-17 ENCOUNTER — TELEPHONE (OUTPATIENT)
Dept: SLEEP CENTER | Facility: CLINIC | Age: 69
End: 2024-09-17

## 2024-09-17 LAB

## 2024-09-23 DIAGNOSIS — N13.8 BPH WITH URINARY OBSTRUCTION: ICD-10-CM

## 2024-09-23 DIAGNOSIS — N40.1 BPH WITH URINARY OBSTRUCTION: ICD-10-CM

## 2024-09-23 RX ORDER — TAMSULOSIN HYDROCHLORIDE 0.4 MG/1
CAPSULE ORAL
Qty: 90 CAPSULE | Refills: 1 | Status: SHIPPED | OUTPATIENT
Start: 2024-09-23

## 2024-09-24 ENCOUNTER — OFFICE VISIT (OUTPATIENT)
Dept: OBGYN CLINIC | Facility: MEDICAL CENTER | Age: 69
End: 2024-09-24
Payer: MEDICARE

## 2024-09-24 ENCOUNTER — APPOINTMENT (OUTPATIENT)
Dept: RADIOLOGY | Facility: MEDICAL CENTER | Age: 69
End: 2024-09-24
Payer: MEDICARE

## 2024-09-24 VITALS
BODY MASS INDEX: 27.94 KG/M2 | DIASTOLIC BLOOD PRESSURE: 64 MMHG | HEART RATE: 56 BPM | SYSTOLIC BLOOD PRESSURE: 108 MMHG | HEIGHT: 67 IN | WEIGHT: 178 LBS

## 2024-09-24 DIAGNOSIS — M65.4 TENDINITIS, DE QUERVAIN'S: ICD-10-CM

## 2024-09-24 DIAGNOSIS — M25.532 LEFT WRIST PAIN: ICD-10-CM

## 2024-09-24 DIAGNOSIS — M18.12 PRIMARY OSTEOARTHRITIS OF FIRST CARPOMETACARPAL JOINT OF LEFT HAND: Primary | ICD-10-CM

## 2024-09-24 PROCEDURE — 73110 X-RAY EXAM OF WRIST: CPT

## 2024-09-24 PROCEDURE — 99213 OFFICE O/P EST LOW 20 MIN: CPT | Performed by: EMERGENCY MEDICINE

## 2024-09-24 NOTE — PROGRESS NOTES
Assessment/Plan:    Diagnoses and all orders for this visit:    Primary osteoarthritis of first carpometacarpal joint of left hand  -     Cock Up Wrist Splint    Tendinitis, de Quervain's  Comments:  splint the left wrist  Orders:  -     Ambulatory Referral to Orthopedic Surgery  -     XR wrist 3+ vw left; Future  -     Cock Up Wrist Splint    Left wrist pain  Comments:  rest and use left wrist splint  Orders:  -     Ambulatory Referral to Orthopedic Surgery  -     XR wrist 3+ vw left; Future  -     Cock Up Wrist Splint    Patient's wrist pain seems to be stemming from de Quervain's tenosynovitis.  X-rays of the wrist were obtained today showing osteoarthritis of the first CMC joint however he has a negative grind test.  We discussed treatment options including splinting anti-inflammatories and ice for which the patient would like to proceed with and hold off on the CSI at this point in time.  Instructions and information have been provided    Return in about 4 weeks (around 10/22/2024).      Subjective:   Patient ID: Yunier Pichardo is a 69 y.o. male.    Yunier presents for 3-4 weeks of Left radial wrist and thumb pain sharp and worse with movement/use, denies n/t. States pain started overlying 1st MC.  Denies injury.        Review of Systems    The following portions of the patient's chart were reviewed and updated as appropriate:   Allergy:  No Known Allergies    Medications:    Current Outpatient Medications:     aspirin (ECOTRIN LOW STRENGTH) 81 mg EC tablet, Take 81 mg by mouth daily, Disp: , Rfl:     atorvastatin (LIPITOR) 40 mg tablet, TAKE ONE TABLET BY MOUTH ONCE DAILY, Disp: 90 tablet, Rfl: 0    lisinopril (ZESTRIL) 5 mg tablet, TAKE 1 TABLET BY MOUTH ONCE DAILY, Disp: 90 tablet, Rfl: 1    multivitamin (THERAGRAN) TABS, Take 1 tablet by mouth daily, Disp: , Rfl:     tamsulosin (FLOMAX) 0.4 mg, TAKE 1 CAPSULE BY MOUTH ONCE DAILY WITH DINNER, Disp: 90 capsule, Rfl: 1    Patient Active Problem List  "  Diagnosis    Essential hypertension    Overweight    Elevated prostate specific antigen (PSA)    BPH with urinary obstruction    History of transient ischemic attack (TIA)    Mixed hyperlipidemia    Tubular adenoma of colon    HAROON (obstructive sleep apnea)    Pancreatic mass    Left wrist pain    Primary osteoarthritis of first carpometacarpal joint of left hand    Tendinitis, de Quervain's       Objective:  /64   Pulse 56   Ht 5' 7\" (1.702 m)   Wt 80.7 kg (178 lb)   BMI 27.88 kg/m²     Left Hand Exam     Tenderness   The patient is experiencing tenderness in the radial area (Tenderness palpation over the radial styloid).     Range of Motion   Wrist   Flexion:  abnormal     Tests   Finkelstein's test: positive    Other   Erythema: absent  Sensation: normal  Pulse: present    Comments:  Negative CMC grind          Tests     Left Wrist/Hand   Positive Finkelstein's.       Physical Exam      Neurologic Exam    Procedures    I have personally reviewed pertinent films in PACS and my interpretation is Xrays Left Wrist .            Past Medical History:   Diagnosis Date    Hyperglycemia     Resolved 3/31/2016     Hypertension     Obstructive sleep apnea on CPAP     Sleep apnea     TIA (transient ischemic attack) 5/7/2024       Past Surgical History:   Procedure Laterality Date    CARDIAC LOOP RECORDER      CARDIAC SURGERY      COLONOSCOPY      Proctitis, otherwise nml. Dr. Moore. Resolved 12/3/2008        Social History     Socioeconomic History    Marital status: /Civil Union     Spouse name: Not on file    Number of children: Not on file    Years of education: Not on file    Highest education level: Not on file   Occupational History    Not on file   Tobacco Use    Smoking status: Never    Smokeless tobacco: Never   Vaping Use    Vaping status: Never Used   Substance and Sexual Activity    Alcohol use: Yes     Comment: Social     Drug use: Never    Sexual activity: Yes     Partners: Female     Birth " control/protection: Post-menopausal     Comment: 2 kids 33 and 31   Other Topics Concern    Not on file   Social History Narrative    Not on file     Social Determinants of Health     Financial Resource Strain: Low Risk  (3/28/2023)    Overall Financial Resource Strain (CARDIA)     Difficulty of Paying Living Expenses: Not very hard   Food Insecurity: No Food Insecurity (5/14/2024)    Hunger Vital Sign     Worried About Running Out of Food in the Last Year: Never true     Ran Out of Food in the Last Year: Never true   Transportation Needs: No Transportation Needs (5/14/2024)    PRAPARE - Transportation     Lack of Transportation (Medical): No     Lack of Transportation (Non-Medical): No   Physical Activity: Not on file   Stress: Not on file   Social Connections: Not on file   Intimate Partner Violence: Not on file   Housing Stability: Low Risk  (5/14/2024)    Housing Stability Vital Sign     Unable to Pay for Housing in the Last Year: No     Number of Times Moved in the Last Year: 1     Homeless in the Last Year: No       Family History   Problem Relation Age of Onset    Dementia Mother     Stroke Father     Hypertension Father     No Known Problems Sister     No Known Problems Daughter     No Known Problems Daughter     No Known Problems Sister     No Known Problems Sister     No Known Problems Sister

## 2024-09-24 NOTE — PATIENT INSTRUCTIONS
"You may use Advil (ibuprofen) 400-600mg every 6 hours or at least twice per day (OR Aleve (naproxen) 250-500mg every 12 hours as needed for pain and inflammation).  However do not mix or take other NSAIDs together such as Advil, Motrin, ibuprofen, Celebrex, naproxen, diclofenac or Aleve.    You may also take Tylenol (acetaminophen) together with Advil (ibuprofen) or Aleve (naproxen) as this is safe and can help decrease your pain levels.  The dosing for Tylenol is 500mg every 4-6 hours as needed OR max 1,000mg per dose up to 3 times per day for a total of 3,000mg per day  *Check with your primary care physician to see if these medications are safe to take and to make sure they do not interfere with your other medications and medical issues.        Patient Education     de Quervain tendinopathy   The Basics   Written by the doctors and editors at [facility]   What is de Quervain tendinopathy? -- de Quervain tendinopathy is a condition that causes pain in the thumb and wrist. It is caused by a problem with a tendon. Tendons are strong bands of tissue that connect muscles to bones. de Quervain tendinopathy is sometimes called \"de Quervain tenosynovitis.\"  de Quervain tendinopathy involves tendons that connect the forearm muscles to the thumb. These tendons and the covering around them get inflamed. This causes symptoms.  Most often, de Quervain tendinopathy happens when people use their wrist and thumb too much in certain ways. This includes gripping or grabbing objects (like a tool, golf club, or tennis racket) over and over. But, it can also happen to people for no obvious reason.  What are the symptoms of de Quervain tendinopathy? -- Symptoms include:   Pain in the wrist or thumb   Trouble gripping objects   Swelling in the wrist  Will I need tests? -- Probably not. Your doctor can usually tell if you have de Quervain tendinopathy by learning about your symptoms and doing an exam. During the exam, they will carefully " "check your thumb, hand, and wrist.  How is de Quervain tendinopathy treated? -- Treatment includes:   Resting your thumb - To avoid moving your thumb, you can wear a splint made for keeping the thumb still.   Ice - You can put a cold gel pack, bag of ice, or bag of frozen vegetables on the painful or swollen area every 4 to 6 hours, for 15 minutes each time.   Pain-relieving medicines called \"NSAIDs\" - NSAIDs are a large group of medicines that includes ibuprofen (sample brand names: Advil, Motrin) and naproxen (sample brand name: Aleve).   Exercises - After your symptoms improve, your doctor or nurse will show you exercises to help your wrist and thumb move more easily.  If your symptoms don't get better with treatment, your doctor might recommend other treatments. These can include:   Getting a shot of a steroid medicine around the tendon in your wrist - This can help with pain.   Surgery to cut or loosen the covering around the tendon  All topics are updated as new evidence becomes available and our peer review process is complete.  This topic retrieved from [employer] on: Feb 26, 2024.  Topic 34106 Version 12.0  Release: 32.2.4 - C32.56  © 2024 UpToDate, Inc. and/or its affiliates. All rights reserved.  Consumer Information Use and Disclaimer   Disclaimer: This generalized information is a limited summary of diagnosis, treatment, and/or medication information. It is not meant to be comprehensive and should be used as a tool to help the user understand and/or assess potential diagnostic and treatment options. It does NOT include all information about conditions, treatments, medications, side effects, or risks that may apply to a specific patient. It is not intended to be medical advice or a substitute for the medical advice, diagnosis, or treatment of a health care provider based on the health care provider's examination and assessment of a patient's specific and unique circumstances. Patients must speak with a " health care provider for complete information about their health, medical questions, and treatment options, including any risks or benefits regarding use of medications. This information does not endorse any treatments or medications as safe, effective, or approved for treating a specific patient. UpToDate, Inc. and its affiliates disclaim any warranty or liability relating to this information or the use thereof.The use of this information is governed by the Terms of Use, available at https://www.woltersAppistryuwer.com/en/know/clinical-effectiveness-terms. 2024© UpToDate, Inc. and its affiliates and/or licensors. All rights reserved.  Copyright   © 2024 UpToDate, Inc. and/or its affiliates. All rights reserved.

## 2024-09-27 LAB
DME PARACHUTE DELIVERY DATE REQUESTED: NORMAL
DME PARACHUTE ITEM DESCRIPTION: NORMAL
DME PARACHUTE ORDER STATUS: NORMAL
DME PARACHUTE SUPPLIER NAME: NORMAL
DME PARACHUTE SUPPLIER PHONE: NORMAL

## 2024-10-08 DIAGNOSIS — N40.1 BPH WITH URINARY OBSTRUCTION: ICD-10-CM

## 2024-10-08 DIAGNOSIS — N13.8 BPH WITH URINARY OBSTRUCTION: ICD-10-CM

## 2024-10-08 RX ORDER — TAMSULOSIN HYDROCHLORIDE 0.4 MG/1
CAPSULE ORAL
Qty: 90 CAPSULE | Refills: 1 | OUTPATIENT
Start: 2024-10-08

## 2024-10-08 NOTE — TELEPHONE ENCOUNTER
Pt called in for the following med refill once prescription is approved kindly call in his regular pharmacy. Thanks

## 2024-10-29 ENCOUNTER — OFFICE VISIT (OUTPATIENT)
Dept: OBGYN CLINIC | Facility: MEDICAL CENTER | Age: 69
End: 2024-10-29
Payer: MEDICARE

## 2024-10-29 VITALS
WEIGHT: 178 LBS | HEART RATE: 62 BPM | SYSTOLIC BLOOD PRESSURE: 144 MMHG | BODY MASS INDEX: 27.94 KG/M2 | DIASTOLIC BLOOD PRESSURE: 70 MMHG | HEIGHT: 67 IN

## 2024-10-29 DIAGNOSIS — M65.4 TENDINITIS, DE QUERVAIN'S: Primary | ICD-10-CM

## 2024-10-29 DIAGNOSIS — M25.532 LEFT WRIST PAIN: ICD-10-CM

## 2024-10-29 DIAGNOSIS — M25.562 CHRONIC PAIN OF LEFT KNEE: ICD-10-CM

## 2024-10-29 DIAGNOSIS — G89.29 CHRONIC PAIN OF LEFT KNEE: ICD-10-CM

## 2024-10-29 PROCEDURE — 99213 OFFICE O/P EST LOW 20 MIN: CPT | Performed by: EMERGENCY MEDICINE

## 2024-10-29 NOTE — PROGRESS NOTES
Assessment/Plan:    Diagnoses and all orders for this visit:    Tendinitis, de Quervain's    Left wrist pain    Chronic pain of left knee    Wean from night brace  Temporary handicap placard application provided  Reviewed prior imaging including MRIs and Xray    Return if symptoms worsen or fail to improve.      Subjective:   Patient ID: Yunier Pichardo is a 69 y.o. male.    Yunier returns for left radial wrist pain with significant improvement with IBU and night time splinting.  He stopped IBU a few days ago due to improvement, but did note increase BP from IBU  Also with continued chronic Left posterior knee pain which is present with prolonged walking.  He did undergone previous evaluation.  Notes he can live with it, however is hoping for temp handicap placard    Previous note:  Yunier presents for 3-4 weeks of Left radial wrist and thumb pain sharp and worse with movement/use, denies n/t. States pain started overlying 1st MC.  Denies injury.      Initial visit 2022:   NP PMHx TIA with loop recorder on Eliquis presents referred by PCP DR. Lance for chronic worsening Left posterior lateral calf pain which radiates up to posterior  knee, and this occurs with walking usually a mile or more.  Denies pain getting out of bed or walking around the house.  Denies N/T or back pain.  Notes BENEFIT from compression stockings with less pain and can walk further  He has undergone resting Arterial Doppler which was wnl.    Patient is very active plays soccer and walks.            Review of Systems    The following portions of the patient's chart were reviewed and updated as appropriate:   Allergy:  No Known Allergies    Medications:    Current Outpatient Medications:     aspirin (ECOTRIN LOW STRENGTH) 81 mg EC tablet, Take 81 mg by mouth daily, Disp: , Rfl:     atorvastatin (LIPITOR) 40 mg tablet, TAKE ONE TABLET BY MOUTH ONCE DAILY, Disp: 90 tablet, Rfl: 0    lisinopril (ZESTRIL) 5 mg tablet, TAKE 1 TABLET BY MOUTH ONCE  "DAILY, Disp: 90 tablet, Rfl: 1    multivitamin (THERAGRAN) TABS, Take 1 tablet by mouth daily, Disp: , Rfl:     tamsulosin (FLOMAX) 0.4 mg, TAKE 1 CAPSULE BY MOUTH ONCE DAILY WITH DINNER, Disp: 90 capsule, Rfl: 1    Patient Active Problem List   Diagnosis    Essential hypertension    Overweight    Elevated prostate specific antigen (PSA)    BPH with urinary obstruction    History of transient ischemic attack (TIA)    Mixed hyperlipidemia    Tubular adenoma of colon    HAROON (obstructive sleep apnea)    Pancreatic mass    Left wrist pain    Primary osteoarthritis of first carpometacarpal joint of left hand    Tendinitis, de Quervain's       Objective:  /70   Pulse 62   Ht 5' 7\" (1.702 m)   Wt 80.7 kg (178 lb)   BMI 27.88 kg/m²     Left Hand Exam     Tenderness   Left hand tenderness location: Tenderness palpation over the radial styloid.             Physical Exam      Neurologic Exam    Procedures    I have personally reviewed the written report of the pertinent studies.   [facility]  Outside Information  Results  US ARTERIAL DUPLEX LE RIGHT FOR PSEUDOANEURYSM ONLY (Order [id number])      suggestion  Information displayed in this report may not trend or trigger automated decision support.     US ARTERIAL DUPLEX LE RIGHT FOR PSEUDOANEURYSM ONLY  Order: [id number]  Impression    Impression:    1. Unremarkable study . Specifically, no pseudoaneurysm seen.             Past Medical History:   Diagnosis Date    Hyperglycemia     Resolved 3/31/2016     Hypertension     Obstructive sleep apnea on CPAP     Sleep apnea     TIA (transient ischemic attack) 5/7/2024       Past Surgical History:   Procedure Laterality Date    CARDIAC LOOP RECORDER      CARDIAC SURGERY      COLONOSCOPY      Proctitis, otherwise nml. Dr. Moore. Resolved 12/3/2008        Social History     Socioeconomic History    Marital status: /Civil Union     Spouse name: Not on file    Number of children: Not on file    Years of " education: Not on file    Highest education level: Not on file   Occupational History    Not on file   Tobacco Use    Smoking status: Never    Smokeless tobacco: Never   Vaping Use    Vaping status: Never Used   Substance and Sexual Activity    Alcohol use: Yes     Comment: Social     Drug use: Never    Sexual activity: Yes     Partners: Female     Birth control/protection: Post-menopausal     Comment: 2 kids 33 and 31   Other Topics Concern    Not on file   Social History Narrative    Not on file     Social Determinants of Health     Financial Resource Strain: Low Risk  (3/28/2023)    Overall Financial Resource Strain (CARDIA)     Difficulty of Paying Living Expenses: Not very hard   Food Insecurity: No Food Insecurity (5/14/2024)    Nursing - Inadequate Food Risk Classification     Worried About Running Out of Food in the Last Year: Never true     Ran Out of Food in the Last Year: Never true     Ran Out of Food in the Last Year: Not on file   Transportation Needs: No Transportation Needs (5/14/2024)    PRAPARE - Transportation     Lack of Transportation (Medical): No     Lack of Transportation (Non-Medical): No   Physical Activity: Not on file   Stress: Not on file   Social Connections: Not on file   Intimate Partner Violence: Not on file   Housing Stability: Low Risk  (5/14/2024)    Housing Stability Vital Sign     Unable to Pay for Housing in the Last Year: No     Number of Times Moved in the Last Year: 1     Homeless in the Last Year: No       Family History   Problem Relation Age of Onset    Dementia Mother     Stroke Father     Hypertension Father     No Known Problems Sister     No Known Problems Daughter     No Known Problems Daughter     No Known Problems Sister     No Known Problems Sister     No Known Problems Sister

## 2024-11-04 DIAGNOSIS — I10 ESSENTIAL HYPERTENSION: ICD-10-CM

## 2024-11-04 RX ORDER — ATORVASTATIN CALCIUM 40 MG/1
40 TABLET, FILM COATED ORAL DAILY
Qty: 30 TABLET | Refills: 0 | Status: SHIPPED | OUTPATIENT
Start: 2024-11-04

## 2024-11-04 NOTE — TELEPHONE ENCOUNTER
Reason for call:   [x] Refill   [] Prior Auth  [] Other:     Office:   [x] PCP/Provider - [facility]/ DO Leroy  [] Specialty/Provider -     Medication: atorvastatin (LIPITOR) 40 mg tablet     Dose/Frequency: TAKE ONE TABLET BY MOUTH ONCE DAILY    Quantity: 90    Pharmacy: [id number] - [place] - [address] [facility] [phone]    Does the patient have enough for 3 days?   [] Yes   [x] No - Send as HP to POD

## 2024-11-13 ENCOUNTER — RA CDI HCC (OUTPATIENT)
Dept: OTHER | Facility: HOSPITAL | Age: 69
End: 2024-11-13

## 2024-11-19 ENCOUNTER — OFFICE VISIT (OUTPATIENT)
Dept: FAMILY MEDICINE CLINIC | Facility: CLINIC | Age: 69
End: 2024-11-19
Payer: MEDICARE

## 2024-11-19 VITALS
HEIGHT: 67 IN | WEIGHT: 178 LBS | BODY MASS INDEX: 27.94 KG/M2 | SYSTOLIC BLOOD PRESSURE: 122 MMHG | TEMPERATURE: 97.4 F | OXYGEN SATURATION: 99 % | HEART RATE: 54 BPM | DIASTOLIC BLOOD PRESSURE: 70 MMHG

## 2024-11-19 DIAGNOSIS — K86.2 PANCREATIC CYST: ICD-10-CM

## 2024-11-19 DIAGNOSIS — G47.33 OSA (OBSTRUCTIVE SLEEP APNEA): ICD-10-CM

## 2024-11-19 DIAGNOSIS — I10 ESSENTIAL HYPERTENSION: Primary | ICD-10-CM

## 2024-11-19 DIAGNOSIS — Z86.73 HISTORY OF TRANSIENT ISCHEMIC ATTACK (TIA): ICD-10-CM

## 2024-11-19 DIAGNOSIS — E78.2 MIXED HYPERLIPIDEMIA: ICD-10-CM

## 2024-11-19 PROCEDURE — G2211 COMPLEX E/M VISIT ADD ON: HCPCS | Performed by: FAMILY MEDICINE

## 2024-11-19 PROCEDURE — 99214 OFFICE O/P EST MOD 30 MIN: CPT | Performed by: FAMILY MEDICINE

## 2024-11-19 RX ORDER — ATORVASTATIN CALCIUM 40 MG/1
40 TABLET, FILM COATED ORAL DAILY
Qty: 90 TABLET | Refills: 1 | Status: SHIPPED | OUTPATIENT
Start: 2024-11-19

## 2024-11-19 NOTE — ASSESSMENT & PLAN NOTE
Continue aspirin and good BP control as well as the statin He will see neurology as scheduled

## 2024-11-19 NOTE — ASSESSMENT & PLAN NOTE
LDL is at goal on lipitor continue that and followup in 6 months check labs   Orders:    Comprehensive metabolic panel; Future    Lipid panel; Future

## 2024-11-19 NOTE — PROGRESS NOTES
Name: Yunier Pichardo      : 1955      MRN: [id number]  Encounter Provider: Tanya Harper DO  Encounter Date: 2024   Encounter department: [facility]IMARY [facility]  :  Assessment & Plan  Essential hypertension  Blood pressure is controlled on lisinopril continue that repeat labs and followup in 6 months Heart healthy diet discussed  Orders:    Comprehensive metabolic panel; Future    Lipid panel; Future    atorvastatin (LIPITOR) 40 mg tablet; Take 1 tablet (40 mg total) by mouth daily    History of transient ischemic attack (TIA)  Continue aspirin and good BP control as well as the statin He will see neurology as scheduled       Mixed hyperlipidemia  LDL is at goal on lipitor continue that and followup in 6 months check labs   Orders:    Comprehensive metabolic panel; Future    Lipid panel; Future    HAROON (obstructive sleep apnea)  Stable on CPAP and continue with that and see sleep medicine        Pancreatic cyst  Endoscopic ultrasound and consult reviewed Patient to have followup MRI in spring will seen specialist at that time               History of Present Illness   Chief Complaint   Patient presents with    Hypertension    Hyperlipidemia       Patient is here for followup of hypertension history of TIA sleep apnea hyperlipidemia his wegiht and the pancreatic cyst patient notes and MRI from Dr Veronica was reviewed with him patient had repeat study and followup scheduled Patient put on 5 pounds in the last one year and is working on diet Patient plays pickleball patient was on a trip and over ate and thinks that is a big piece of this He has neurology followup scheudled He is sleeping really with with CPAP and his numbers are at goal He is tolerating this medications His blood pressure was good he needs labs       Review of Systems   Constitutional:  Negative for fatigue, fever and unexpected weight change.   HENT:  Negative for congestion, sinus pain and trouble swallowing.    Eyes:   "Negative for discharge and visual disturbance.   Respiratory:  Negative for cough, chest tightness, shortness of breath and wheezing.    Cardiovascular:  Negative for chest pain, palpitations and leg swelling.   Gastrointestinal:  Negative for abdominal pain, blood in stool, constipation, diarrhea, nausea and vomiting.   Genitourinary:  Negative for difficulty urinating, dysuria, frequency and hematuria.   Musculoskeletal:  Negative for arthralgias, gait problem and joint swelling.   Skin:  Negative for rash and wound.   Allergic/Immunologic: Negative for environmental allergies and food allergies.   Neurological:  Negative for dizziness, syncope, weakness, numbness and headaches.   Hematological:  Negative for adenopathy. Does not bruise/bleed easily.   Psychiatric/Behavioral:  Negative for confusion, decreased concentration and sleep disturbance. The patient is not nervous/anxious.           Objective   /70   Pulse (!) 54   Temp (!) 97.4 °F (36.3 °C)   Ht 5' 7\" (1.702 m)   Wt 80.7 kg (178 lb)   SpO2 99%   BMI 27.88 kg/m²      Physical Exam  Vitals and nursing note reviewed.   Constitutional:       Appearance: Normal appearance. He is well-developed.   HENT:      Head: Normocephalic and atraumatic.      Right Ear: Hearing, tympanic membrane and external ear normal.      Left Ear: Hearing, tympanic membrane and external ear normal.   Eyes:      Extraocular Movements: Extraocular movements intact.      Conjunctiva/sclera: Conjunctivae normal.      Pupils: Pupils are equal, round, and reactive to light.   Neck:      Thyroid: No thyromegaly.   Cardiovascular:      Rate and Rhythm: Normal rate and regular rhythm.      Heart sounds: Normal heart sounds.   Pulmonary:      Effort: Pulmonary effort is normal.      Breath sounds: Normal breath sounds. No wheezing or rales.   Abdominal:      General: Bowel sounds are normal. There is no distension.      Palpations: Abdomen is soft.      Tenderness: There is no " abdominal tenderness.   Musculoskeletal:         General: No tenderness.      Cervical back: Neck supple.   Lymphadenopathy:      Cervical: No cervical adenopathy.   Skin:     General: Skin is warm and dry.      Findings: No rash.   Neurological:      General: No focal deficit present.      Mental Status: He is alert and oriented to person, place, and time.      Cranial Nerves: No cranial nerve deficit.      Coordination: Coordination normal.   Psychiatric:         Mood and Affect: Mood normal.         Behavior: Behavior normal.         Thought Content: Thought content normal.         Judgment: Judgment normal.

## 2024-11-19 NOTE — ASSESSMENT & PLAN NOTE
Endoscopic ultrasound and consult reviewed Patient to have followup MRI in spring will seen specialist at that time

## 2024-11-19 NOTE — ASSESSMENT & PLAN NOTE
Blood pressure is controlled on lisinopril continue that repeat labs and followup in 6 months Heart healthy diet discussed  Orders:    Comprehensive metabolic panel; Future    Lipid panel; Future    atorvastatin (LIPITOR) 40 mg tablet; Take 1 tablet (40 mg total) by mouth daily

## 2024-12-17 ENCOUNTER — TELEPHONE (OUTPATIENT)
Age: 69
End: 2024-12-17

## 2024-12-17 NOTE — TELEPHONE ENCOUNTER
DELICIA Ceja Pt called to let Dr. Harper know he went and received his RSV vaccination yesterday at [facility].    He will upload documentation to [employer].  If he has trouble doing that he will drop a copy off to the office when he is in the area.

## 2025-01-28 DIAGNOSIS — I10 ESSENTIAL HYPERTENSION: ICD-10-CM

## 2025-01-28 NOTE — TELEPHONE ENCOUNTER
Last visit 11/19/2024  Next appt 05/23/2025    Patient is requesting a refill for atorvastatin (lipitor) 40 mg tablet at the [facility] pharmacy. Please advise.

## 2025-01-29 RX ORDER — ATORVASTATIN CALCIUM 40 MG/1
40 TABLET, FILM COATED ORAL DAILY
Qty: 30 TABLET | Refills: 0 | Status: SHIPPED | OUTPATIENT
Start: 2025-01-29

## 2025-01-31 ENCOUNTER — TELEPHONE (OUTPATIENT)
Dept: SURGICAL ONCOLOGY | Facility: CLINIC | Age: 70
End: 2025-01-31

## 2025-01-31 NOTE — TELEPHONE ENCOUNTER
LM letting patient know that Dr. Veronica is needed in surgery on 3/14 and his appt will need to be rescheduled. [facility] number given. Offered Wed 3/12 in [place] or Wed 3/19 in [place].

## 2025-02-05 ENCOUNTER — TELEPHONE (OUTPATIENT)
Age: 70
End: 2025-02-05

## 2025-03-04 ENCOUNTER — TELEPHONE (OUTPATIENT)
Age: 70
End: 2025-03-04

## 2025-03-04 ENCOUNTER — APPOINTMENT (OUTPATIENT)
Dept: LAB | Facility: CLINIC | Age: 70
End: 2025-03-04
Payer: MEDICARE

## 2025-03-04 DIAGNOSIS — Z86.73 HISTORY OF TRANSIENT ISCHEMIC ATTACK (TIA): ICD-10-CM

## 2025-03-04 DIAGNOSIS — E78.2 MIXED HYPERLIPIDEMIA: ICD-10-CM

## 2025-03-04 DIAGNOSIS — I10 ESSENTIAL HYPERTENSION: ICD-10-CM

## 2025-03-04 DIAGNOSIS — K86.89 PANCREATIC MASS: ICD-10-CM

## 2025-03-04 DIAGNOSIS — R97.20 ELEVATED PROSTATE SPECIFIC ANTIGEN (PSA): ICD-10-CM

## 2025-03-04 LAB
ANION GAP SERPL CALCULATED.3IONS-SCNC: 6 MMOL/L (ref 4–13)
BUN SERPL-MCNC: 21 MG/DL (ref 5–25)
CALCIUM SERPL-MCNC: 8.8 MG/DL (ref 8.4–10.2)
CHLORIDE SERPL-SCNC: 108 MMOL/L (ref 96–108)
CO2 SERPL-SCNC: 29 MMOL/L (ref 21–32)
CREAT SERPL-MCNC: 1.15 MG/DL (ref 0.6–1.3)
GFR SERPL CREATININE-BSD FRML MDRD: 65 ML/MIN/1.73SQ M
GLUCOSE SERPL-MCNC: 95 MG/DL (ref 65–140)
POTASSIUM SERPL-SCNC: 4.2 MMOL/L (ref 3.5–5.3)
SODIUM SERPL-SCNC: 143 MMOL/L (ref 135–147)

## 2025-03-04 PROCEDURE — 80048 BASIC METABOLIC PNL TOTAL CA: CPT

## 2025-03-04 PROCEDURE — 36415 COLL VENOUS BLD VENIPUNCTURE: CPT

## 2025-03-04 NOTE — TELEPHONE ENCOUNTER
Last visit 11/19/2024  Next appt 05/23/2025    Patient called to check on the status of the two medications that are pending. I informed patient that Dr Harper is not in today, but will come in tomorrow. Patient stated that he has enough medication for tomorrow.

## 2025-03-05 DIAGNOSIS — I10 ESSENTIAL HYPERTENSION: ICD-10-CM

## 2025-03-06 ENCOUNTER — HOSPITAL ENCOUNTER (OUTPATIENT)
Dept: MRI IMAGING | Facility: HOSPITAL | Age: 70
Discharge: HOME | End: 2025-03-06
Attending: SURGERY
Payer: MEDICARE

## 2025-03-06 DIAGNOSIS — K86.89 PANCREATIC MASS: ICD-10-CM

## 2025-03-06 PROCEDURE — A9585 GADOBUTROL INJECTION: HCPCS | Performed by: SURGERY

## 2025-03-06 PROCEDURE — 74183 MRI ABD W/O CNTR FLWD CNTR: CPT

## 2025-03-06 RX ORDER — LISINOPRIL 5 MG/1
5 TABLET ORAL DAILY
Qty: 90 TABLET | Refills: 0 | OUTPATIENT
Start: 2025-03-06

## 2025-03-06 RX ORDER — ATORVASTATIN CALCIUM 40 MG/1
40 TABLET, FILM COATED ORAL DAILY
Qty: 30 TABLET | Refills: 0 | Status: SHIPPED | OUTPATIENT
Start: 2025-03-06

## 2025-03-06 RX ORDER — LISINOPRIL 5 MG/1
5 TABLET ORAL DAILY
Qty: 90 TABLET | Refills: 1 | Status: SHIPPED | OUTPATIENT
Start: 2025-03-06

## 2025-03-06 RX ORDER — GADOBUTROL 604.72 MG/ML
8 INJECTION INTRAVENOUS
Status: COMPLETED | OUTPATIENT
Start: 2025-03-06 | End: 2025-03-06

## 2025-03-06 RX ADMIN — GADOBUTROL 8 ML: 604.72 INJECTION INTRAVENOUS at 12:01

## 2025-04-01 ENCOUNTER — OFFICE VISIT (OUTPATIENT)
Dept: SURGICAL ONCOLOGY | Facility: CLINIC | Age: 70
End: 2025-04-01
Payer: MEDICARE

## 2025-04-01 VITALS
WEIGHT: 183 LBS | OXYGEN SATURATION: 99 % | RESPIRATION RATE: 15 BRPM | SYSTOLIC BLOOD PRESSURE: 120 MMHG | BODY MASS INDEX: 28.72 KG/M2 | DIASTOLIC BLOOD PRESSURE: 62 MMHG | HEIGHT: 67 IN | HEART RATE: 53 BPM | TEMPERATURE: 97.9 F

## 2025-04-01 DIAGNOSIS — K86.2 PANCREATIC CYST: Primary | ICD-10-CM

## 2025-04-01 PROCEDURE — 99213 OFFICE O/P EST LOW 20 MIN: CPT | Performed by: SURGERY

## 2025-04-01 NOTE — PROGRESS NOTES
Surgical Oncology Consult       [address]  [facility] [place]  [address]  [facility] [address]    Yunier Pichardo  1955  [id number]  [address] [facility] [place]  [address]  [facility] [address]    Chief Complaint   Patient presents with    Follow-up       Assessment/Plan:    No problem-specific Assessment & Plan notes found for this encounter.       Diagnoses and all orders for this visit:    Pancreatic cyst  -     MRI abdomen w wo contrast and mrcp; Future  -     Basic metabolic panel; Future      Advance Care Planning/Advance Directives:  Discussed disease status, cancer treatment plans and/or cancer treatment goals with the patient.     Oncology History    No history exists.       History of Present Illness: Patient is a 69-year-old man with pancreatic cysts were been following with expectant management.  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  Presents today with no GI complaints to report.  MRI done in anticipation of today's visit.    Review of Systems   Constitutional: Negative.    HENT: Negative.     Eyes: Negative.    Respiratory: Negative.     Cardiovascular: Negative.    Gastrointestinal: Negative.    Endocrine: Negative.    Genitourinary: Negative.    Musculoskeletal: Negative.    Skin: Negative.    Allergic/Immunologic: Negative.    Neurological: Negative.    Hematological: Negative.    Psychiatric/Behavioral: Negative.     All other systems reviewed and are negative.        Patient Active Problem List   Diagnosis    Essential hypertension    Overweight    Elevated prostate specific antigen (PSA)    BPH with urinary obstruction    History of transient ischemic attack (TIA)    Mixed hyperlipidemia    Tubular adenoma of colon    HAROON (obstructive sleep apnea)    Pancreatic cyst    Left wrist pain    Primary osteoarthritis of first  carpometacarpal joint of left hand    Tendinitis, de Quervain's     Past Medical History:   Diagnosis Date    Hyperglycemia     Resolved 3/31/2016     Hypertension     Obstructive sleep apnea on CPAP     Sleep apnea     TIA (transient ischemic attack) 5/7/2024     Past Surgical History:   Procedure Laterality Date    CARDIAC LOOP RECORDER      CARDIAC SURGERY      COLONOSCOPY      Proctitis, otherwise nml. Dr. Moore. Resolved 12/3/2008      Family History   Problem Relation Age of Onset    Dementia Mother     Stroke Father     Hypertension Father     No Known Problems Sister     No Known Problems Daughter     No Known Problems Daughter     No Known Problems Sister     No Known Problems Sister     No Known Problems Sister      Social History     Socioeconomic History    Marital status: /Civil Union     Spouse name: Not on file    Number of children: Not on file    Years of education: Not on file    Highest education level: Not on file   Occupational History    Not on file   Tobacco Use    Smoking status: Never    Smokeless tobacco: Never   Vaping Use    Vaping status: Never Used   Substance and Sexual Activity    Alcohol use: Yes     Comment: Social     Drug use: Never    Sexual activity: Yes     Partners: Female     Birth control/protection: Post-menopausal     Comment: 2 kids 33 and 31   Other Topics Concern    Not on file   Social History Narrative    Not on file     Social Drivers of Health     Financial Resource Strain: Low Risk  (3/28/2023)    Overall Financial Resource Strain (CARDIA)     Difficulty of Paying Living Expenses: Not very hard   Food Insecurity: No Food Insecurity (5/14/2024)    Nursing - Inadequate Food Risk Classification     Worried About Running Out of Food in the Last Year: Never true     Ran Out of Food in the Last Year: Never true     Ran Out of Food in the Last Year: Not on file   Transportation Needs: No Transportation Needs (5/14/2024)    PRAPARE - Transportation     Lack of  Transportation (Medical): No     Lack of Transportation (Non-Medical): No   Physical Activity: Not on file   Stress: Not on file   Social Connections: Not on file   Intimate Partner Violence: Not on file   Housing Stability: Low Risk  (5/14/2024)    Housing Stability Vital Sign     Unable to Pay for Housing in the Last Year: No     Number of Times Moved in the Last Year: 1     Homeless in the Last Year: No       Current Outpatient Medications:     aspirin (ECOTRIN LOW STRENGTH) 81 mg EC tablet, Take 81 mg by mouth daily, Disp: , Rfl:     atorvastatin (LIPITOR) 40 mg tablet, Take 1 tablet (40 mg total) by mouth daily, Disp: 30 tablet, Rfl: 0    lisinopril (ZESTRIL) 5 mg tablet, Take 1 tablet (5 mg total) by mouth daily, Disp: 90 tablet, Rfl: 1    multivitamin (THERAGRAN) TABS, Take 1 tablet by mouth daily, Disp: , Rfl:     tamsulosin (FLOMAX) 0.4 mg, TAKE 1 CAPSULE BY MOUTH ONCE DAILY WITH DINNER, Disp: 90 capsule, Rfl: 1  No Known Allergies  Vitals:    04/01/25 1053   BP: 120/62   Pulse: (!) 53   Resp: 15   Temp: 97.9 °F (36.6 °C)   SpO2: 99%       Physical Exam  Vitals reviewed.   Constitutional:       Appearance: Normal appearance.   HENT:      Head: Normocephalic and atraumatic.      Right Ear: External ear normal.      Left Ear: External ear normal.      Nose: Nose normal.   Eyes:      Extraocular Movements: Extraocular movements intact.      Pupils: Pupils are equal, round, and reactive to light.   Cardiovascular:      Rate and Rhythm: Normal rate and regular rhythm.      Heart sounds: Normal heart sounds.   Pulmonary:      Effort: Pulmonary effort is normal.      Breath sounds: Normal breath sounds.   Abdominal:      General: Abdomen is flat.      Palpations: Abdomen is soft.   Musculoskeletal:         General: Normal range of motion.      Cervical back: Normal range of motion and neck supple.   Skin:     General: Skin is warm and dry.   Neurological:      General: No focal deficit present.      Mental Status:  He is alert and oriented to person, place, and time.   Psychiatric:         Mood and Affect: Mood normal.         Behavior: Behavior normal.         Thought Content: Thought content normal.         Judgment: Judgment normal.         Pathology:  none    Labs:  Lab Results   Component Value Date     04/02/2016    SODIUM 143 03/04/2025    K 4.2 03/04/2025     03/04/2025    CO2 29 03/04/2025    ANIONGAP 4 03/03/2014    AGAP 6 03/04/2025    BUN 21 03/04/2025    CREATININE 1.15 03/04/2025    GLUC 95 03/04/2025    GLUF 98 11/06/2023    CALCIUM 8.8 03/04/2025    AST 16 01/30/2024    ALT 18 01/30/2024    ALKPHOS 60 01/30/2024    PROT 6.9 04/02/2016    TP 7.0 01/30/2024    BILITOT 0.6 04/02/2016    TBILI 0.42 01/30/2024    EGFR 65 03/04/2025         Imaging  REMOTE LOOP INTERROGATION  Result Date: 3/25/2025  Impression: Routine loop recorder monthly summary report; Indication for Monitoring: CVA; Monitoring Period: 2/22-3/25; Presenting rhythm (as of 3/24) = Normal sinus rhythm and battery status=OK; 2 tachy episodes detected with ECGs showing narrow complex tachycardia  with oversensing, longest 47 sec; 0 symptom, pause, benny, AT, or AF episodes detected since last transmission; Appropriately functioning loop recorder.    MRI abdomen w wo contrast and mrcp  Result Date: 3/11/2025  Narrative: MRI OF THE ABDOMEN WITH AND WITHOUT CONTRAST WITH MRCP INDICATION: 69 years / Male. K86.89: Other specified diseases of pancreas. COMPARISON: MRI abdomen 2/8/2024. Numerous older studies endoscopic ultrasound 5/7/2024. TECHNIQUE: Multiplanar/multisequence MRI of the abdomen with 3D MRCP was performed before and after administration of contrast. IV Contrast: 8 mL of Gadobutrol injection FINDINGS: LOWER CHEST: Unremarkable. LIVER: Normal in size and configuration. No suspicious mass. Patent hepatic and portal veins. BILE DUCTS: No intrahepatic or extrahepatic bile duct dilation. Common bile duct is normal in caliber. No  choledocholithiasis, biliary stricture or suspicious mass. GALLBLADDER: Normal. PANCREAS: Unchanged dominant 24 mm pancreatic neck cyst #5/15 and adjacent multiloculated 17 mm cyst #5/15. Communication with the main pancreatic duct is demonstrated on this exam and on prior EUS indicating branch duct IPMNs.. Other smaller scattered subcentimeter cysts obscured by motion artifact on the Immediate comparison MRI. No new worrisome or high-risk features. No main pancreatic ductal dilation. ADRENAL GLANDS: Reidentified 11 mm left adrenal cysts SPLEEN: Normal. KIDNEYS/PROXIMAL URETERS: No hydroureteronephrosis. No suspicious renal mass. Simple cysts. BOWEL: No dilated loops of bowel. PERITONEUM/RETROPERITONEUM: No ascites. LYMPH NODES: No abdominal lymphadenopathy. VESSELS: No aneurysm. ABDOMINAL WALL: Unremarkable. BONES: No suspicious osseous lesion.     Impression: Unchanged dominant 24 mm and 17 mm pancreatic neck cysts have been evaluated with EUS/FNA. Management/follow-up interval as Surgical Oncology. Workstation performed: [id number]     I reviewed the above laboratory and imaging data.    Discussion/Summary: 69-year-old man pancreatic cyst, stable/unchanged.  Prior biopsy benign.  Will follow-up in 1 year with MRI/MRCP for continued surveillance.  All questions answered and copies of reports given patient for his records.

## 2025-04-03 DIAGNOSIS — N40.1 BPH WITH URINARY OBSTRUCTION: ICD-10-CM

## 2025-04-03 DIAGNOSIS — N13.8 BPH WITH URINARY OBSTRUCTION: ICD-10-CM

## 2025-04-03 NOTE — TELEPHONE ENCOUNTER
Medication: tamsulosin    Dose/Frequency: 0.4mg OD    Quantity: 90    Pharmacy: [facility]    Office:   [x] PCP/Provider -   [] Speciality/Provider -     Does the patient have enough for 3 days?   [x] Yes   [] No - Send as HP to POD

## 2025-04-04 RX ORDER — TAMSULOSIN HYDROCHLORIDE 0.4 MG/1
0.4 CAPSULE ORAL
Qty: 90 CAPSULE | Refills: 1 | Status: SHIPPED | OUTPATIENT
Start: 2025-04-04

## 2025-04-06 DIAGNOSIS — I10 ESSENTIAL HYPERTENSION: ICD-10-CM

## 2025-04-06 NOTE — TELEPHONE ENCOUNTER
Medication Refill Request       Medication: atorvastatin (LIPITOR) 40 mg tablet     Dose/Frequency: Take 1 tablet (40 mg total) by mouth daily     Quantity: 90    Pharmacy: [id number] - [place] - [address] [facility]     Office:   [x] PCP/Provider -   [] Specialty/Provider -     Does the patient have enough for 3 days?   [] Yes   [x] No - Send as HP to POD    Is the patient completely out of the medication or does not have enough until the next business day?  [] Yes - send to [facility] Hub  [x] No - Send as HP to POD

## 2025-04-07 ENCOUNTER — TELEPHONE (OUTPATIENT)
Age: 70
End: 2025-04-07

## 2025-04-07 DIAGNOSIS — I10 ESSENTIAL HYPERTENSION: ICD-10-CM

## 2025-04-07 RX ORDER — ATORVASTATIN CALCIUM 40 MG/1
40 TABLET, FILM COATED ORAL DAILY
Qty: 30 TABLET | Refills: 0 | Status: SHIPPED | OUTPATIENT
Start: 2025-04-07

## 2025-04-07 NOTE — TELEPHONE ENCOUNTER
Patient called, request status of medication refill   atorvastatin (LIPITOR) 40 mg tablet [[id number]] . Patient states he's leaving to go out of town, and only has 2 days worth. Patient requests prescription to be submitted to   [id number] - [place] - [address]  [address], [place] [address]  Phone: [phone]  Fax: [phone]   Patient request a callback with suggestions. Please advise patent at [phone], if any further questions/

## 2025-04-07 NOTE — TELEPHONE ENCOUNTER
Last visit 11/19/2024  Next appt 05/23/2025    Patient noticed that he received a refill for the 2nd time again for only 30 days of the atorvastatin (lipitor) 40 mg tablet, even after requesting a 90 days refill. Patient stated that for the past year he has been receiving 90 day refills and would appreciate it if the next refill and thereafter could be changed to 90. Patient stated that it is very inconvenient having to go to the pharmacy every month. Patient mentioned if the doctor changed the script for a certain reason to please notify him. Please message through [employer]. Please advise.

## 2025-04-29 ENCOUNTER — TELEPHONE (OUTPATIENT)
Dept: SLEEP CENTER | Facility: CLINIC | Age: 70
End: 2025-04-29

## 2025-04-29 DIAGNOSIS — G47.33 OSA (OBSTRUCTIVE SLEEP APNEA): Primary | ICD-10-CM

## 2025-04-30 NOTE — TELEPHONE ENCOUNTER
Called patient and advised Rx for replacement CPAP written and will be sent to [facility].  One of their representatives should reach out in 7-10 business days to schedule set up of the machine in the sleep center.     Patient states his current machine is still working so he plans to schedule set up sometime in July.      Compliance follow up is already scheduled 9/5/25. Patient advised that insurance requires that PAP is used for at least 30 days before the compliance appointment. Patient verbalized understanding.     Notified office staff via [employer] secure chat to process CPAP Rx.

## 2025-05-05 ENCOUNTER — TELEPHONE (OUTPATIENT)
Dept: SLEEP CENTER | Facility: CLINIC | Age: 70
End: 2025-05-05

## 2025-05-12 ENCOUNTER — TELEPHONE (OUTPATIENT)
Dept: UROLOGY | Facility: MEDICAL CENTER | Age: 70
End: 2025-05-12

## 2025-05-12 NOTE — TELEPHONE ENCOUNTER
Pt on the recall list for may. Pt was called twice. Letter now sent asking pt to call to schedule appt.

## 2025-05-16 LAB

## 2025-05-19 ENCOUNTER — RESULTS FOLLOW-UP (OUTPATIENT)
Dept: FAMILY MEDICINE CLINIC | Facility: CLINIC | Age: 70
End: 2025-05-19

## 2025-05-19 ENCOUNTER — RA CDI HCC (OUTPATIENT)
Dept: OTHER | Facility: HOSPITAL | Age: 70
End: 2025-05-19

## 2025-05-19 ENCOUNTER — APPOINTMENT (OUTPATIENT)
Dept: LAB | Facility: CLINIC | Age: 70
End: 2025-05-19
Payer: MEDICARE

## 2025-05-19 DIAGNOSIS — I10 ESSENTIAL HYPERTENSION: ICD-10-CM

## 2025-05-19 DIAGNOSIS — K86.2 PANCREATIC CYST: ICD-10-CM

## 2025-05-19 DIAGNOSIS — E78.2 MIXED HYPERLIPIDEMIA: ICD-10-CM

## 2025-05-19 LAB
ALBUMIN SERPL BCG-MCNC: 3.8 G/DL (ref 3.5–5)
ALP SERPL-CCNC: 55 U/L (ref 34–104)
ALT SERPL W P-5'-P-CCNC: 16 U/L (ref 7–52)
ANION GAP SERPL CALCULATED.3IONS-SCNC: 7 MMOL/L (ref 4–13)
AST SERPL W P-5'-P-CCNC: 19 U/L (ref 13–39)
BILIRUB SERPL-MCNC: 0.71 MG/DL (ref 0.2–1)
BUN SERPL-MCNC: 18 MG/DL (ref 5–25)
CALCIUM SERPL-MCNC: 8.8 MG/DL (ref 8.4–10.2)
CHLORIDE SERPL-SCNC: 107 MMOL/L (ref 96–108)
CHOLEST SERPL-MCNC: 103 MG/DL (ref ?–200)
CO2 SERPL-SCNC: 28 MMOL/L (ref 21–32)
CREAT SERPL-MCNC: 1.31 MG/DL (ref 0.6–1.3)
GFR SERPL CREATININE-BSD FRML MDRD: 55 ML/MIN/1.73SQ M
GLUCOSE P FAST SERPL-MCNC: 93 MG/DL (ref 65–99)
HDLC SERPL-MCNC: 51 MG/DL
LDLC SERPL CALC-MCNC: 43 MG/DL (ref 0–100)
NONHDLC SERPL-MCNC: 52 MG/DL
POTASSIUM SERPL-SCNC: 4.4 MMOL/L (ref 3.5–5.3)
PROT SERPL-MCNC: 6.8 G/DL (ref 6.4–8.4)
PSA SERPL-MCNC: 4.02 NG/ML (ref 0–4)
SODIUM SERPL-SCNC: 142 MMOL/L (ref 135–147)
TRIGL SERPL-MCNC: 47 MG/DL (ref ?–150)

## 2025-05-19 RX ORDER — ATORVASTATIN CALCIUM 40 MG/1
40 TABLET, FILM COATED ORAL DAILY
Qty: 90 TABLET | Refills: 1 | Status: SHIPPED | OUTPATIENT
Start: 2025-05-19

## 2025-05-19 NOTE — TELEPHONE ENCOUNTER
Patient came into office today about Lipitor and duration, needs 90 day. Due for refill, changed to 90 day supply, 1 refill. Has appt Friday with Dr. Harper.

## 2025-05-22 NOTE — TELEPHONE ENCOUNTER
Call placed. Spoke to pt. Advised pt that we can get pt in sooner to see Dr. Williamson. Pt offered month of June. Pt scheduled 6/16 with Dr. Williamson. August appointment was canceled.

## 2025-05-22 NOTE — TELEPHONE ENCOUNTER
Patient called today to schedule yearly follow up visit with Dr Williamson. Pt is due around 5/13/25 but is scheduled in next avail of 8/8 at 2:45 PM and is on the wait list.    In addition the patient states that he had PSA done and results are available in [facility].    Call back if needed [phone]

## 2025-05-23 ENCOUNTER — OFFICE VISIT (OUTPATIENT)
Dept: FAMILY MEDICINE CLINIC | Facility: CLINIC | Age: 70
End: 2025-05-23
Payer: MEDICARE

## 2025-05-23 VITALS
DIASTOLIC BLOOD PRESSURE: 78 MMHG | BODY MASS INDEX: 28.83 KG/M2 | WEIGHT: 183.7 LBS | HEIGHT: 67 IN | OXYGEN SATURATION: 100 % | TEMPERATURE: 97.3 F | SYSTOLIC BLOOD PRESSURE: 118 MMHG | HEART RATE: 84 BPM

## 2025-05-23 DIAGNOSIS — K86.2 PANCREATIC CYST: ICD-10-CM

## 2025-05-23 DIAGNOSIS — N13.8 BPH WITH URINARY OBSTRUCTION: ICD-10-CM

## 2025-05-23 DIAGNOSIS — M51.360 DEGENERATION OF INTERVERTEBRAL DISC OF LUMBAR REGION WITH DISCOGENIC BACK PAIN: ICD-10-CM

## 2025-05-23 DIAGNOSIS — G47.33 OSA (OBSTRUCTIVE SLEEP APNEA): ICD-10-CM

## 2025-05-23 DIAGNOSIS — R97.20 ELEVATED PROSTATE SPECIFIC ANTIGEN (PSA): ICD-10-CM

## 2025-05-23 DIAGNOSIS — Z86.73 HISTORY OF TRANSIENT ISCHEMIC ATTACK (TIA): ICD-10-CM

## 2025-05-23 DIAGNOSIS — E78.2 MIXED HYPERLIPIDEMIA: ICD-10-CM

## 2025-05-23 DIAGNOSIS — Z00.00 MEDICARE ANNUAL WELLNESS VISIT, SUBSEQUENT: Primary | ICD-10-CM

## 2025-05-23 DIAGNOSIS — I10 ESSENTIAL HYPERTENSION: ICD-10-CM

## 2025-05-23 DIAGNOSIS — D12.6 TUBULAR ADENOMA OF COLON: ICD-10-CM

## 2025-05-23 DIAGNOSIS — E66.3 OVERWEIGHT WITH BODY MASS INDEX (BMI) OF 28 TO 28.9 IN ADULT: ICD-10-CM

## 2025-05-23 DIAGNOSIS — N40.1 BPH WITH URINARY OBSTRUCTION: ICD-10-CM

## 2025-05-23 PROBLEM — H91.93 HEARING DIFFICULTY OF BOTH EARS: Status: ACTIVE | Noted: 2024-11-22

## 2025-05-23 PROBLEM — M18.12 PRIMARY OSTEOARTHRITIS OF FIRST CARPOMETACARPAL JOINT OF LEFT HAND: Status: RESOLVED | Noted: 2024-09-24 | Resolved: 2025-05-23

## 2025-05-23 PROBLEM — M25.532 LEFT WRIST PAIN: Status: RESOLVED | Noted: 2024-09-24 | Resolved: 2025-05-23

## 2025-05-23 PROCEDURE — G2211 COMPLEX E/M VISIT ADD ON: HCPCS | Performed by: FAMILY MEDICINE

## 2025-05-23 PROCEDURE — 99214 OFFICE O/P EST MOD 30 MIN: CPT | Performed by: FAMILY MEDICINE

## 2025-05-23 PROCEDURE — G0439 PPPS, SUBSEQ VISIT: HCPCS | Performed by: FAMILY MEDICINE

## 2025-05-23 NOTE — ASSESSMENT & PLAN NOTE
Continue with aspirin and the lisinopril for BP control and the lipitor for cholesterol control Patient encouraged to keep a heart healthy diet

## 2025-05-23 NOTE — PROGRESS NOTES
Name: Yunier Pichardo      : 1955      MRN: [id number]  Encounter Provider: Tanya Harper DO  Encounter Date: 2025   Encounter department: [facility]IMARY [facility]  :  Assessment & Plan  Medicare annual wellness visit, subsequent  Shots up to date Patient to see eye doctor and dentist Patient is up to date with PSA and will se urology Patint was given advnaced directive paperwork Patient diet and exercise were discussed today        Tubular adenoma of colon  Colonoscopy in        Essential hypertension  Stable on lisinopril continue that and follwoup in 6 months   Orders:    Comprehensive metabolic panel; Future    Lipid panel; Future    History of transient ischemic attack (TIA)  Continue with aspirin and the lisinopril for BP control and the lipitor for cholesterol control Patient encouraged to keep a heart healthy diet        Mixed hyperlipidemia  Lipid goal is < 70 conitnue lipitor and followup with labs and visit in 6 months   Orders:    Comprehensive metabolic panel; Future    Lipid panel; Future    HAROON (obstructive sleep apnea)  Continue CPAP       Pancreatic cyst  Reviewed last MRI and last visit from Dr Veronica Patient to see me in 6 months MRI is scheduled       Overweight with body mass index (BMI) of 28 to 28.9 in adult  BMI Counseling: Body mass index is 28.73 kg/m². The BMI is above normal. Nutrition recommendations include reducing portion sizes, decreasing overall calorie intake, 3-5 servings of fruits/vegetables daily, and moderation in carbohydrate intake. Exercise recommendations include exercising 3-5 times per week.    Orders:    Comprehensive metabolic panel; Future    BPH with urinary obstruction  Continue flomax and see urology       Elevated prostate specific antigen (PSA)  PSA is much lower than prior continue flomax and see urology as scheduled       Degeneration of intervertebral disc of lumbar region with discogenic back pain  Reviewed the MRI and notes  "from Dr Lui DJSOUMYA L3-L5 continue to work on posture and use tylenol as needed          Preventive health issues were discussed with patient, and age appropriate screening tests were ordered as noted in patient's After Visit Summary. Personalized health advice and appropriate referrals for health education or preventive services given if needed, as noted in patient's After Visit Summary.    History of Present Illness     Patient is here with wife for medicare wellness and followup of hypertension hyperlipidemia history of TIA BPH with elevated PSA sleep apnea pancreatic cyst and history of colon polyp Patient wife is inquiring about his posture and the \"rounded shoulders\" She was concerned maybe he needs a brace I reviewed 2022 consult with ortho and the MRI done at that time showing DJD and that this is the issues Offered PT they decline Patient is taking aspirin lisinopril and lipitor He has had followup with both neurology and neuropsychiatric testing and is stable He is seeing surgical oncology for pancreatic cyst Patient is to continue iwht the CPAP        Patient Care Team:  Tanya Harper DO as PCP - General (Family Medicine)  Tanya Harper DO as PCP - PCP-[facility] MD Lucien Foote, RN as Nurse Navigator (Oncology)  Franky Veronica MD (Oncology)    Review of Systems   Constitutional:  Negative for fatigue, fever and unexpected weight change.   HENT:  Negative for congestion, sinus pain and trouble swallowing.    Eyes:  Negative for discharge and visual disturbance.   Respiratory:  Negative for cough, chest tightness, shortness of breath and wheezing.    Cardiovascular:  Negative for chest pain, palpitations and leg swelling.   Gastrointestinal:  Negative for abdominal pain, blood in stool, constipation, diarrhea, nausea and vomiting.   Genitourinary:  Negative for difficulty urinating, dysuria, frequency and hematuria.   Musculoskeletal:  Negative " for arthralgias, gait problem and joint swelling.        Posture is not as it was   Skin:  Negative for rash and wound.   Allergic/Immunologic: Negative for environmental allergies and food allergies.   Neurological:  Negative for dizziness, syncope, weakness, numbness and headaches.   Hematological:  Negative for adenopathy. Does not bruise/bleed easily.   Psychiatric/Behavioral:  Negative for confusion, decreased concentration and sleep disturbance. The patient is not nervous/anxious.      Medical History Reviewed by provider this encounter:  Tobacco  Allergies  Meds  Problems  Med Hx  Surg Hx  Fam Hx       Annual Wellness Visit Questionnaire   Yunier is here for his Subsequent Wellness visit.     Health Risk Assessment:   Patient rates overall health as very good. Patient feels that their physical health rating is same. Patient is satisfied with their life. Eyesight was rated as same. Hearing was rated as same. Patient feels that their emotional and mental health rating is same. Patients states they are never, rarely angry. Patient states they are never, rarely unusually tired/fatigued. Pain experienced in the last 7 days has been none. Patient states that he has experienced no weight loss or gain in last 6 months.     Depression Screening:   PHQ-2 Score: 0      Fall Risk Screening:   In the past year, patient has experienced: no history of falling in past year      Home Safety:  Patient does not have trouble with stairs inside or outside of their home. Patient has working smoke alarms and has working carbon monoxide detector. Home safety hazards include: none.     Nutrition:   Current diet is Regular.     Medications:   Patient is currently taking over-the-counter supplements. OTC medications include: see medication list. Patient is able to manage medications.     Activities of Daily Living (ADLs)/Instrumental Activities of Daily Living (IADLs):   Walk and transfer into and out of bed and chair?:  Yes  Dress and groom yourself?: Yes    Bathe or shower yourself?: Yes    Feed yourself? Yes  Do your laundry/housekeeping?: Yes  Manage your money, pay your bills and track your expenses?: Yes  Make your own meals?: Yes    Do your own shopping?: Yes    Previous Hospitalizations:   Any hospitalizations or ED visits within the last 12 months?: No      Advance Care Planning:   Living will: No    Durable POA for healthcare: No    Advanced directive: No    ACP document given: Yes      Cognitive Screening:   Provider or family/friend/caregiver concerned regarding cognition?: No    Preventive Screenings      Cardiovascular Screening:    General: Screening Not Indicated and History Lipid Disorder      Diabetes Screening:     General: Screening Current      Colorectal Cancer Screening:     General: Screening Current      Prostate Cancer Screening:    General: Screening Current      Osteoporosis Screening:    General: Screening Not Indicated      Abdominal Aortic Aneurysm (AAA) Screening:    Risk factors include: age between 65-74 yo        General: Risks and Benefits Discussed and Screening Current      Lung Cancer Screening:     General: Screening Not Indicated      Hepatitis C Screening:    General: Screening Current    Screening, Brief Intervention, and Referral to Treatment (SBIRT)     Screening  Typical number of drinks in a day: 0  Typical number of drinks in a week: 2  Interpretation: Low risk drinking behavior.    AUDIT-C Screenin) How often did you have a drink containing alcohol in the past year? monthly or less  2) How many drinks did you have on a typical day when you were drinking in the past year? 1 to 2  3) How often did you have 6 or more drinks on one occasion in the past year? never    AUDIT-C Score: 1  Interpretation: Score 0-3 (male): Negative screen for alcohol misuse    Single Item Drug Screening:  How often have you used an illegal drug (including marijuana) or a prescription medication for  "non-medical reasons in the past year? never    Single Item Drug Screen Score: 0  Interpretation: Negative screen for possible drug use disorder    Social Drivers of Health     Financial Resource Strain: Low Risk  (3/28/2023)    Overall Financial Resource Strain (CARDIA)     Difficulty of Paying Living Expenses: Not very hard   Food Insecurity: No Food Insecurity (5/23/2025)    Nursing - Inadequate Food Risk Classification     Worried About Running Out of Food in the Last Year: Never true     Ran Out of Food in the Last Year: Never true   Transportation Needs: No Transportation Needs (5/23/2025)    PRAPARE - Transportation     Lack of Transportation (Medical): No     Lack of Transportation (Non-Medical): No   Housing Stability: Low Risk  (5/23/2025)    Housing Stability Vital Sign     Unable to Pay for Housing in the Last Year: No     Number of Times Moved in the Last Year: 0     Homeless in the Last Year: No   Utilities: Not At Risk (5/23/2025)    [facility] Utilities     Threatened with loss of utilities: No     No results found.    Objective   /78 (BP Location: Left arm, Patient Position: Sitting, Cuff Size: Standard)   Pulse 84   Temp (!) 97.3 °F (36.3 °C) (Tympanic)   Ht 5' 7.05\" (1.703 m)   Wt 83.3 kg (183 lb 11.2 oz)   SpO2 100%   BMI 28.73 kg/m²     Physical Exam  Vitals and nursing note reviewed.   Constitutional:       Appearance: Normal appearance. He is well-developed.   HENT:      Head: Normocephalic and atraumatic.      Right Ear: Hearing, tympanic membrane and external ear normal.      Left Ear: Hearing, tympanic membrane and external ear normal.     Eyes:      Extraocular Movements: Extraocular movements intact.      Conjunctiva/sclera: Conjunctivae normal.      Pupils: Pupils are equal, round, and reactive to light.     Neck:      Thyroid: No thyromegaly.     Cardiovascular:      Rate and Rhythm: Normal rate and regular rhythm.      Heart sounds: Normal heart sounds.   Pulmonary:      Effort: " Pulmonary effort is normal.      Breath sounds: Normal breath sounds. No wheezing or rales.   Abdominal:      General: Bowel sounds are normal. There is no distension.      Palpations: Abdomen is soft.      Tenderness: There is no abdominal tenderness.     Musculoskeletal:         General: No tenderness.      Cervical back: Neck supple.      Comments: Slight kyphoscoliosis due to the DJD    Lymphadenopathy:      Cervical: No cervical adenopathy.     Skin:     General: Skin is warm and dry.      Findings: No rash.     Neurological:      General: No focal deficit present.      Mental Status: He is alert and oriented to person, place, and time.      Cranial Nerves: No cranial nerve deficit.      Coordination: Coordination normal.     Psychiatric:         Mood and Affect: Mood normal.         Behavior: Behavior normal.         Thought Content: Thought content normal.         Judgment: Judgment normal.

## 2025-05-23 NOTE — ASSESSMENT & PLAN NOTE
Reviewed the MRI and notes from Dr Hu CASANOVA L3-L5 continue to work on posture and use tylenol as needed

## 2025-05-23 NOTE — ASSESSMENT & PLAN NOTE
Lipid goal is < 70 conitnue lipitor and followup with labs and visit in 6 months   Orders:    Comprehensive metabolic panel; Future    Lipid panel; Future

## 2025-05-23 NOTE — PATIENT INSTRUCTIONS
Medicare Preventive Visit Patient Instructions  Thank you for completing your Welcome to Medicare Visit or Medicare Annual Wellness Visit today. Your next wellness visit will be due in one year (5/24/2026).  The screening/preventive services that you may require over the next 5-10 years are detailed below. Some tests may not apply to you based off risk factors and/or age. Screening tests ordered at today's visit but not completed yet may show as past due. Also, please note that scanned in results may not display below.  Preventive Screenings:  Service Recommendations Previous Testing/Comments   Colorectal Cancer Screening  Colonoscopy    Fecal Occult Blood Test (FOBT)/Fecal Immunochemical Test (FIT)  Fecal DNA/Cologuard Test  Flexible Sigmoidoscopy Age: 45-75 years old   Colonoscopy: every 10 years (May be performed more frequently if at higher risk)  OR  FOBT/FIT: every 1 year  OR  Cologuard: every 3 years  OR  Sigmoidoscopy: every 5 years  Screening may be recommended earlier than age 45 if at higher risk for colorectal cancer. Also, an individualized decision between you and your healthcare provider will decide whether screening between the ages of 76-85 would be appropriate. Colonoscopy: 12/05/2023  FOBT/FIT: Not on file  Cologuard: Not on file  Sigmoidoscopy: Not on file    Screening Current     Prostate Cancer Screening Individualized decision between patient and health care provider in men between ages of 55-69   Medicare will cover every 12 months beginning on the day after your 50th birthday PSA: 4.021 ng/mL     Screening Current     Hepatitis C Screening Once for adults born between 1945 and 1965  More frequently in patients at high risk for Hepatitis C Hep C Antibody: 04/30/2020    Screening Current   Diabetes Screening 1-2 times per year if you're at risk for diabetes or have pre-diabetes Fasting glucose: 93 mg/dL (5/19/2025)  A1C: 5.6 % (12/24/2021)  Screening Current   Cholesterol Screening Once every  5 years if you don't have a lipid disorder. May order more often based on risk factors. Lipid panel: 05/19/2025  Screening Not Indicated  History Lipid Disorder      Other Preventive Screenings Covered by Medicare:  Abdominal Aortic Aneurysm (AAA) Screening: covered once if your at risk. You're considered to be at risk if you have a family history of AAA or a male between the age of 65-75 who smoking at least 100 cigarettes in your lifetime.  Lung Cancer Screening: covers low dose CT scan once per year if you meet all of the following conditions: (1) Age 55-77; (2) No signs or symptoms of lung cancer; (3) Current smoker or have quit smoking within the last 15 years; (4) You have a tobacco smoking history of at least 20 pack years (packs per day x number of years you smoked); (5) You get a written order from a healthcare provider.  Glaucoma Screening: covered annually if you're considered high risk: (1) You have diabetes OR (2) Family history of glaucoma OR (3)  aged 50 and older OR (4)  American aged 65 and older  Osteoporosis Screening: covered every 2 years if you meet one of the following conditions: (1) Have a vertebral abnormality; (2) On glucocorticoid therapy for more than 3 months; (3) Have primary hyperparathyroidism; (4) On osteoporosis medications and need to assess response to drug therapy.  HIV Screening: covered annually if you're between the age of 15-65. Also covered annually if you are younger than 15 and older than 65 with risk factors for HIV infection. For pregnant patients, it is covered up to 3 times per pregnancy.    Immunizations:  Immunization Recommendations   Influenza Vaccine Annual influenza vaccination during flu season is recommended for all persons aged >= 6 months who do not have contraindications   Pneumococcal Vaccine   * Pneumococcal conjugate vaccine = PCV13 (Prevnar 13), PCV15 (Vaxneuvance), PCV20 (Prevnar 20)  * Pneumococcal polysaccharide vaccine =  PPSV23 (Pneumovax) Adults 19-63 yo with certain risk factors or if 65+ yo  If never received any pneumonia vaccine: recommend Prevnar 20 (PCV20)  Give PCV20 if previously received 1 dose of PCV13 or PPSV23   Hepatitis B Vaccine 3 dose series if at intermediate or high risk (ex: diabetes, end stage renal disease, liver disease)   Respiratory syncytial virus (RSV) Vaccine - COVERED BY MEDICARE PART D  * RSVPreF3 (Arexvy) CDC recommends that adults 60 years of age and older may receive a single dose of RSV vaccine using shared clinical decision-making (SCDM)   Tetanus (Td) Vaccine - COST NOT COVERED BY MEDICARE PART B Following completion of primary series, a booster dose should be given every 10 years to maintain immunity against tetanus. Td may also be given as tetanus wound prophylaxis.   Tdap Vaccine - COST NOT COVERED BY MEDICARE PART B Recommended at least once for all adults. For pregnant patients, recommended with each pregnancy.   Shingles Vaccine (Shingrix) - COST NOT COVERED BY MEDICARE PART B  2 shot series recommended in those 19 years and older who have or will have weakened immune systems or those 50 years and older     Health Maintenance Due:      Topic Date Due   • Colorectal Cancer Screening  12/03/2030   • Hepatitis C Screening  Completed     Immunizations Due:      Topic Date Due   • COVID-19 Vaccine (8 - 2024-25 season) 03/11/2025     Advance Directives   What are advance directives?  Advance directives are legal documents that state your wishes and plans for medical care. These plans are made ahead of time in case you lose your ability to make decisions for yourself. Advance directives can apply to any medical decision, such as the treatments you want, and if you want to donate organs.   What are the types of advance directives?  There are many types of advance directives, and each state has rules about how to use them. You may choose a combination of any of the following:  Living will:  This is a  written record of the treatment you want. You can also choose which treatments you do not want, which to limit, and which to stop at a certain time. This includes surgery, medicine, IV fluid, and tube feedings.   Durable power of  for healthcare (DPAHC):  This is a written record that states who you want to make healthcare choices for you when you are unable to make them for yourself. This person, called a proxy, is usually a family member or a friend. You may choose more than 1 proxy.  Do not resuscitate (DNR) order:  A DNR order is used in case your heart stops beating or you stop breathing. It is a request not to have certain forms of treatment, such as CPR. A DNR order may be included in other types of advance directives.  Medical directive:  This covers the care that you want if you are in a coma, near death, or unable to make decisions for yourself. You can list the treatments you want for each condition. Treatment may include pain medicine, surgery, blood transfusions, dialysis, IV or tube feedings, and a ventilator (breathing machine).  Values history:  This document has questions about your views, beliefs, and how you feel and think about life. This information can help others choose the care that you would choose.  Why are advance directives important?  An advance directive helps you control your care. Although spoken wishes may be used, it is better to have your wishes written down. Spoken wishes can be misunderstood, or not followed. Treatments may be given even if you do not want them. An advance directive may make it easier for your family to make difficult choices about your care.   Weight Management   Why it is important to manage your weight:  Being overweight increases your risk of health conditions such as heart disease, high blood pressure, type 2 diabetes, and certain types of cancer. It can also increase your risk for osteoarthritis, sleep apnea, and other respiratory problems. Aim for  a slow, steady weight loss. Even a small amount of weight loss can lower your risk of health problems.  How to lose weight safely:  A safe and healthy way to lose weight is to eat fewer calories and get regular exercise. You can lose up about 1 pound a week by decreasing the number of calories you eat by 500 calories each day.   Healthy meal plan for weight management:  A healthy meal plan includes a variety of foods, contains fewer calories, and helps you stay healthy. A healthy meal plan includes the following:  Eat whole-grain foods more often.  A healthy meal plan should contain fiber. Fiber is the part of grains, fruits, and vegetables that is not broken down by your body. Whole-grain foods are healthy and provide extra fiber in your diet. Some examples of whole-grain foods are whole-wheat breads and pastas, oatmeal, brown rice, and bulgur.  Eat a variety of vegetables every day.  Include dark, leafy greens such as spinach, kale, nani greens, and mustard greens. Eat yellow and orange vegetables such as carrots, sweet potatoes, and winter squash.   Eat a variety of fruits every day.  Choose fresh or canned fruit (canned in its own juice or light syrup) instead of juice. Fruit juice has very little or no fiber.  Eat low-fat dairy foods.  Drink fat-free (skim) milk or 1% milk. Eat fat-free yogurt and low-fat cottage cheese. Try low-fat cheeses such as mozzarella and other reduced-fat cheeses.  Choose meat and other protein foods that are low in fat.  Choose beans or other legumes such as split peas or lentils. Choose fish, skinless poultry (chicken or turkey), or lean cuts of red meat (beef or pork). Before you cook meat or poultry, cut off any visible fat.   Use less fat and oil.  Try baking foods instead of frying them. Add less fat, such as margarine, sour cream, regular salad dressing and mayonnaise to foods. Eat fewer high-fat foods. Some examples of high-fat foods include french fries, doughnuts, ice  cream, and cakes.  Eat fewer sweets.  Limit foods and drinks that are high in sugar. This includes candy, cookies, regular soda, and sweetened drinks.  Exercise:  Exercise at least 30 minutes per day on most days of the week. Some examples of exercise include walking, biking, dancing, and swimming. You can also fit in more physical activity by taking the stairs instead of the elevator or parking farther away from stores. Ask your healthcare provider about the best exercise plan for you.    © Copyright [employer] 2018 Information is for End User's use only and may not be sold, redistributed or otherwise used for commercial purposes. All illustrations and images included in CareNotes® are the copyrighted property of A.D.A.M., Inc. or [employer]

## 2025-05-23 NOTE — ASSESSMENT & PLAN NOTE
Stable on lisinopril continue that and follwoup in 6 months   Orders:    Comprehensive metabolic panel; Future    Lipid panel; Future

## 2025-05-23 NOTE — ASSESSMENT & PLAN NOTE
Reviewed last MRI and last visit from Dr Veronica Patient to see me in 6 months MRI is scheduled

## 2025-05-23 NOTE — ASSESSMENT & PLAN NOTE
BMI Counseling: Body mass index is 28.73 kg/m². The BMI is above normal. Nutrition recommendations include reducing portion sizes, decreasing overall calorie intake, 3-5 servings of fruits/vegetables daily, and moderation in carbohydrate intake. Exercise recommendations include exercising 3-5 times per week.    Orders:    Comprehensive metabolic panel; Future

## 2025-05-23 NOTE — ASSESSMENT & PLAN NOTE
Shots up to date Patient to see eye doctor and dentist Patient is up to date with PSA and will se urology Patint was given advnaced directive paperwork Patient diet and exercise were discussed today

## 2025-06-10 DIAGNOSIS — I10 ESSENTIAL HYPERTENSION: ICD-10-CM

## 2025-06-10 RX ORDER — LISINOPRIL 5 MG/1
5 TABLET ORAL DAILY
Qty: 90 TABLET | Refills: 1 | Status: SHIPPED | OUTPATIENT
Start: 2025-06-10 | End: 2025-06-17 | Stop reason: SDUPTHER

## 2025-06-16 ENCOUNTER — OFFICE VISIT (OUTPATIENT)
Dept: UROLOGY | Facility: MEDICAL CENTER | Age: 70
End: 2025-06-16
Payer: MEDICARE

## 2025-06-16 VITALS
WEIGHT: 183 LBS | HEART RATE: 51 BPM | BODY MASS INDEX: 28.62 KG/M2 | SYSTOLIC BLOOD PRESSURE: 138 MMHG | OXYGEN SATURATION: 99 % | DIASTOLIC BLOOD PRESSURE: 62 MMHG

## 2025-06-16 DIAGNOSIS — N40.1 BPH WITH URINARY OBSTRUCTION: Primary | ICD-10-CM

## 2025-06-16 DIAGNOSIS — N13.8 BPH WITH URINARY OBSTRUCTION: Primary | ICD-10-CM

## 2025-06-16 DIAGNOSIS — R97.20 ELEVATED PROSTATE SPECIFIC ANTIGEN (PSA): ICD-10-CM

## 2025-06-16 LAB
SL AMB  POCT GLUCOSE, UA: NORMAL
SL AMB LEUKOCYTE ESTERASE,UA: NORMAL
SL AMB POCT BILIRUBIN,UA: NORMAL
SL AMB POCT BLOOD,UA: NORMAL
SL AMB POCT CLARITY,UA: CLEAR
SL AMB POCT COLOR,UA: YELLOW
SL AMB POCT KETONES,UA: NORMAL
SL AMB POCT NITRITE,UA: NORMAL
SL AMB POCT PH,UA: 6.5
SL AMB POCT SPECIFIC GRAVITY,UA: 1.02
SL AMB POCT URINE PROTEIN: NORMAL
SL AMB POCT UROBILINOGEN: NORMAL

## 2025-06-16 PROCEDURE — 81003 URINALYSIS AUTO W/O SCOPE: CPT | Performed by: UROLOGY

## 2025-06-16 PROCEDURE — 99213 OFFICE O/P EST LOW 20 MIN: CPT | Performed by: UROLOGY

## 2025-06-16 NOTE — PROGRESS NOTES
"   HISTORY:    1.  Elevated PSA in the past.     See numbers below, now down to 4.5     MRI in February 2024 showed nothing suspicious.  94 mL prostate     2.  History of gross hematuria, cystoscopy showed large prostate     3.  BPH, on tamsulosin.  Symptoms overall greatly improved, quite pleased with it.  He does have some mild increased frequency, every 2-3 hours daytime, nocturia x 2.         ASSESSMENT / PLAN:    Doing well    PSA has dropped a lot    He should continue to get good help from the tamsulosin    He asks about procedures that might be necessary in the future, if the medicine stops helping.    Follow-up 1 year with PSA        Review of Systems      Objective:     Physical Exam  Genitourinary:     Comments: Penis testes normal    Prostate moderately large no nodules          0   Lab Value Date/Time    PSA 4.021 (H) 05/19/2025 0934    PSA 4.49 (H) 05/08/2024 1153    PSA 10.73 (H) 01/23/2024 0954    PSA 5.2 (H) 11/08/2023 1436    PSA 6.00 (H) 11/06/2023 1010    PSA 3.81 06/29/2022 0934    PSA 3.6 12/24/2020 0915   ]  BUN   Date Value Ref Range Status   05/19/2025 18 5 - 25 mg/dL Final   03/22/2023 10 7 - 28 mg/dL Final     Creatinine   Date Value Ref Range Status   05/19/2025 1.31 (H) 0.60 - 1.30 mg/dL Final     Comment:     Standardized to IDMS reference method   03/22/2023 1.12 0.53 - 1.30 mg/dL Final     No components found for: \"CBC\"    Problem List[1]     Patient ID: Yunier Pichardo is a 70 y.o. male.    Current Medications[2]                       [1]   Patient Active Problem List  Diagnosis    Essential hypertension    Overweight with body mass index (BMI) of 28 to 28.9 in adult    Elevated prostate specific antigen (PSA)    BPH with urinary obstruction    History of transient ischemic attack (TIA)    Mixed hyperlipidemia    Medicare annual wellness visit, subsequent    Tubular adenoma of colon    HAROON (obstructive sleep apnea)    Pancreatic cyst    Tendinitis, de Quervain's    Hearing difficulty " of both ears    Degeneration of intervertebral disc of lumbar region with discogenic back pain   [2]   Current Outpatient Medications:     aspirin (ECOTRIN LOW STRENGTH) 81 mg EC tablet, Take 81 mg by mouth in the morning., Disp: , Rfl:     atorvastatin (LIPITOR) 40 mg tablet, Take 1 tablet (40 mg total) by mouth daily, Disp: 90 tablet, Rfl: 1    lisinopril (ZESTRIL) 5 mg tablet, Take 1 tablet (5 mg total) by mouth daily, Disp: 90 tablet, Rfl: 1    multivitamin (THERAGRAN) TABS, Take 1 tablet by mouth in the morning., Disp: , Rfl:     tamsulosin (FLOMAX) 0.4 mg, Take 1 capsule (0.4 mg total) by mouth daily with dinner, Disp: 90 capsule, Rfl: 1

## 2025-06-17 ENCOUNTER — TELEPHONE (OUTPATIENT)
Age: 70
End: 2025-06-17

## 2025-06-17 DIAGNOSIS — I10 ESSENTIAL HYPERTENSION: ICD-10-CM

## 2025-06-17 DIAGNOSIS — N13.8 BPH WITH URINARY OBSTRUCTION: ICD-10-CM

## 2025-06-17 DIAGNOSIS — N40.1 BPH WITH URINARY OBSTRUCTION: ICD-10-CM

## 2025-06-17 RX ORDER — LISINOPRIL 5 MG/1
5 TABLET ORAL DAILY
Qty: 10 TABLET | Refills: 0 | Status: SHIPPED | OUTPATIENT
Start: 2025-06-17 | End: 2025-06-27

## 2025-06-17 RX ORDER — ATORVASTATIN CALCIUM 40 MG/1
40 TABLET, FILM COATED ORAL DAILY
Qty: 10 TABLET | Refills: 0 | Status: SHIPPED | OUTPATIENT
Start: 2025-06-17 | End: 2025-06-27

## 2025-06-17 RX ORDER — TAMSULOSIN HYDROCHLORIDE 0.4 MG/1
0.4 CAPSULE ORAL
Qty: 10 CAPSULE | Refills: 0 | Status: SHIPPED | OUTPATIENT
Start: 2025-06-17 | End: 2025-06-27

## 2025-06-17 NOTE — TELEPHONE ENCOUNTER
Called and spoke with pharmacist from [facility] who advised that medications can be called in to them as long as they are not controlled substances. Their phone number is [phone]

## 2025-06-17 NOTE — TELEPHONE ENCOUNTER
Patient called and stated he had jsut left for vacation and has forgotten all of his medications. Patient states he takes atorvastatin, tamsulosin and lisinopril. Patient is asking if he can go 10 days without taking the medication. Patient stated if he cannot go without the medication he is asking if refills can be provided by his pcp. Please advise. If refills need to be called in please send to [facility] on [facility]. Once pcp provides advise/recommendations please return patient call to advise.

## 2025-06-18 ENCOUNTER — PATIENT MESSAGE (OUTPATIENT)
Dept: FAMILY MEDICINE CLINIC | Facility: CLINIC | Age: 70
End: 2025-06-18

## 2025-06-18 NOTE — PATIENT COMMUNICATION
Patient called regarding status of request for 10 day supply of medications Atorvastatin, Tamsulosin, and Lisinopril to be sent to pharmacy in [place]. I advised 10 day supply of medications were sent to pharmacy in [place]. Patient expressed thanks and will call pharmacy to see when he can  medications.

## 2025-06-22 PROBLEM — Z00.00 MEDICARE ANNUAL WELLNESS VISIT, SUBSEQUENT: Status: RESOLVED | Noted: 2023-03-29 | Resolved: 2025-06-22

## 2025-07-27 DIAGNOSIS — N40.1 BPH WITH URINARY OBSTRUCTION: ICD-10-CM

## 2025-07-27 DIAGNOSIS — N13.8 BPH WITH URINARY OBSTRUCTION: ICD-10-CM

## 2025-07-28 ENCOUNTER — TELEPHONE (OUTPATIENT)
Age: 70
End: 2025-07-28

## 2025-07-28 DIAGNOSIS — N40.1 BPH WITH URINARY OBSTRUCTION: ICD-10-CM

## 2025-07-28 DIAGNOSIS — N13.8 BPH WITH URINARY OBSTRUCTION: ICD-10-CM

## 2025-07-28 RX ORDER — TAMSULOSIN HYDROCHLORIDE 0.4 MG/1
0.4 CAPSULE ORAL
Qty: 90 CAPSULE | Refills: 1 | Status: SHIPPED | OUTPATIENT
Start: 2025-07-28 | End: 2026-01-24

## 2025-07-28 RX ORDER — TAMSULOSIN HYDROCHLORIDE 0.4 MG/1
0.4 CAPSULE ORAL
Qty: 90 CAPSULE | Refills: 1 | Status: SHIPPED | OUTPATIENT
Start: 2025-07-28 | End: 2025-07-28

## 2025-08-21 DIAGNOSIS — I10 ESSENTIAL HYPERTENSION: ICD-10-CM

## 2025-08-21 RX ORDER — ATORVASTATIN CALCIUM 40 MG/1
40 TABLET, FILM COATED ORAL DAILY
Qty: 90 TABLET | Refills: 1 | Status: SHIPPED | OUTPATIENT
Start: 2025-08-21 | End: 2025-08-31